# Patient Record
Sex: FEMALE | Race: WHITE | Employment: OTHER | URBAN - METROPOLITAN AREA
[De-identification: names, ages, dates, MRNs, and addresses within clinical notes are randomized per-mention and may not be internally consistent; named-entity substitution may affect disease eponyms.]

---

## 2017-03-17 RX ORDER — HYDROCHLOROTHIAZIDE 12.5 MG/1
CAPSULE ORAL
Qty: 90 CAP | Refills: 0 | Status: SHIPPED | OUTPATIENT
Start: 2017-03-17 | End: 2017-05-15 | Stop reason: SDUPTHER

## 2017-03-20 RX ORDER — ESTRADIOL 0.1 MG/D
PATCH TRANSDERMAL
Qty: 12 PATCH | Refills: 3 | Status: SHIPPED | OUTPATIENT
Start: 2017-03-20 | End: 2017-12-13 | Stop reason: SDUPTHER

## 2017-03-20 RX ORDER — LISINOPRIL 10 MG/1
TABLET ORAL
Qty: 90 TAB | Refills: 3 | Status: SHIPPED | OUTPATIENT
Start: 2017-03-20 | End: 2017-05-15 | Stop reason: SDUPTHER

## 2017-03-27 ENCOUNTER — OFFICE VISIT (OUTPATIENT)
Dept: INTERNAL MEDICINE CLINIC | Age: 77
End: 2017-03-27

## 2017-03-27 VITALS
HEIGHT: 62 IN | RESPIRATION RATE: 15 BRPM | HEART RATE: 54 BPM | TEMPERATURE: 98.2 F | BODY MASS INDEX: 22.23 KG/M2 | OXYGEN SATURATION: 98 % | DIASTOLIC BLOOD PRESSURE: 72 MMHG | SYSTOLIC BLOOD PRESSURE: 148 MMHG | WEIGHT: 120.8 LBS

## 2017-03-27 DIAGNOSIS — I10 ESSENTIAL HYPERTENSION: ICD-10-CM

## 2017-03-27 DIAGNOSIS — Z01.818 PREOP EXAM FOR INTERNAL MEDICINE: Primary | ICD-10-CM

## 2017-03-27 DIAGNOSIS — H25.11 NUCLEAR SCLEROTIC CATARACT OF RIGHT EYE: ICD-10-CM

## 2017-03-27 NOTE — MR AVS SNAPSHOT
Visit Information Date & Time Provider Department Dept. Phone Encounter #  
 3/27/2017  1:00 PM Ariane Robles MD Internist of [facility] [address] [id number] Your Appointments 5/8/2017  9:35 AM  
LAB with [facility] NURSE VISIT Internist of [facility] ([address]) Appt Note: labs 6mos. rm  
 [address], [address] [place] [address] [address]  
  
   
 [address], [facility] 5/15/2017 10:00 AM  
Office Visit with Ariane Robles MD  
Internist of [address] [facility]) Appt Note: ov 6mos. rm  
 [address], [address] [place] [address] [address]  
  
   
 [address], [facility] Upcoming Health Maintenance Date Due DTaP/Tdap/Td series (1 - Tdap) 7/15/1962 MEDICARE YEARLY EXAM 3/24/2017 Pneumococcal 65+ Low/Medium Risk (2 of 2 - PPSV23) 5/23/2017 GLAUCOMA SCREENING Q2Y 7/13/2017 COLONOSCOPY 11/13/2025 Allergies as of 3/27/2017  Review Complete On: 3/27/2017 By: Ariane Robles MD  
 No Known Allergies Current Immunizations  Reviewed on 11/14/2016 Name Date H1N1 FLU VACCINE 12/17/2009 Influenza High Dose Vaccine PF 11/14/2016, 11/11/2015 Influenza Vaccine 10/20/2014  2:55 PM, 10/4/2013 12:06 PM  
 Influenza Vaccine Split 10/23/2012 11:50 AM, 10/5/2011 Influenza Vaccine Whole 10/15/2010 Pneumococcal Vaccine (Unspecified Type) 5/23/2012 Not reviewed this visit You Were Diagnosed With   
  
 Codes Comments Preop exam for internal medicine    -  Primary ICD-10-CM: S05.178 ICD-9-CM: V72.83 Nuclear sclerotic cataract of right eye     ICD-10-CM: H25.11 ICD-9-CM: 366.16 Essential hypertension     ICD-10-CM: I10 
ICD-9-CM: 401.9 Vitals BP Pulse Temp Resp Height(growth percentile) Weight(growth percentile)  148/72 (BP 1 Location: Right arm, BP Patient Position: Sitting) (!) 54 98.2 °F (36.8 °C) (Oral) 15 5' 1.5\" (1.562 m) 120 lb 12.8 oz (54.8 kg) SpO2 BMI OB Status Smoking Status 98% 22.46 kg/m2 Hysterectomy Never Smoker Vitals History BMI and BSA Data Body Mass Index Body Surface Area  
 22.46 kg/m 2 1.54 m 2 Preferred Pharmacy Pharmacy Name Phone 305 [facility], [address] [place] [address] [address] Your Updated Medication List  
  
   
This list is accurate as of: 3/27/17  1:45 PM.  Always use your most recent med list.  
  
  
  
  
 CALCIUM WITH VITAMIN D PO Take  by mouth. DORZOLAMIDE-TIMOLOL OP Apply  to eye.  
  
 estradiol 0.1 mg/24 hr  
Commonly known as:  CLIMARA Apply 1 patch every 7 days  
  
 hydroCHLOROthiazide 12.5 mg capsule Commonly known as:  Linden Tiff Take 1 capsule by mouth  daily  
  
 lisinopril 10 mg tablet Commonly known as:  Zamudio Joshua Take 1 tablet by mouth  daily LUMIGAN 0.03 % ophthalmic drops Generic drug:  bimatoprost  
Administer 1 Drop to both eyes every evening. MULTIVITAMIN PO Take  by mouth. Introducing [facility] & HEALTH SERVICES! Steve Rossi introduces LoraxAg patient portal. Now you can access parts of your medical record, email your doctor's office, and request medication refills online. 1. In your internet browser, go to [email or web address] [email or web address]/[email or web address] Systems 2. Click on the First Time User? Click Here link in the Sign In box. You will see the New Member Sign Up page. 3. Enter your LoraxAg Access Code exactly as it appears below. You will not need to use this code after youve completed the sign-up process. If you do not sign up before the expiration date, you must request a new code. · LoraxAg Access Code: [email or web address] Expires: 6/25/2017  1:07 PM 
 
4. Enter the last four digits of your Social Security Number (xxxx) and Date of Birth (mm/dd/yyyy) as indicated and click Submit.  You will be taken to the next sign-up page. 5. Create a Casero ID. This will be your Casero login ID and cannot be changed, so think of one that is secure and easy to remember. 6. Create a Casero password. You can change your password at any time. 7. Enter your Password Reset Question and Answer. This can be used at a later time if you forget your password. 8. Enter your e-mail address. You will receive e-mail notification when new information is available in [address]. 9. Click Sign Up. You can now view and download portions of your medical record. 10. Click the Download Summary menu link to download a portable copy of your medical information. If you have questions, please visit the Frequently Asked Questions section of the Casero website. Remember, Casero is NOT to be used for urgent needs. For medical emergencies, dial 911. Now available from your iPhone and Android! Please provide this summary of care documentation to your next provider. Your primary care clinician is listed as Rosemary Britton. If you have any questions after today's visit, please call [phone].

## 2017-03-27 NOTE — PROGRESS NOTES
Hilary Ernst 1940, is a 77 y.o. female, who is seen today for preoperative evaluation prior to having cataract surgery. The patient is felt generally well but has been a little congested in her nose with some pressure in the frontal and maxillary areas from time to time and it is worse today than it has been the last few days. She has not tried any medicine. She says this type of thing has gotten worse each spring the last few years. She takes all of her medicine correctly and otherwise feels well. Past Medical History:   Diagnosis Date    Diabetes ([facility] [address]     Diverticulosis of sigmoid colon 11/13/15    mild; Dr. Noah Romero Eczema     Elevated cholesterol     Family history of diabetes mellitus (DM)     Glaucoma     Hyperlipidemia     Hypertension     Indigestion     Interpolated PVCs      Past Surgical History:   Procedure Laterality Date    HX BREAST BIOPSY      HX HYSTERECTOMY  1981    STEPHAN/BSO    HX OOPHORECTOMY      right     Current Outpatient Prescriptions   Medication Sig Dispense Refill    estradiol (CLIMARA) 0.1 mg/24 hr Apply 1 patch every 7 days 12 Patch 3    lisinopril (PRINIVIL, ZESTRIL) 10 mg tablet Take 1 tablet by mouth  daily 90 Tab 3    hydroCHLOROthiazide (MICROZIDE) 12.5 mg capsule Take 1 capsule by mouth  daily 90 Cap 0    bimatoprost (LUMIGAN) 0.03 % ophthalmic drops Administer 1 Drop to both eyes every evening.  DORZOLAMIDE HCL/TIMOLOL MALEAT (DORZOLAMIDE-TIMOLOL OP) Apply  to eye.  MULTIVITAMIN PO Take  by mouth.  CALCIUM CARBONATE/VITAMIN D3 (CALCIUM WITH VITAMIN D PO) Take  by mouth.        No Known Allergies     Social History     Social History    Marital status:      Spouse name: N/A    Number of children: N/A    Years of education: N/A     Social History Main Topics    Smoking status: Never Smoker    Smokeless tobacco: Never Used    Alcohol use No      Comment: with dinner    Drug use: No    Sexual activity: Not Currently Other Topics Concern    None     Social History Narrative     Review of systems: She denies dyspnea on exertion PND orthopnea or edema. She denies any type of exertional discomfort in the chest neck jaw back or arm. Visit Vitals    /72 (BP 1 Location: Right arm, BP Patient Position: Sitting)    Pulse (!) 54    Temp 98.2 °F (36.8 °C) (Oral)    Resp 15    Ht 5' 1.5\" (1.562 m)    Wt 120 lb 12.8 oz (54.8 kg)    SpO2 98%    BMI 22.46 kg/m2     Nasal passages reveal minimal tenacious clear secretions. Pharynx reveals no redness exudate or drainage. Neck reveals no adenopathy or thyromegaly. Carotids are 2+ without bruits. Lungs are clear to percussion. Good breath sounds with no wheezing or crackles. Heart reveals a regular rhythm with normal S1 and S2 with no murmur gallop click or rub. Apical impulse is not palpable. Abdomen is soft and nontender with no hepatosplenomegaly or masses and no bruits. Extremities reveal no clubbing cyanosis or edema. Pulses are 2+ throughout. Assessment: #1. Right cataract, low risk for upcoming surgery. She is medically cleared for cataract surgery and associated anesthetic. #2.  Apparent allergic rhinitis with some nasal discomfort. She will try Sudafed and use Tylenol as needed. #3. Hypertension fairly well controlled. She will continue hydrochlorothiazide 12.5 mg daily and lisinopril 10 mg daily. Follow-up as previously planned about 2 months from now. He Iraheta MD FACP    Please note: This document has been produced using voice recognition software. Unrecognized errors in transcription may be present.

## 2017-05-08 ENCOUNTER — HOSPITAL ENCOUNTER (OUTPATIENT)
Dept: LAB | Age: 77
Discharge: HOME | End: 2017-05-08
Payer: MEDICARE

## 2017-05-08 DIAGNOSIS — R73.01 IMPAIRED FASTING GLUCOSE: ICD-10-CM

## 2017-05-08 DIAGNOSIS — R73.03 PREDIABETES: ICD-10-CM

## 2017-05-08 DIAGNOSIS — E78.5 HYPERLIPIDEMIA LDL GOAL <130: ICD-10-CM

## 2017-05-08 LAB
ALBUMIN SERPL BCP-MCNC: 3.7 G/DL (ref 3.4–5)
ALBUMIN/GLOB SERPL: 1.2 {RATIO} (ref 0.8–1.7)
ALP SERPL-CCNC: 49 U/L (ref 45–117)
ALT SERPL-CCNC: 18 U/L (ref 13–56)
ANION GAP BLD CALC-SCNC: 8 MMOL/L (ref 3–18)
AST SERPL W P-5'-P-CCNC: 18 U/L (ref 15–37)
BILIRUB SERPL-MCNC: 0.4 MG/DL (ref 0.2–1)
BUN SERPL-MCNC: 10 MG/DL (ref 7–18)
BUN/CREAT SERPL: 15 (ref 12–20)
CALCIUM SERPL-MCNC: 8.6 MG/DL (ref 8.5–10.1)
CHLORIDE SERPL-SCNC: 101 MMOL/L (ref 100–108)
CHOLEST SERPL-MCNC: 246 MG/DL
CO2 SERPL-SCNC: 30 MMOL/L (ref 21–32)
CREAT SERPL-MCNC: 0.66 MG/DL (ref 0.6–1.3)
EST. AVERAGE GLUCOSE BLD GHB EST-MCNC: 137 MG/DL
GLOBULIN SER CALC-MCNC: 3 G/DL (ref 2–4)
GLUCOSE SERPL-MCNC: 107 MG/DL (ref 74–99)
HBA1C MFR BLD: 6.4 % (ref 4.2–5.6)
HDLC SERPL-MCNC: 61 MG/DL (ref 40–60)
HDLC SERPL: 4 {RATIO} (ref 0–5)
LDLC SERPL CALC-MCNC: 163.8 MG/DL (ref 0–100)
LIPID PROFILE,FLP: ABNORMAL
POTASSIUM SERPL-SCNC: 4.1 MMOL/L (ref 3.5–5.5)
PROT SERPL-MCNC: 6.7 G/DL (ref 6.4–8.2)
SODIUM SERPL-SCNC: 139 MMOL/L (ref 136–145)
TRIGL SERPL-MCNC: 106 MG/DL (ref ?–150)
VLDLC SERPL CALC-MCNC: 21.2 MG/DL

## 2017-05-08 PROCEDURE — 36415 COLL VENOUS BLD VENIPUNCTURE: CPT | Performed by: INTERNAL MEDICINE

## 2017-05-08 PROCEDURE — 80061 LIPID PANEL: CPT | Performed by: INTERNAL MEDICINE

## 2017-05-08 PROCEDURE — 80053 COMPREHEN METABOLIC PANEL: CPT | Performed by: INTERNAL MEDICINE

## 2017-05-08 PROCEDURE — 83036 HEMOGLOBIN GLYCOSYLATED A1C: CPT | Performed by: INTERNAL MEDICINE

## 2017-05-15 ENCOUNTER — OFFICE VISIT (OUTPATIENT)
Dept: INTERNAL MEDICINE CLINIC | Age: 77
End: 2017-05-15

## 2017-05-15 VITALS
SYSTOLIC BLOOD PRESSURE: 136 MMHG | HEART RATE: 53 BPM | RESPIRATION RATE: 12 BRPM | TEMPERATURE: 98.5 F | WEIGHT: 118 LBS | OXYGEN SATURATION: 98 % | BODY MASS INDEX: 21.71 KG/M2 | HEIGHT: 62 IN | DIASTOLIC BLOOD PRESSURE: 76 MMHG

## 2017-05-15 DIAGNOSIS — I10 ESSENTIAL HYPERTENSION: Primary | ICD-10-CM

## 2017-05-15 DIAGNOSIS — R73.03 PREDIABETES: ICD-10-CM

## 2017-05-15 DIAGNOSIS — R73.01 IMPAIRED FASTING GLUCOSE: ICD-10-CM

## 2017-05-15 DIAGNOSIS — E78.5 HYPERLIPIDEMIA LDL GOAL <130: ICD-10-CM

## 2017-05-15 DIAGNOSIS — I73.00 RAYNAUD'S DISEASE WITHOUT GANGRENE: ICD-10-CM

## 2017-05-15 RX ORDER — LISINOPRIL 10 MG/1
TABLET ORAL
Qty: 90 TAB | Refills: 3 | Status: SHIPPED | OUTPATIENT
Start: 2017-05-15 | End: 2017-11-14 | Stop reason: DRUGHIGH

## 2017-05-15 RX ORDER — HYDROCHLOROTHIAZIDE 12.5 MG/1
CAPSULE ORAL
Qty: 90 CAP | Refills: 3 | Status: SHIPPED | OUTPATIENT
Start: 2017-05-15 | End: 2017-11-14 | Stop reason: DRUGHIGH

## 2017-05-15 NOTE — PROGRESS NOTES
Health Maintenance Due   Topic Date Due    DTaP/Tdap/Td series (1 - Tdap) 07/15/1962    MEDICARE YEARLY EXAM  03/24/2017    GLAUCOMA SCREENING Q2Y  07/13/2017     1. Have you been to the ER, urgent care clinic since your last visit? Hospitalized since your last visit? No    2. Have you seen or consulted any other health care providers outside of the [address] [place] [address] since your last visit? Include any pap smears or colon screening.  No

## 2017-05-15 NOTE — MR AVS SNAPSHOT
Visit Information Date & Time Provider Department Dept. Phone Encounter #  
 5/15/2017 10:00 AM Michael Brennan MD Internist of [facility] [place] [address] [id number] Your Appointments 11/14/2017 10:30 AM  
PHYSICAL with Michael Brennan MD  
Internist of [facility]) Appt Note: rpe  
 [address] [facility], [address] [address] [address]  
  
   
 [address], [address] Upcoming Health Maintenance Date Due DTaP/Tdap/Td series (1 - Tdap) 7/15/1961 MEDICARE YEARLY EXAM 3/24/2017 GLAUCOMA SCREENING Q2Y 7/13/2017 Pneumococcal 65+ Low/Medium Risk (2 of 2 - PPSV23) 5/23/2017 INFLUENZA AGE 9 TO ADULT 8/1/2017 COLONOSCOPY 11/13/2025 Allergies as of 5/15/2017  Review Complete On: 5/15/2017 By: Michael Brennan MD  
 No Known Allergies Current Immunizations  Reviewed on 11/14/2016 Name Date H1N1 FLU VACCINE 12/17/2009 Influenza High Dose Vaccine PF 11/14/2016, 11/11/2015 Influenza Vaccine 10/20/2014  2:55 PM, 10/4/2013 12:06 PM  
 Influenza Vaccine Split 10/23/2012 11:50 AM, 10/5/2011 Influenza Vaccine Whole 10/15/2010 Pneumococcal Vaccine (Unspecified Type) 5/23/2012 Not reviewed this visit You Were Diagnosed With   
  
 Codes Comments Essential hypertension    -  Primary ICD-10-CM: I10 
ICD-9-CM: 401.9 Hyperlipidemia LDL goal <130     ICD-10-CM: E78.5 ICD-9-CM: 272.4 Prediabetes     ICD-10-CM: R73.03 
ICD-9-CM: 790.29 Vitals BP Pulse Temp Resp Height(growth percentile) Weight(growth percentile) 136/76 (BP 1 Location: Left arm, BP Patient Position: Sitting) (!) 53 98.5 °F (36.9 °C) (Oral) 12 5' 1.5\" (1.562 m) 118 lb (53.5 kg) SpO2 BMI OB Status Smoking Status 98% 21.93 kg/m2 Hysterectomy Never Smoker Vitals History BMI and BSA Data  Body Mass Index Body Surface Area  
 21.93 kg/m 2 1.52 m 2  
  
  
 Preferred Pharmacy Pharmacy Name Phone 305 [facility], [address] [facility] [address] Sa[address] M[address] Your Updated Medication List  
  
   
This list is accurate as of: 5/15/17 10:30 AM.  Always use your most recent med list.  
  
  
  
  
 CALCIUM WITH VITAMIN D PO Take  by mouth. DORZOLAMIDE-TIMOLOL OP Apply  to eye.  
  
 estradiol 0.1 mg/24 hr  
Commonly known as:  CLIMARA Apply 1 patch every 7 days  
  
 hydroCHLOROthiazide 12.5 mg capsule Commonly known as:  Ada Nian Take 1 capsule by mouth  daily  
  
 lisinopril 10 mg tablet Commonly known as:  Marialuisa Shawl Take 1 tablet by mouth  daily LUMIGAN 0.03 % ophthalmic drops Generic drug:  bimatoprost  
Administer 1 Drop to both eyes every evening. MULTIVITAMIN PO Take  by mouth. Prescriptions Sent to Pharmacy Refills  
 hydroCHLOROthiazide (MICROZIDE) 12.5 mg capsule 3 Sig: Take 1 capsule by mouth  daily Class: Normal  
 Pharmacy: [facility] [address] Ph #: [phone]  
 lisinopril (PRINIVIL, ZESTRIL) 10 mg tablet 3 Sig: Take 1 tablet by mouth  daily Class: Normal  
 Pharmacy: [facility]  [address] Ph #: [phone] Patient Instructions Health Maintenance Due Topic Date Due  
 DTaP/Tdap/Td series (1 - Tdap) 07/15/1962  MEDICARE YEARLY EXAM  03/24/2017  GLAUCOMA SCREENING Q2Y  07/13/2017 Introducing [facility] & HEALTH SERVICES! Bobby Pickard introduces citiservi patient portal. Now you can access parts of your medical record, email your doctor's office, and request medication refills online. 1. In your internet browser, go to [email or web address] 2. Click on the First Time User? Click Here link in the Sign In box. You will see the New Member Sign Up page. 3. Enter your citiservi Access Code exactly as it appears below.  You will not need to use this code after youve completed the sign-up process. If you do not sign up before the expiration date, you must request a new code. · Datahero Access Code: [email or web address] Expires: 6/25/2017  1:07 PM 
 
4. Enter the last four digits of your Social Security Number (xxxx) and Date of Birth (mm/dd/yyyy) as indicated and click Submit. You will be taken to the next sign-up page. 5. Create a Datahero ID. This will be your Datahero login ID and cannot be changed, so think of one that is secure and easy to remember. 6. Create a Datahero password. You can change your password at any time. 7. Enter your Password Reset Question and Answer. This can be used at a later time if you forget your password. 8. Enter your e-mail address. You will receive e-mail notification when new information is available in [address] 9. Click Sign Up. You can now view and download portions of your medical record. 10. Click the Download Summary menu link to download a portable copy of your medical information. If you have questions, please visit the Frequently Asked Questions section of the Datahero website. Remember, Datahero is NOT to be used for urgent needs. For medical emergencies, dial 911. Now available from your iPhone and Android! Please provide this summary of care documentation to your next provider. Your primary care clinician is listed as Bharat Hwang. If you have any questions after today's visit, please call [phone].

## 2017-05-15 NOTE — PATIENT INSTRUCTIONS
Health Maintenance Due   Topic Date Due    DTaP/Tdap/Td series (1 - Tdap) 07/15/1962    MEDICARE YEARLY EXAM  03/24/2017    GLAUCOMA SCREENING Q2Y  07/13/2017

## 2017-05-15 NOTE — PROGRESS NOTES
Deuce Moreira 1940, is a 77 y.o. female, who is seen today for reevaluation of hypertension hyperlipidemia impaired fasting glucose and periodic sciatica. She still has lumbar pain particularly the right and somewhat on the left but only occasionally radiating into her right leg. She uses Tylenol has not used any hydrocodone for quite a while. She takes her blood pressure medicine regularly and is eating a very healthy diet with no sweets and less starch than she used to eat. She also notes that sometimes in the cold weather this last winter her fingertips would get weight and tingly and hurt but by warming them symptoms would clear. Past Medical History:   Diagnosis Date    [facility]     Diverticulosis of sigmoid colon 11/13/15    mild; Dr. Mckay Human Eczema     Elevated cholesterol     Family history of diabetes mellitus (DM)     Glaucoma     Hyperlipidemia     Hypertension     Indigestion     Interpolated PVCs      Current Outpatient Prescriptions   Medication Sig Dispense Refill    hydroCHLOROthiazide (MICROZIDE) 12.5 mg capsule Take 1 capsule by mouth  daily 90 Cap 3    lisinopril (PRINIVIL, ZESTRIL) 10 mg tablet Take 1 tablet by mouth  daily 90 Tab 3    estradiol (CLIMARA) 0.1 mg/24 hr Apply 1 patch every 7 days 12 Patch 3    bimatoprost (LUMIGAN) 0.03 % ophthalmic drops Administer 1 Drop to both eyes every evening.  DORZOLAMIDE HCL/TIMOLOL MALEAT (DORZOLAMIDE-TIMOLOL OP) Apply  to eye.  MULTIVITAMIN PO Take  by mouth.  CALCIUM CARBONATE/VITAMIN D3 (CALCIUM WITH VITAMIN D PO) Take  by mouth. Visit Vitals    /76 (BP 1 Location: Left arm, BP Patient Position: Sitting)    Pulse (!) 53    Temp 98.5 °F (36.9 °C) (Oral)    Resp 12    Ht 5' 1.5\" (1.562 m)    Wt 118 lb (53.5 kg)    SpO2 98%    BMI 21.93 kg/m2     Carotids are 2+ without bruits. Lungs are clear to percussion. Good breath sounds with no wheezing or crackles.   Heart reveals a regular rhythm with normal S1 and S2 no murmur gallop click or rub. Apical impulse is nonpalpable. Abdomen is soft and nontender with no hepatosplenomegaly or masses and no bruits. Extremities reveal no clubbing cyanosis or edema. Pulses are 2+ throughout. Assessment: #1. Hypertension is controlled. She will continue lisinopril 10 mg daily and hydrochlorothiazide 12.5 mg daily. #2. Hyperlipidemia intolerant to statin. She will continue low-fat diet and avoid weight gain. #3.  Impaired fasting glucose doing well. She will continue current very healthy diet with avoidance of sweets and excess starches. #4.  Periodic sciatica doing quite well. She will call if symptoms worsen dramatically. #5.  Symptoms of Raynaud's. Follow-up in 6 months for complete evaluation    He Thomas MD FACP    Please note: This document has been produced using voice recognition software. Unrecognized errors in transcription may be present.

## 2017-07-20 ENCOUNTER — OFFICE VISIT (OUTPATIENT)
Dept: INTERNAL MEDICINE CLINIC | Age: 77
End: 2017-07-20

## 2017-07-20 VITALS
HEIGHT: 62 IN | DIASTOLIC BLOOD PRESSURE: 80 MMHG | OXYGEN SATURATION: 99 % | HEART RATE: 56 BPM | RESPIRATION RATE: 12 BRPM | TEMPERATURE: 97.9 F | SYSTOLIC BLOOD PRESSURE: 154 MMHG | WEIGHT: 120 LBS | BODY MASS INDEX: 22.08 KG/M2

## 2017-07-20 DIAGNOSIS — R10.9 FLANK PAIN: Primary | ICD-10-CM

## 2017-07-20 DIAGNOSIS — M54.32 SCIATICA OF LEFT SIDE: ICD-10-CM

## 2017-07-20 RX ORDER — DEXAMETHASONE 4 MG/1
TABLET ORAL
Qty: 20 TAB | Refills: 0 | Status: SHIPPED | OUTPATIENT
Start: 2017-07-20 | End: 2017-07-27 | Stop reason: SDUPTHER

## 2017-07-20 RX ORDER — HYDROCODONE BITARTRATE AND ACETAMINOPHEN 7.5; 325 MG/1; MG/1
1 TABLET ORAL
Qty: 60 TAB | Refills: 0 | Status: SHIPPED | OUTPATIENT
Start: 2017-07-20 | End: 2017-11-14 | Stop reason: ALTCHOICE

## 2017-07-20 NOTE — MR AVS SNAPSHOT
Visit Information Date & Time Provider Department Dept. Phone Encounter #  
 7/20/2017 11:00 AM Kina Marcum MD Internist of [facility] [place] [address] [id number] Your Appointments 11/10/2017  8:45 AM  
LAB with [facility] NURSE VISIT Internist of [facility] Appt Note: lab  
 [address], [address] [facility] [address] [place]  
  
   
 [address], [address]  
  
    
 11/14/2017 10:30 AM  
PHYSICAL with Kina Marcum MD  
Internist of [facility]) Appt Note: rpe  
 [address] [facility], [address] [address] [address]  
  
   
 [address], [address] Upcoming Health Maintenance Date Due DTaP/Tdap/Td series (1 - Tdap) 7/15/1961 MEDICARE YEARLY EXAM 3/24/2017 Pneumococcal 65+ Low/Medium Risk (2 of 2 - PPSV23) 5/23/2017 GLAUCOMA SCREENING Q2Y 7/13/2017 INFLUENZA AGE 9 TO ADULT 8/1/2017 COLONOSCOPY 11/13/2025 Allergies as of 7/20/2017  Review Complete On: 7/20/2017 By: Kina Marcum MD  
 No Known Allergies Current Immunizations  Reviewed on 11/14/2016 Name Date H1N1 FLU VACCINE 12/17/2009 Influenza High Dose Vaccine PF 11/14/2016, 11/11/2015 Influenza Vaccine 10/20/2014  2:55 PM, 10/4/2013 12:06 PM  
 Influenza Vaccine Split 10/23/2012 11:50 AM, 10/5/2011 Influenza Vaccine Whole 10/15/2010 Pneumococcal Vaccine (Unspecified Type) 5/23/2012 Not reviewed this visit You Were Diagnosed With   
  
 Codes Comments Flank pain    -  Primary ICD-10-CM: R10.9 ICD-9-CM: 789.09 Sciatica of left side     ICD-10-CM: M54.32 
ICD-9-CM: 724.3 Vitals BP Pulse Temp Resp Height(growth percentile) Weight(growth percentile) 160/70 (!) 56 97.9 °F (36.6 °C) (Oral) 12 5' 1.5\" (1.562 m) 120 lb (54.4 kg) SpO2 BMI OB Status Smoking Status 99% 22.31 kg/m2 Hysterectomy Never Smoker Vitals History BMI and BSA Data Body Mass Index Body Surface Area  
 22.31 kg/m 2 1.54 m 2 Preferred Pharmacy Pharmacy Name Phone 305 [facility], [address] Sa[address] Your Updated Medication List  
  
   
This list is accurate as of: 17 11:19 AM.  Always use your most recent med list.  
  
  
  
  
 CALCIUM WITH VITAMIN D PO Take  by mouth. dexamethasone 4 mg tablet Commonly known as:  DECADRON  
1 tablet by mouth twice a day DORZOLAMIDE-TIMOLOL OP Apply  to eye.  
  
 estradiol 0.1 mg/24 hr  
Commonly known as:  CLIMARA Apply 1 patch every 7 days  
  
 hydroCHLOROthiazide 12.5 mg capsule Commonly known as:  Jennifer Elly Take 1 capsule by mouth  daily HYDROcodone-acetaminophen 7.5-325 mg per tablet Commonly known as:  [place]is Points Take 1 Tab by mouth every six (6) hours as needed for Pain. Max Daily Amount: 4 Tabs. lisinopril 10 mg tablet Commonly known as:  Dorathy Massed Take 1 tablet by mouth  daily LUMIGAN 0.03 % ophthalmic drops Generic drug:  bimatoprost  
Administer 1 Drop to both eyes every evening. MULTIVITAMIN PO Take  by mouth. Prescriptions Printed Refills  
 dexamethasone (DECADRON) 4 mg tablet 0 Si tablet by mouth twice a day Class: Print HYDROcodone-acetaminophen (NORCO) 7.5-325 mg per tablet 0 Sig: Take 1 Tab by mouth every six (6) hours as needed for Pain. Max Daily Amount: 4 Tabs. Class: Print Route: Oral  
  
We Performed the Following AMB POC URINALYSIS DIP STICK AUTO W/O MICRO [83330 CPT(R)] Introducing [facility] & [facility] SERVICES! Ember Tucker introduces Tapstream patient portal. Now you can access parts of your medical record, email your doctor's office, and request medication refills online. 1. In your internet browser, go to [email or web address] 2. Click on the First Time User? Click Here link in the Sign In box. You will see the New Member Sign Up page. 3. Enter your UtiliData Access Code exactly as it appears below. You will not need to use this code after youve completed the sign-up process. If you do not sign up before the expiration date, you must request a new code. · UtiliData Access Code: [email or web address] Expires: 10/18/2017 11:19 AM 
 
4. Enter the last four digits of your Social Security Number (xxxx) and Date of Birth (mm/dd/yyyy) as indicated and click Submit. You will be taken to the next sign-up page. 5. Create a UtiliData ID. This will be your UtiliData login ID and cannot be changed, so think of one that is secure and easy to remember. 6. Create a UtiliData password. You can change your password at any time. 7. Enter your Password Reset Question and Answer. This can be used at a later time if you forget your password. 8. Enter your e-mail address. You will receive e-mail notification when new information is available in [address] 9. Click Sign Up. You can now view and download portions of your medical record. 10. Click the Download Summary menu link to download a portable copy of your medical information. If you have questions, please visit the Frequently Asked Questions section of the UtiliData website. Remember, UtiliData is NOT to be used for urgent needs. For medical emergencies, dial 911. Now available from your iPhone and Android! Please provide this summary of care documentation to your next provider. Your primary care clinician is listed as Juma Rivera. If you have any questions after today's visit, please call [phone].

## 2017-07-20 NOTE — PROGRESS NOTES
Laila Eddy 1940, is a 69 y.o. female, who is seen today for back pain and urinary symptoms. She was out working in her yard last week pulling weeds and so on and did develop some slight discomfort in the lumbar area but then over the next few days it was better and now the last 2 days without any additional significant activity she has developed the back pain again in the mid lower lumbar area but radiating into both thighs down to the knees especially on the left but faintly noticeable on the right. No weakness in the legs. She is walking well but it hurts particularly when she first gets up from sitting or laying down and also rolling over in bed it hurts her in the back. She has had sciatica in the past and has used dexamethasone successfully. She is planning to go to [facility] on August 6 and would really like to be a lot more comfortable by then. She was in [facility] one time in the past when this first developed and she was in rather severe pain and got care of that was probably not optimal, no steroids. Today she also has noticed some very slight dysuria but no definite increase in urinary frequency. No chills sweats fever and no mid back pain. No abdominal pain. No blood in her urine though she had that in the past when she had a UTI. Past Medical History:   Diagnosis Date    Diabetes ([facility] [address]     Diverticulosis of sigmoid colon 11/13/15    mild; Dr. Simons [place] Eczema     Elevated cholesterol     Family history of diabetes mellitus (DM)     Glaucoma     Hyperlipidemia     Hypertension     Indigestion     Interpolated PVCs      Current Outpatient Prescriptions   Medication Sig Dispense Refill    dexamethasone (DECADRON) 4 mg tablet 1 tablet by mouth twice a day 20 Tab 0    HYDROcodone-acetaminophen (NORCO) 7.5-325 mg per tablet Take 1 Tab by mouth every six (6) hours as needed for Pain. Max Daily Amount: 4 Tabs.  60 Tab 0    hydroCHLOROthiazide (MICROZIDE) 12.5 mg capsule Take 1 capsule by mouth  daily 90 Cap 3    lisinopril (PRINIVIL, ZESTRIL) 10 mg tablet Take 1 tablet by mouth  daily 90 Tab 3    estradiol (CLIMARA) 0.1 mg/24 hr Apply 1 patch every 7 days 12 Patch 3    bimatoprost (LUMIGAN) 0.03 % ophthalmic drops Administer 1 Drop to both eyes every evening.  DORZOLAMIDE HCL/TIMOLOL MALEAT (DORZOLAMIDE-TIMOLOL OP) Apply  to eye.  CALCIUM CARBONATE/VITAMIN D3 (CALCIUM WITH VITAMIN D PO) Take  by mouth.  MULTIVITAMIN PO Take  by mouth. Visit Vitals    /80    Pulse (!) 56    Temp 97.9 °F (36.6 °C) (Oral)    Resp 12    Ht 5' 1.5\" (1.562 m)    Wt 120 lb (54.4 kg)    SpO2 99%    BMI 22.31 kg/m2     There is no CVA tenderness. No other back tenderness. Straight leg raising is normal bilaterally. Quite good range of motion of the hips but she does have discomfort in the lumbar area with internal rotation of the hips and very slightly with straight leg raising on the right. Gait is normal.  Muscular strength is 5 out of 5 in both legs, she can get up onto her toes without difficulty. There is no tenderness in the legs no edema and pulses are 2+. There is no suprapubic tenderness. She try to give us a urine specimen but dropped the cup into the toilet stating she is nervous. Assessment: #1.  Back pain and probably mild sciatica. She has a trip to [facility] coming up soon so I recommended she start dexamethasone 4 mg twice daily now and give her the best possible chance of being asymptomatic when she leaves for [facility]. I have also given her prescription for Norco since her previous prescription is outdated according to the computer giving us the last time I prescribed it. Even though is only slightly outdated she prefers to have fresh medication. #2.  Minimal urinary symptoms, she will take home a urine cup and if symptoms worsen she will bring in a specimen and she could be treated as soon as possible that way.   I have asked her to drink plenty of fluids and she may well not develop a true infection. #3.  Blood pressure is higher than usual but she is quite nervous. Blood pressure certainly not dangerously high we will just recheck that when she gets back from [facility]. She will continue lisinopril and hydrochlorothiazide at current doses. Follow-up as previously planned this fall    He Cadet MD FACP    Please note: This document has been produced using voice recognition software. Unrecognized errors in transcription may be present.

## 2017-07-27 RX ORDER — DEXAMETHASONE 4 MG/1
TABLET ORAL
Qty: 20 TAB | Refills: 0 | Status: SHIPPED | OUTPATIENT
Start: 2017-07-27 | End: 2017-11-14 | Stop reason: ALTCHOICE

## 2017-07-27 NOTE — TELEPHONE ENCOUNTER
Pt calling to see if she could get another script of the decadron. Pt states she is feeling better and leaving to go to [place]. pt  have 4 tabs left, but just want it just in case it comes back. Please call and notify patient.

## 2017-11-08 DIAGNOSIS — I10 ESSENTIAL HYPERTENSION: ICD-10-CM

## 2017-11-08 DIAGNOSIS — R73.01 IMPAIRED FASTING GLUCOSE: ICD-10-CM

## 2017-11-08 DIAGNOSIS — E78.5 HYPERLIPIDEMIA LDL GOAL <130: ICD-10-CM

## 2017-11-08 DIAGNOSIS — R73.03 PREDIABETES: ICD-10-CM

## 2017-11-10 ENCOUNTER — HOSPITAL ENCOUNTER (OUTPATIENT)
Dept: LAB | Age: 77
Discharge: HOME | End: 2017-11-10
Payer: MEDICARE

## 2017-11-10 LAB
ALBUMIN SERPL-MCNC: 3.7 G/DL (ref 3.4–5)
ALBUMIN/GLOB SERPL: 1.2 {RATIO} (ref 0.8–1.7)
ALP SERPL-CCNC: 48 U/L (ref 45–117)
ALT SERPL-CCNC: 20 U/L (ref 13–56)
ANION GAP SERPL CALC-SCNC: 9 MMOL/L (ref 3–18)
AST SERPL-CCNC: 15 U/L (ref 15–37)
BASOPHILS # BLD: 0.1 K/UL (ref 0–0.06)
BASOPHILS NFR BLD: 1 % (ref 0–2)
BILIRUB SERPL-MCNC: 0.2 MG/DL (ref 0.2–1)
BUN SERPL-MCNC: 11 MG/DL (ref 7–18)
BUN/CREAT SERPL: 18 (ref 12–20)
CALCIUM SERPL-MCNC: 8.5 MG/DL (ref 8.5–10.1)
CHLORIDE SERPL-SCNC: 100 MMOL/L (ref 100–108)
CHOLEST SERPL-MCNC: 221 MG/DL
CO2 SERPL-SCNC: 28 MMOL/L (ref 21–32)
CREAT SERPL-MCNC: 0.6 MG/DL (ref 0.6–1.3)
DIFFERENTIAL METHOD BLD: ABNORMAL
EOSINOPHIL # BLD: 0.2 K/UL (ref 0–0.4)
EOSINOPHIL NFR BLD: 4 % (ref 0–5)
ERYTHROCYTE [DISTWIDTH] IN BLOOD BY AUTOMATED COUNT: 13 % (ref 11.6–14.5)
EST. AVERAGE GLUCOSE BLD GHB EST-MCNC: 131 MG/DL
GLOBULIN SER CALC-MCNC: 3 G/DL (ref 2–4)
GLUCOSE SERPL-MCNC: 101 MG/DL (ref 74–99)
HBA1C MFR BLD: 6.2 % (ref 4.2–5.6)
HCT VFR BLD AUTO: 37.8 % (ref 35–45)
HDLC SERPL-MCNC: 66 MG/DL (ref 40–60)
HDLC SERPL: 3.3 {RATIO} (ref 0–5)
HGB BLD-MCNC: 12.3 G/DL (ref 12–16)
LDLC SERPL CALC-MCNC: 139.2 MG/DL (ref 0–100)
LIPID PROFILE,FLP: ABNORMAL
LYMPHOCYTES # BLD: 2 K/UL (ref 0.9–3.6)
LYMPHOCYTES NFR BLD: 36 % (ref 21–52)
MCH RBC QN AUTO: 30.8 PG (ref 24–34)
MCHC RBC AUTO-ENTMCNC: 32.5 G/DL (ref 31–37)
MCV RBC AUTO: 94.5 FL (ref 74–97)
MONOCYTES # BLD: 0.5 K/UL (ref 0.05–1.2)
MONOCYTES NFR BLD: 10 % (ref 3–10)
NEUTS SEG # BLD: 2.8 K/UL (ref 1.8–8)
NEUTS SEG NFR BLD: 49 % (ref 40–73)
PLATELET # BLD AUTO: 260 K/UL (ref 135–420)
PMV BLD AUTO: 11.4 FL (ref 9.2–11.8)
POTASSIUM SERPL-SCNC: 4.1 MMOL/L (ref 3.5–5.5)
PROT SERPL-MCNC: 6.7 G/DL (ref 6.4–8.2)
RBC # BLD AUTO: 4 M/UL (ref 4.2–5.3)
SODIUM SERPL-SCNC: 137 MMOL/L (ref 136–145)
T4 FREE SERPL-MCNC: 0.8 NG/DL (ref 0.7–1.5)
TRIGL SERPL-MCNC: 79 MG/DL (ref ?–150)
TSH SERPL DL<=0.05 MIU/L-ACNC: 3.52 UIU/ML (ref 0.36–3.74)
VLDLC SERPL CALC-MCNC: 15.8 MG/DL
WBC # BLD AUTO: 5.5 K/UL (ref 4.6–13.2)

## 2017-11-10 PROCEDURE — 83036 HEMOGLOBIN GLYCOSYLATED A1C: CPT | Performed by: INTERNAL MEDICINE

## 2017-11-10 PROCEDURE — 80053 COMPREHEN METABOLIC PANEL: CPT | Performed by: INTERNAL MEDICINE

## 2017-11-10 PROCEDURE — 36415 COLL VENOUS BLD VENIPUNCTURE: CPT | Performed by: INTERNAL MEDICINE

## 2017-11-10 PROCEDURE — 80061 LIPID PANEL: CPT | Performed by: INTERNAL MEDICINE

## 2017-11-10 PROCEDURE — 84439 ASSAY OF FREE THYROXINE: CPT | Performed by: INTERNAL MEDICINE

## 2017-11-10 PROCEDURE — 85025 COMPLETE CBC W/AUTO DIFF WBC: CPT | Performed by: INTERNAL MEDICINE

## 2017-11-10 PROCEDURE — 84443 ASSAY THYROID STIM HORMONE: CPT | Performed by: INTERNAL MEDICINE

## 2017-11-14 ENCOUNTER — OFFICE VISIT (OUTPATIENT)
Dept: INTERNAL MEDICINE CLINIC | Age: 77
End: 2017-11-14

## 2017-11-14 VITALS
TEMPERATURE: 97.4 F | RESPIRATION RATE: 14 BRPM | HEART RATE: 48 BPM | DIASTOLIC BLOOD PRESSURE: 64 MMHG | OXYGEN SATURATION: 98 % | WEIGHT: 119 LBS | SYSTOLIC BLOOD PRESSURE: 162 MMHG | BODY MASS INDEX: 21.9 KG/M2 | HEIGHT: 62 IN

## 2017-11-14 DIAGNOSIS — R73.01 IMPAIRED FASTING GLUCOSE: ICD-10-CM

## 2017-11-14 DIAGNOSIS — Z13.39 SCREENING FOR ALCOHOLISM: ICD-10-CM

## 2017-11-14 DIAGNOSIS — Z00.00 MEDICARE ANNUAL WELLNESS VISIT, SUBSEQUENT: ICD-10-CM

## 2017-11-14 DIAGNOSIS — Z13.31 SCREENING FOR DEPRESSION: ICD-10-CM

## 2017-11-14 DIAGNOSIS — M15.9 GENERALIZED OSTEOARTHROSIS, INVOLVING MULTIPLE SITES: ICD-10-CM

## 2017-11-14 DIAGNOSIS — E78.5 HYPERLIPIDEMIA LDL GOAL <130: ICD-10-CM

## 2017-11-14 DIAGNOSIS — I10 ESSENTIAL HYPERTENSION: Primary | ICD-10-CM

## 2017-11-14 DIAGNOSIS — Z23 ENCOUNTER FOR IMMUNIZATION: ICD-10-CM

## 2017-11-14 RX ORDER — LISINOPRIL AND HYDROCHLOROTHIAZIDE 20; 25 MG/1; MG/1
1 TABLET ORAL DAILY
Qty: 90 TAB | Refills: 3 | Status: SHIPPED | OUTPATIENT
Start: 2017-11-14 | End: 2018-06-09 | Stop reason: SDUPTHER

## 2017-11-14 NOTE — MR AVS SNAPSHOT
Visit Information Date & Time Provider Department Dept. Phone Encounter #  
 11/14/2017 10:30 AM Frantz Diaz MD [facility] [phone] [id number] Follow-up Instructions Return in about 3 months (around 2/14/2018). Upcoming Health Maintenance Date Due DTaP/Tdap/Td series (1 - Tdap) 7/15/1961 MEDICARE YEARLY EXAM 3/24/2017 Pneumococcal 65+ Low/Medium Risk (2 of 2 - PPSV23) 5/23/2017 GLAUCOMA SCREENING Q2Y 7/13/2017 Influenza Age 5 to Adult 8/1/2017 COLONOSCOPY 11/13/2025 Allergies as of 11/14/2017  Review Complete On: 11/14/2017 By: Frantz Diaz MD  
 No Known Allergies Current Immunizations  Reviewed on 11/14/2017 Name Date H1N1 FLU VACCINE 12/17/2009 Influenza High Dose Vaccine PF  Incomplete, 11/14/2016, 11/11/2015 Influenza Vaccine 10/20/2014  2:55 PM, 10/4/2013 12:06 PM  
 Influenza Vaccine Split 10/23/2012 11:50 AM, 10/5/2011 Influenza Vaccine Whole 10/15/2010 ZZZ-RETIRED (DO NOT USE) Pneumococcal Vaccine (Unspecified Type) 5/23/2012 Reviewed by Frantz Diaz MD on 11/14/2017 at 10:36 AM  
 Reviewed by Frantz Diaz MD on 11/14/2017 at 10:38 AM  
 Reviewed by Frantz Diaz MD on 11/14/2017 at 10:38 AM  
You Were Diagnosed With   
  
 Codes Comments Essential hypertension    -  Primary ICD-10-CM: I10 
ICD-9-CM: 401.9 Encounter for immunization     ICD-10-CM: B02 ICD-9-CM: V03.89 Generalized osteoarthrosis, involving multiple sites     ICD-10-CM: M15.9 ICD-9-CM: 715.09 Hyperlipidemia LDL goal <130     ICD-10-CM: E78.5 ICD-9-CM: 272.4 Impaired fasting glucose     ICD-10-CM: R73.01 
ICD-9-CM: 790.21 Medicare annual wellness visit, subsequent     ICD-10-CM: Z00.00 ICD-9-CM: V70.0 Screening for alcoholism     ICD-10-CM: Z13.89 ICD-9-CM: V79.1 Screening for depression     ICD-10-CM: Z13.89 ICD-9-CM: V79.0 Vitals BP Pulse Temp Resp Height(growth percentile) Weight(growth percentile) 162/64 (!) 48 97.4 °F (36.3 °C) (Oral) 14 5' 1.5\" (1.562 m) 119 lb (54 kg) SpO2 BMI OB Status Smoking Status 98% 22.12 kg/m2 Hysterectomy Never Smoker Vitals History BMI and BSA Data Body Mass Index Body Surface Area  
 22.12 kg/m 2 1.53 m 2 Preferred Pharmacy Pharmacy Name Phone [address] [facility] [address]  [address] Your Updated Medication List  
  
   
This list is accurate as of: 11/14/17 11:19 AM.  Always use your most recent med list.  
  
  
  
  
 CALCIUM WITH VITAMIN D PO Take  by mouth. DORZOLAMIDE-TIMOLOL OP Apply  to eye.  
  
 estradiol 0.1 mg/24 hr  
Commonly known as:  CLIMARA Apply 1 patch every 7 days  
  
 lisinopril-hydroCHLOROthiazide 20-25 mg per tablet Commonly known as:  Elizabeth Boxer Take 1 Tab by mouth daily. LUMIGAN 0.03 % ophthalmic drops Generic drug:  bimatoprost  
Administer 1 Drop to both eyes every evening. MULTIVITAMIN PO Take  by mouth. Prescriptions Sent to Pharmacy Refills  
 lisinopril-hydroCHLOROthiazide (PRINZIDE, ZESTORETIC) 20-25 mg per tablet 3 Sig: Take 1 Tab by mouth daily. Class: Normal  
 Pharmacy: [facility] Ph #: [phone] Route: Oral  
  
We Performed the Following [place] [id number] [[id number] hospitals] INFLUENZA VIRUS VACCINE, HIGH DOSE SEASONAL, PRESERVATIVE FREE [11199 CPT(R)] Follow-up Instructions Return in about 3 months (around 2/14/2018). Patient Instructions Medicare Part B Preventive Services Limitations Recommendation Follow-up Action Needed Bone Mass Measurement 
(age 72 & older, biennial) Requires diagnosis related to osteoporosis or estrogen deficiency.  Biennial benefit unless patient has history of long-term glucocorticoid tx or baseline is needed because initial test was by other method Last: 9/30/2004 A DEXA scan is recommended after you turn 72years of age to determine your risk for osteoporosis Next: Follow up as recommended by primary care provider and/or specialist    
Colorectal Cancer Screening 
-Fecal occult blood test (annual) -Flexible sigmoidoscopy (5y) 
-Screening colonoscopy (10y) -Barium Enema Normally recommended for patients age 48 - 78  Last: 12/15/2015 Every 5-10 years depending on your colonoscopy result starting at age 48 years Next: No longer needed based on age, follow up as recommended by primary care provider and/or specialist  
Glaucoma Screening (no USPSTF recommendation) Diabetes mellitus, family history, , age 48 or over,  American, age 72 or over Last: 11/2017 Every year, or as directed by provider Next: No longer needed based on age, follow up as recommended by primary care provider and/or specialist or at least every 2 years Screening Mammography (biennial age 54-69) Annually (age 36 or over) Last: 11/2016 Next: Follow up as recommended by primary care provider and/or specialist   
Screening Pap Tests and Pelvic Examination (up to age 72 and after 72 if unknown history or abnormal study last 10 years) Every 24 months except high risk Last: N/A Next: Discuss with your doctor if this screening is appropriate for you. Cardiovascular Screening Blood Tests (every 5 years) Total cholesterol, HDL, Triglycerides Order as a panel if possible Last: 5/2017 Every Year Next: Follow up as recommended by primary care provider and/or specialist   
Diabetes Screening Tests (at least every 3 years, Medicare covers annually or at 6-month intervals for prediabetic patients) Fasting blood sugar (FBS) or glucose tolerance test (GTT) Patient must be diagnosed with one of the following: 
-Hypertension, Dyslipidemia, obesity, previous impaired FBS or GTT Or any two of the following: overweight, FH of diabetes, age ? 72, history of gestational diabetes, birth of baby weighing more than 9 pounds Last: 5/2017 Every 3-6 months depending on your result Every 3 years Next: Follow up as recommended by primary care provider and/or specialist   
Diabetes Self-Management Training (DSMT) (no USPSTF recommendation) Requires referral by treating physician for patient with diabetes or renal disease. 10 hours of initial DSMT session of no less than 30 minutes each in a continuous 12-month period. 2 hours of follow-up DSMT in subsequent years. Last: N/A Talk to your doctor if you are interested in a refresher course. Medical Nutrition Therapy (MNT) (for diabetes or renal disease not recommended schedule) Requires referral by treating physician for patient with diabetes or renal disease. Can be provided in same year as diabetes self-management training (DSMT), and CMS recommends medical nutrition therapy take place after DSMT. Up to 3 hours for initial year and 2 hours in subsequent years. Last: N/A Talk to your doctor if you are interested in a refresher course. Seasonal Influenza Vaccination (annually)  Last: Done today Every Fall Please consider getting one every fall Pneumococcal Vaccination (once after 65)  Last: 
Pneumovax: 5/2012 Prevnar-13: Due now Next: 1 dose of the Prevnar-13 is recommended to finish the vaccine series Shingles Vaccination  Last: Done A shingles vaccine is recommended once in a lifetime after age 61 Vaccination series complete Tetanus, Diphtheria and Pertussis (Tdap) Vaccination Booster One Booster as an adult and then tetanus every 10 years or as indicated Last: N/A . N/A Hepatitis B Vaccinations (if medium/high risk) Medium/high risk factors:  End-stage renal disease, Hemophiliacs who received Factor VIII or IX concentrates, Clients of institutions for the mentally retarded, Persons who live in the same house as a HepB virus carrier, Homosexual men, Illicit injectable drug abusers. Last: N/A Next: N/A Counseling to Prevent Tobacco Use (up to 8 sessions per year) - Counseling greater than 3 and up to 10 minutes - Counseling greater than 10 minutes Patients must be asymptomatic of tobacco-related conditions to receive as preventive service  As recommended by your PCP or specialist   
Human Immunodeficiency Virus (HIV) Screening (annually for increased risk patients) HIV-1 and HIV-2 by EIA, ISABELA, rapid antibody test, or oral mucosa transudate Patient must be at increased risk for HIV infection per USPSTF guidelines or pregnant. Tests covered annually for patients at increased risk. Pregnant patients may receive up to 3 test during pregnancy. As recommended by your PCP or Specialist  
Ultrasound Screening for Abdominal Aortic Aneurysm (AAA) once Patient must be referred through IPPE and not have had a screening for abdominal aortic aneurysm before under medicare. Limited to patients who meet onf of the following criteria: 
-Men who are 73-68 years old and have smoked more than 100 cigarettes in their lifetime 
-Anyone with a FH of AAA 
-Anyone recommended for screening by the USPSFTF As recommended by your PCP or Specialist 
  
NA = Not Applicable; NI= Not Indicated Advanced care planning: Please bring in a copy of your advanced directives at your convenience to our office so we may scan a copy for our records. Introducing \[facility] [place] [facility]\"" & HEALTH SERVICES! [facility] introduces Trak patient portal. Now you can access parts of your medical record, email your doctor's office, and request medication refills online. 1. In your internet browser, go to [email or web address] 2. Click on the First Time User? Click Here link in the Sign In box. You will see the New Member Sign Up page. 3. Enter your Trak Access Code exactly as it appears below.  You will not need to use this code after youve completed the sign-up process. If you do not sign up before the expiration date, you must request a new code. · Vadio Access Code: [email or web address] Expires: 2/12/2018 10:10 AM 
 
4. Enter the last four digits of your Social Security Number (xxxx) and Date of Birth (mm/dd/yyyy) as indicated and click Submit. You will be taken to the next sign-up page. 5. Create a Vadio ID. This will be your Vadio login ID and cannot be changed, so think of one that is secure and easy to remember. 6. Create a Vadio password. You can change your password at any time. 7. Enter your Password Reset Question and Answer. This can be used at a later time if you forget your password. 8. Enter your e-mail address. You will receive e-mail notification when new information is available in [address] 9. Click Sign Up. You can now view and download portions of your medical record. 10. Click the Download Summary menu link to download a portable copy of your medical information. If you have questions, please visit the Frequently Asked Questions section of the Vadio website. Remember, Vadio is NOT to be used for urgent needs. For medical emergencies, dial 911. Now available from your iPhone and Android! Please provide this summary of care documentation to your next provider. Your primary care clinician is listed as Mitra Mackey. Jose A Iverson. If you have any questions after today's visit, please call [phone].

## 2017-11-14 NOTE — PROGRESS NOTES
German Branham 1940, is a 69 y.o. female, who is seen today for reevaluation of hypertension number lipidemia impaired fasting glucose DJD. She feels generally well but her arthritis bothers her a little more particularly in a couple of her fingers, the second and third fingers of the right hand. She is requiring no medication for this. She takes all her medicine correctly and follows a no added salt diet and avoid sweets in her diet. Weight is stable. Her blood pressure several weeks ago was a little higher than usual.  She is having no chest pain or dyspnea. Past Medical History:   Diagnosis Date    Diabetes ([facility] [address]     Diverticulosis of sigmoid colon 11/13/15    mild; Dr. Tom Guadarrama Eczema     Elevated cholesterol     Family history of diabetes mellitus (DM)     Glaucoma     Hyperlipidemia     Hypertension     Indigestion     Interpolated PVCs      Past Surgical History:   Procedure Laterality Date    HX BREAST BIOPSY      HX HYSTERECTOMY  1981    STEPHAN/BSO    HX OOPHORECTOMY      right     Current Outpatient Prescriptions   Medication Sig Dispense Refill    hydroCHLOROthiazide (MICROZIDE) 12.5 mg capsule Take 1 capsule by mouth  daily 90 Cap 3    lisinopril (PRINIVIL, ZESTRIL) 10 mg tablet Take 1 tablet by mouth  daily 90 Tab 3    estradiol (CLIMARA) 0.1 mg/24 hr Apply 1 patch every 7 days 12 Patch 3    bimatoprost (LUMIGAN) 0.03 % ophthalmic drops Administer 1 Drop to both eyes every evening.  DORZOLAMIDE HCL/TIMOLOL MALEAT (DORZOLAMIDE-TIMOLOL OP) Apply  to eye.  MULTIVITAMIN PO Take  by mouth.  CALCIUM CARBONATE/VITAMIN D3 (CALCIUM WITH VITAMIN D PO) Take  by mouth.        No Known Allergies  Social History     Social History    Marital status:      Spouse name: N/A    Number of children: N/A    Years of education: N/A     Social History Main Topics    Smoking status: Never Smoker    Smokeless tobacco: Never Used    Alcohol use No      Comment: with dinner    Drug use: No    Sexual activity: Not Currently     Other Topics Concern    None     Social History Narrative     Visit Vitals    /64    Pulse (!) 48    Temp 97.4 °F (36.3 °C) (Oral)    Resp 14    Ht 5' 1.5\" (1.562 m)    Wt 119 lb (54 kg)    SpO2 98%    BMI 22.12 kg/m2     The patient is a well-developed well-nourished female in no apparent distress. HEENT: Pupils are equal and react to light and extraocular movements are full. Ear canals and tympanic membranes appear normal. Oral cavity appears normal with no oral lesions. Neck: Carotids are 2+ without bruits. No adenopathy or thyromegaly. Lungs are clear to percussion. I hear no wheezing, rales or rhonchi. Heart reveals a regular rhythm with no murmur, gallop, click or rub. The apical impulse is in the fifth interspace at the midclavicular line. Abdomen is soft and nontender with no hepatosplenomegaly or masses. Bowel sounds are normoactive and there is no distention or tympany. Extremities reveal no clubbing cyanosis or edema. Pulses are 2+. Skin reveals no suspicious skin growths. Breasts reveal no masses, skin or nipple abnormalities. No axillary adenopathy. Results for orders placed or performed during the hospital encounter of 11/10/17   CBC WITH AUTOMATED DIFF   Result Value Ref Range    WBC 5.5 4.6 - 13.2 K/uL    RBC 4.00 (L) 4.20 - 5.30 M/uL    HGB 12.3 12.0 - 16.0 g/dL    HCT 37.8 35.0 - 45.0 %    MCV 94.5 74.0 - 97.0 FL    MCH 30.8 24.0 - 34.0 PG    MCHC 32.5 31.0 - 37.0 g/dL    RDW 13.0 11.6 - 14.5 %    PLATELET 142 489 - 591 K/uL    MPV 11.4 9.2 - 11.8 FL    NEUTROPHILS 49 40 - 73 %    LYMPHOCYTES 36 21 - 52 %    MONOCYTES 10 3 - 10 %    EOSINOPHILS 4 0 - 5 %    BASOPHILS 1 0 - 2 %    ABS. NEUTROPHILS 2.8 1.8 - 8.0 K/UL    ABS. LYMPHOCYTES 2.0 0.9 - 3.6 K/UL    ABS. MONOCYTES 0.5 0.05 - 1.2 K/UL    ABS. EOSINOPHILS 0.2 0.0 - 0.4 K/UL    ABS.  BASOPHILS 0.1 (H) 0.0 - 0.06 K/UL    DF AUTOMATED     T4, FREE   Result Value Ref Range    T4, Free 0.8 0.7 - 1.5 NG/DL   LIPID PANEL   Result Value Ref Range    LIPID PROFILE          Cholesterol, total 221 (H) <200 MG/DL    Triglyceride 79 <150 MG/DL    HDL Cholesterol 66 (H) 40 - 60 MG/DL    LDL, calculated 139.2 (H) 0 - 100 MG/DL    VLDL, calculated 15.8 MG/DL    CHOL/HDL Ratio 3.3 0 - 5.0     METABOLIC PANEL, COMPREHENSIVE   Result Value Ref Range    Sodium 137 136 - 145 mmol/L    Potassium 4.1 3.5 - 5.5 mmol/L    Chloride 100 100 - 108 mmol/L    CO2 28 21 - 32 mmol/L    Anion gap 9 3.0 - 18 mmol/L    Glucose 101 (H) 74 - 99 mg/dL    BUN 11 7.0 - 18 MG/DL    Creatinine 0.60 0.6 - 1.3 MG/DL    BUN/Creatinine ratio 18 12 - 20      GFR est AA >60 >60 ml/min/1.73m2    GFR est non-AA >60 >60 ml/min/1.73m2    Calcium 8.5 8.5 - 10.1 MG/DL    Bilirubin, total 0.2 0.2 - 1.0 MG/DL    ALT (SGPT) 20 13 - 56 U/L    AST (SGOT) 15 15 - 37 U/L    Alk. phosphatase 48 45 - 117 U/L    Protein, total 6.7 6.4 - 8.2 g/dL    Albumin 3.7 3.4 - 5.0 g/dL    Globulin 3.0 2.0 - 4.0 g/dL    A-G Ratio 1.2 0.8 - 1.7     TSH 3RD GENERATION   Result Value Ref Range    TSH 3.52 0.36 - 3.74 uIU/mL   HEMOGLOBIN A1C WITH EAG   Result Value Ref Range    Hemoglobin A1c 6.2 (H) 4.2 - 5.6 %    Est. average glucose 131 mg/dL     Assessment: #1. Retention now inadequately controlled last 2 visits, the nurse checked it and I checked it twice in the resting state and all of the readings are similar as above. We will increase lisinopril and hydrochlorothiazide to 2025 1 daily. She will continue no added salt diet. #2.  Impaired fasting glucose, she is going to continue to keep her weight down and avoid sweets. 3.  Hyperlipidemia doing well. She will continue current medical regimen. #4.  DJD may be a little worse, she may use Tylenol or Advil as needed. #5.  History of rather severe sciatica, 2 episodes but that has not recurred for over 2 years.   She has medication should that be needed at home, dexamethasone and hydrocodone. Follow-up in 3 months to recheck her blood pressure heart lungs etc. and be sure she is tolerating her medications well. She will get a flu shot now and has a mammogram scheduled soon and did have a Medicare wellness evaluation today and I agree with Hebert's note. He Beurmen MD FACP    Please note: This document has been produced using voice recognition software. Unrecognized errors in transcription may be present.

## 2017-11-14 NOTE — PATIENT INSTRUCTIONS
Medicare Part B Preventive Services Limitations Recommendation Follow-up Action Needed    Bone Mass Measurement  (age 72 & older, biennial) Requires diagnosis related to osteoporosis or estrogen deficiency. Biennial benefit unless patient has history of long-term glucocorticoid tx or baseline is needed because initial test was by other method Last: 9/30/2004    A DEXA scan is recommended after you turn 72years of age to determine your risk for osteoporosis Next: Follow up as recommended by primary care provider and/or specialist     Colorectal Cancer Screening  -Fecal occult blood test (annual)  -Flexible sigmoidoscopy (5y)  -Screening colonoscopy (10y)  -Barium Enema Normally recommended for patients age 48 - 78  Last: 12/15/2015    Every 5-10 years depending on your colonoscopy result starting at age 48 years Next: No longer needed based on age, follow up as recommended by primary care provider and/or specialist   Glaucoma Screening (no USPSTF recommendation) Diabetes mellitus, family history, , age 48 or over,  American, age 72 or over Last: 11/2017    Every year, or as directed by provider Next: No longer needed based on age, follow up as recommended by primary care provider and/or specialist or at least every 2 years    Screening Mammography (biennial age 54-69) Annually (age 36 or over) Last: 11/2016 Next: Follow up as recommended by primary care provider and/or specialist    Screening Pap Tests and Pelvic Examination (up to age 72 and after 72 if unknown history or abnormal study last 10 years) Every 24 months except high risk Last: N/A Next: Discuss with your doctor if this screening is appropriate for you.    Cardiovascular Screening Blood Tests (every 5 years)  Total cholesterol, HDL, Triglycerides Order as a panel if possible Last: 5/2017    Every Year Next: Follow up as recommended by primary care provider and/or specialist    Diabetes Screening Tests (at least every 3 years, Medicare covers annually or at 6-month intervals for prediabetic patients)    Fasting blood sugar (FBS) or glucose tolerance test (GTT) Patient must be diagnosed with one of the following:  -Hypertension, Dyslipidemia, obesity, previous impaired FBS or GTT  Or any two of the following: overweight, FH of diabetes, age ? 72, history of gestational diabetes, birth of baby weighing more than 9 pounds Last: 5/2017    Every 3-6 months depending on your result    Every 3 years Next: Follow up as recommended by primary care provider and/or specialist    Diabetes Self-Management Training (DSMT) (no USPSTF recommendation) Requires referral by treating physician for patient with diabetes or renal disease. 10 hours of initial DSMT session of no less than 30 minutes each in a continuous 12-month period. 2 hours of follow-up DSMT in subsequent years. Last: N/A Talk to your doctor if you are interested in a refresher course. Medical Nutrition Therapy (MNT) (for diabetes or renal disease not recommended schedule) Requires referral by treating physician for patient with diabetes or renal disease. Can be provided in same year as diabetes self-management training (DSMT), and CMS recommends medical nutrition therapy take place after DSMT. Up to 3 hours for initial year and 2 hours in subsequent years. Last: N/A Talk to your doctor if you are interested in a refresher course.    Seasonal Influenza Vaccination (annually)  Last: Done today     Every Fall Please consider getting one every fall    Pneumococcal Vaccination (once after 65)  Last:  Pneumovax: 5/2012    Prevnar-13: Due now   Next: 1 dose of the Prevnar-13 is recommended to finish the vaccine series    Shingles Vaccination  Last: Done     A shingles vaccine is recommended once in a lifetime after age 61 Vaccination series complete      Tetanus, Diphtheria and Pertussis (Tdap) Vaccination Booster One Booster as an adult and then tetanus every 10 years or as indicated Last: N/A .N/A   Hepatitis B Vaccinations (if medium/high risk) Medium/high risk factors:  End-stage renal disease,  Hemophiliacs who received Factor VIII or IX concentrates, Clients of institutions for the mentally retarded, Persons who live in the same house as a HepB virus carrier, Homosexual men, Illicit injectable drug abusers. Last: N/A Next: N/A   Counseling to Prevent Tobacco Use (up to 8 sessions per year)  - Counseling greater than 3 and up to 10 minutes  - Counseling greater than 10 minutes Patients must be asymptomatic of tobacco-related conditions to receive as preventive service  As recommended by your PCP or specialist    Human Immunodeficiency Virus (HIV) Screening (annually for increased risk patients)  HIV-1 and HIV-2 by EIA, ISABELA, rapid antibody test, or oral mucosa transudate Patient must be at increased risk for HIV infection per USPSTF guidelines or pregnant. Tests covered annually for patients at increased risk. Pregnant patients may receive up to 3 test during pregnancy. As recommended by your PCP or Specialist   Ultrasound Screening for Abdominal Aortic Aneurysm (AAA) once Patient must be referred through IPPE and not have had a screening for abdominal aortic aneurysm before under medicare. Limited to patients who meet onf of the following criteria:  -Men who are 73-68 years old and have smoked more than 100 cigarettes in their lifetime  -Anyone with a FH of AAA  -Anyone recommended for screening by the USPSFTF    As recommended by your PCP or Specialist     NA = Not Applicable; NI= Not Indicated     Advanced care planning: Please bring in a copy of your advanced directives at your convenience to our office so we may scan a copy for our records.

## 2017-11-14 NOTE — PROGRESS NOTES
Dr. Suyapa Aguilar referred Shelbie Jaramillo (1940) a 69 y.o. female for a Medicare Annual Wellness Visit ([phone])    This is a Subsequent Medicare Annual Wellness Visit providing Personalized Prevention Plan Services (PPPS) (Performed 12 months after initial AWV and PPPS )    I have reviewed the patient's medical history in detail and updated the computerized patient record. History     Past Medical History:   Diagnosis Date    Diabetes ([facility] [place] [address]     Diverticulosis of sigmoid colon 11/13/15    mild; Dr. Lindsay Knapp Eczema     Elevated cholesterol     Family history of diabetes mellitus (DM)     Glaucoma     Hyperlipidemia     Hypertension     Indigestion     Interpolated PVCs       Past Surgical History:   Procedure Laterality Date    HX BREAST BIOPSY      HX HYSTERECTOMY  1981    STEPHAN/BSO    HX OOPHORECTOMY      right     Current Outpatient Prescriptions   Medication Sig Dispense Refill    lisinopril-hydroCHLOROthiazide (PRINZIDE, ZESTORETIC) 20-25 mg per tablet Take 1 Tab by mouth daily. 90 Tab 3    estradiol (CLIMARA) 0.1 mg/24 hr Apply 1 patch every 7 days 12 Patch 3    bimatoprost (LUMIGAN) 0.03 % ophthalmic drops Administer 1 Drop to both eyes every evening.  DORZOLAMIDE HCL/TIMOLOL MALEAT (DORZOLAMIDE-TIMOLOL OP) Apply  to eye.  MULTIVITAMIN PO Take  by mouth.  CALCIUM CARBONATE/VITAMIN D3 (CALCIUM WITH VITAMIN D PO) Take  by mouth.        No Known Allergies  Family History   Problem Relation Age of Onset   [address] [facility] Glaucoma Mother     Other Father      cerebral aneurysm    Hypertension Sister     Diabetes Sister     Hypertension Sister     Diabetes Paternal Uncle      Social History   Substance Use Topics    Smoking status: Never Smoker    Smokeless tobacco: Never Used    Alcohol use No      Comment: with dinner     Patient Active Problem List   Diagnosis Code    Eczema L30.9    Family history of diabetes mellitus (DM) Z83.3    Glaucoma H40.9    Generalized osteoarthrosis, involving multiple sites M15.9    Prediabetes R73.03    Essential hypertension I10    Hyperlipidemia LDL goal <130 E78.5    Advance directive discussed with patient Z70.80    Raynaud's disease without gangrene I73.00       Depression Risk Factor Screening:     PHQ over the last two weeks 11/14/2017   Little interest or pleasure in doing things Not at all   Feeling down, depressed or hopeless Not at all   Total Score PHQ 2 0     Alcohol Risk Factor Screening: You do not drink alcohol or very rarely. Functional Ability and Level of Safety:     Hearing Loss   Hearing is good. Activities of Daily Living   The home contains: no safety equipment  Patient does total self care    Requires assistance with: no ADLs  ADL Assessment 11/14/2017   Feeding yourself No Help Needed   Getting from bed to chair No Help Needed   Getting dressed No Help Needed   Bathing or showering No Help Needed   Walk across the room (includes cane/walker) No Help Needed   Using the telphone No Help Needed   Taking your medications No Help Needed   Preparing meals No Help Needed   Managing money (expenses/bills) No Help Needed   Moderately strenuous housework (laundry) No Help Needed   Shopping for personal items (toiletries/medicines) No Help Needed   Shopping for groceries No Help Needed   Driving No Help Needed   Climbing a flight of stairs No Help Needed   Getting to places beyond walking distances No Help Needed       Fall Risk     Fall Risk Assessment, last 12 mths 11/14/2017   Able to walk? Yes   Fall in past 12 months? No     Abuse Screen   Patient is not abused    Abuse Screening Questionnaire 11/14/2017   Do you ever feel afraid of your partner? N   Are you in a relationship with someone who physically or mentally threatens you? N   Is it safe for you to go home?  Y       Cognitive Screening     Evaluation of Cognitive Function:  Has your family/caregiver stated any concerns about your memory: no  Normal, Mini Cog test    Mood/affect:  neutral  Appearance: age appropriate, well dressed and within normal Limits  Family member/caregiver input: N/A    No exam performed by pharmacist today, not required for Medicare Annual Wellness Visit. Patient to be seen by Dr. Christiana Zaragoza separately. Patient Care Team:  Christiana Zaragoza MD as PCP - General (Internal Medicine)  Tomi Bowie RN as Ambulatory Care Navigator (Internal Medicine)  Doron Castillo MD (Surgery)  Steph Brown MD (Ophthalmology)    Advice/Referrals/Counseling   Education and counseling provided:  End-of-Life planning (with patient's consent)  Pneumococcal Vaccine  Influenza Vaccine  Screening Mammography  Screening Pap and pelvic (covered once every 2 years)  Colorectal cancer screening tests  Cardiovascular screening blood test  Bone mass measurement (DEXA)  Screening for glaucoma  Diabetes screening test  Tdap / Zoster vaccination recommendations     Assessment/Plan       ICD-10-CM ICD-9-CM    1. Essential hypertension I10 401.9    2. Encounter for immunization Z23 V03.89 INFLUENZA VIRUS VACCINE, HIGH DOSE SEASONAL, PRESERVATIVE FREE   3. Generalized osteoarthrosis, involving multiple sites M15.9 715.09    4. Hyperlipidemia LDL goal <130 E78.5 272.4    5. Impaired fasting glucose R73.01 790.21    6. Medicare annual wellness visit, subsequent [facility].00 [facility]70.0 [facility]   7. Screening for alcoholism Z13.89 V79.1    8. Screening for depression [facility]13.89 [facility].0 [facility]   . 9. Medication Reconciliation: Performed today and patient did not bring her medications to the visit. and the following changes were made.    Discontinued: below   Additions: none    Changes / other discrepancies: none     Medications Discontinued During This Encounter   Medication Reason    dexamethasone (DECADRON) 4 mg tablet Therapy Completed    HYDROcodone-acetaminophen (NORCO) 7.5-325 mg per tablet Therapy Completed    hydroCHLOROthiazide (MICROZIDE) 12.5 mg capsule Dose Adjustment    lisinopril (PRINIVIL, ZESTRIL) 10 mg tablet Dose Adjustment       10. Immunizations: Patient confirmed the following records of vaccinations are correct and current.   -Pneumococcal: Due for Prevnar-13. Will defer today because she is getting the flu vaccine. Will also discuss with son who is a doctor    -Influenza: Done today     -Zoster:  Done a number of years ago at [address]    -Tdap:  N/A    Immunization History   Administered Date(s) Administered    H1N1 Influenza Virus Vaccine 12/17/2009    Influenza High Dose Vaccine PF 11/11/2015, 11/14/2016    Influenza Vaccine 10/04/2013, 10/20/2014    Influenza Vaccine Split 10/05/2011, 10/23/2012    Influenza Vaccine Whole 10/15/2010    ZZZ-RETIRED (DO NOT USE) Pneumococcal Vaccine (Unspecified Type) 05/23/2012       11. Screenings: (see patient instructions for chart/information):   -DEXA scan: Done    -Colonoscopy: Done, aged out of further screening per report   -Glaucoma screening/Eye exam: Saw eye doctor last week    -Mammogram: Has follow up appointment at the end of November     -Lipid panel: Done    -Diabetes: Done      12. Advanced Care Planning: The patient has advanced directives completed at home. Advised patient to bring a copy to the office to be scanned into the chart. Patient verbalized understanding of information presented. Answered all of the patient's questions. AVS information was reviewed with patient and will be printed on checkout.     Dagmar Sorenson, ManoharD, BCPS

## 2017-12-13 RX ORDER — ESTRADIOL 0.1 MG/D
PATCH TRANSDERMAL
Qty: 12 PATCH | Refills: 3 | Status: SHIPPED | OUTPATIENT
Start: 2017-12-13 | End: 2018-02-26 | Stop reason: SDUPTHER

## 2018-02-20 ENCOUNTER — TELEPHONE (OUTPATIENT)
Dept: INTERNAL MEDICINE CLINIC | Age: 78
End: 2018-02-20

## 2018-02-20 NOTE — TELEPHONE ENCOUNTER
PT has ov tomorrow- she is concerned about sick people in the waiting room- I offered to r/s her appt or said she could put on a mask when she gets here but she wants your opinion.

## 2018-02-21 ENCOUNTER — OFFICE VISIT (OUTPATIENT)
Dept: INTERNAL MEDICINE CLINIC | Age: 78
End: 2018-02-21

## 2018-02-21 VITALS
DIASTOLIC BLOOD PRESSURE: 66 MMHG | SYSTOLIC BLOOD PRESSURE: 138 MMHG | OXYGEN SATURATION: 98 % | RESPIRATION RATE: 12 BRPM | WEIGHT: 121 LBS | HEART RATE: 90 BPM | HEIGHT: 62 IN | TEMPERATURE: 98.4 F | BODY MASS INDEX: 22.26 KG/M2

## 2018-02-21 DIAGNOSIS — Z23 ENCOUNTER FOR IMMUNIZATION: ICD-10-CM

## 2018-02-21 DIAGNOSIS — I73.00 RAYNAUD'S DISEASE WITHOUT GANGRENE: ICD-10-CM

## 2018-02-21 DIAGNOSIS — R73.01 IMPAIRED FASTING GLUCOSE: ICD-10-CM

## 2018-02-21 DIAGNOSIS — I10 ESSENTIAL HYPERTENSION: Primary | ICD-10-CM

## 2018-02-21 NOTE — PROGRESS NOTES
Crissy Sanchez 1940, is a 69 y.o. female, who is seen today for reevaluation of hypertension impaired fasting glucose DJD postmenopausal hot flashes and Raynaud's disease. She tells me that throughout the winter occasionally she notices whiteness the tips of some of her fingers particularly on the right hand but there is no pain. It clears upon warming. She has told me about this before but asked like it is the first time she told me today. Sure we have discussed this in the past.  She checks her blood pressure occasionally with her wrist manometer and typically gets readings in the 120s lately. We increased her medicine last visit when blood pressure was up in the 160 range. She has a history of sciatica but no recent recurrence. She has a history of impaired fasting glucose and eats a healthy diet, weight is up just 2 pounds from 3 months ago through the holidays. She has postmenopausal hot flashes and when she went longer than a week without changing her estrogen patch she started feeling hot flashes and a bit fatigued and then noticed she needed to change her patch. Past Medical History:   Diagnosis Date    Diverticulosis of sigmoid colon 11/13/15    mild; Dr. Talib Vasques Eczema     Elevated cholesterol     Family history of diabetes mellitus (DM)     Glaucoma     Hyperlipidemia     Hypertension     Indigestion     Interpolated PVCs      Current Outpatient Prescriptions   Medication Sig Dispense Refill    estradiol (CLIMARA) 0.1 mg/24 hr APPLY 1 PATCH EVERY 7 DAYS 12 Patch 3    lisinopril-hydroCHLOROthiazide (PRINZIDE, ZESTORETIC) 20-25 mg per tablet Take 1 Tab by mouth daily. 90 Tab 3    bimatoprost (LUMIGAN) 0.03 % ophthalmic drops Administer 1 Drop to both eyes every evening.  DORZOLAMIDE HCL/TIMOLOL MALEAT (DORZOLAMIDE-TIMOLOL OP) Apply  to eye.  MULTIVITAMIN PO Take  by mouth.  CALCIUM CARBONATE/VITAMIN D3 (CALCIUM WITH VITAMIN D PO) Take  by mouth. Visit Vitals    /66 (BP 1 Location: Right arm, BP Patient Position: Sitting)    Pulse 90    Temp 98.4 °F (36.9 °C) (Oral)    Resp 12    Ht 5' 1.5\" (1.562 m)    Wt 121 lb (54.9 kg)    SpO2 98%    BMI 22.49 kg/m2     Carotids are 2+ without bruits. Lungs are clear to percussion. Good breath sounds with no wheezing or crackles. Heart reveals a regular rhythm with normal S1 and S2 no murmur gallop click or rub. Apical impulse is not palpable. Abdomen is soft and nontender with no hepatosplenomegaly or masses and no bruits. Extremities reveal no clubbing cyanosis or edema. Pulses are 2+. Assessment: #1. Hypertension doing better, she will continue lisinopril with hydrochlorothiazide, 2025, 1 daily. She will check blood pressure 3 times in a row in the resting state with arm supported and do this approximately once every week or once every 2 weeks and bring in those readings and her blood pressure cuff to her next office visit in 3 months. #2.  Impaired fasting glucose, we will check glucose and hemoglobin A1c in 3 months. She will continue very healthy diet and avoid significant weight gain. #3. Symptoms basically diagnostic of Raynaud's disease, if she becomes more symptomatic to where it actually hurts or happens very frequently I told her we could switch her blood pressure medicine to amlodipine or nifedipine and that would lower blood pressure and probably reliably treat renewed symptoms. #4.  History of sciatica doing well for well over a year. She will call if this recurs. #5.  Postmenopausal symptoms when she is off hormone replacement therapy, since she is a non-smoker and at low risk for vascular events she will continue estradiol 0.1 mg topically weekly, explained to her the risks and benefits of this therapy and she prefers to continue to take the medicine.     Follow-up in 3 months with lab, and she is reminded to bring her blood pressure cuff and blood pressure measurements from home, 3 each time she checks blood pressure. He Steele MD FACP    Please note: This document has been produced using voice recognition software. Unrecognized errors in transcription may be present. She does agree to have PCV 13 administration now, we discussed this at length including possible side effects and anticipated benefits.

## 2018-02-21 NOTE — MR AVS SNAPSHOT
Oleg Keene 
 
 
 [address], [address] [facility] 
[phone] Patient: Wendy Breaux MRN: [id number] Visit Information Date & Time Provider Department Dept. Phone Encounter #  
 2/21/2018 10:00 AM Linda Rothman MD [facility] [address] [place] [address] [id number] Your Appointments 5/14/2018 10:05 AM  
LAB with IOC NURSE VISIT [facility] [address] [place] [address] [facility] Appt Note: fasting labs [address], Suite [facility] [address] [place]  
  
   
 [address], [address]  
  
    
 5/24/2018 10:00 AM  
Office Visit with Linda Rothman MD  
[facility] [address] [place] [address]) Appt Note: 3 month ov with labs [address], [address] [place]  
  
   
 [address], [address] Upcoming Health Maintenance Date Due Pneumococcal 65+ Low/Medium Risk (2 of 2 - PPSV23) 5/23/2017 MEDICARE YEARLY EXAM 11/15/2018 GLAUCOMA SCREENING Q2Y 11/9/2019 COLONOSCOPY 11/13/2025 DTaP/Tdap/Td series (2 - Td) 11/14/2027 Allergies as of 2/21/2018  Review Complete On: 2/21/2018 By: Linda Rothman MD  
 No Known Allergies Current Immunizations  Reviewed on 11/14/2017 Name Date H1N1 FLU VACCINE 12/17/2009 Influenza High Dose Vaccine PF 11/14/2017 11:46 AM, 11/14/2016, 11/11/2015 Influenza Vaccine 10/20/2014  2:55 PM, 10/4/2013 12:06 PM  
 Influenza Vaccine Split 10/23/2012 11:50 AM, 10/5/2011 Influenza Vaccine Whole 10/15/2010 ZZZ-RETIRED (DO NOT USE) Pneumococcal Vaccine (Unspecified Type) 5/23/2012 Not reviewed this visit You Were Diagnosed With   
  
 Codes Comments Essential hypertension    -  Primary ICD-10-CM: I10 
ICD-9-CM: 401.9  Impaired fasting glucose     ICD-10-CM: R73.01 
ICD-9-CM: 790.21   
 Raynaud's disease without gangrene     ICD-10-CM: I73.00 ICD-9-CM: 443.0 Vitals BP Pulse Temp Resp Height(growth percentile) Weight(growth percentile)  
 138/66 (BP 1 Location: Right arm, BP Patient Position: Sitting) 90 98.4 °F (36.9 °C) (Oral) 12 5' 1.5\" (1.562 m) 121 lb (54.9 kg) SpO2 BMI OB Status Smoking Status 98% 22.49 kg/m2 Hysterectomy Never Smoker Vitals History BMI and BSA Data Body Mass Index Body Surface Area  
 22.49 kg/m 2 1.54 m 2 Preferred Pharmacy Pharmacy Name Phone [address] [facility], [address] [place] [address] [address] Sa[address] Mendoza [address] Your Updated Medication List  
  
   
This list is accurate as of 2/21/18 11:00 AM.  Always use your most recent med list.  
  
  
  
  
 CALCIUM WITH VITAMIN D PO Take  by mouth. DORZOLAMIDE-TIMOLOL OP Apply  to eye.  
  
 estradiol 0.1 mg/24 hr  
Commonly known as:  CLIMARA APPLY 1 PATCH EVERY 7 DAYS  
  
 lisinopril-hydroCHLOROthiazide 20-25 mg per tablet Commonly known as:  Frost Spearing Take 1 Tab by mouth daily. LUMIGAN 0.03 % ophthalmic drops Generic drug:  bimatoprost  
Administer 1 Drop to both eyes every evening. MULTIVITAMIN PO Take  by mouth. To-Do List   
 Around 05/14/2018 Lab:  GLUCOSE, RANDOM Around 05/14/2018 Lab:  HEMOGLOBIN A1C WITH EAG Patient Instructions At your next visit please bring in blood pressure readings and your wrist blood pressure cuff. Introducing [facility]! Deion Jernigan introduces [employer] patient portal. Now you can access parts of your medical record, email your doctor's office, and request medication refills online. 1. In your internet browser, go to [email or web address] 2. Click on the First Time User? Click Here link in the Sign In box. You will see the New Member Sign Up page. 3. Enter your [employer] Access Code exactly as it appears below.  You will not need to use this code after youve completed the sign-up process. If you do not sign up before the expiration date, you must request a new code. · [employer] Access Code: [email or web address] Expires: 5/22/2018 10:00 AM 
 
4. Enter the last four digits of your Social Security Number (xxxx) and Date of Birth (mm/dd/yyyy) as indicated and click Submit. You will be taken to the next sign-up page. 5. Create a Kickstarter ID. This will be your Kickstarter login ID and cannot be changed, so think of one that is secure and easy to remember. 6. Create a [employer] password. You can change your password at any time. 7. Enter your Password Reset Question and Answer. This can be used at a later time if you forget your password. 8. Enter your e-mail address. You will receive e-mail notification when new information is available in [address]. 9. Click Sign Up. You can now view and download portions of your medical record. 10. Click the Download Summary menu link to download a portable copy of your medical information. If you have questions, please visit the Frequently Asked Questions section of the [employer] website. Remember, [employer] is NOT to be used for urgent needs. For medical emergencies, dial 911. Now available from your iPhone and Android! Please provide this summary of care documentation to your next provider. Your primary care clinician is listed as Rand Ackerman. Jaimee Forde. If you have any questions after today's visit, please call [phone].

## 2018-02-27 ENCOUNTER — DOCUMENTATION ONLY (OUTPATIENT)
Dept: INTERNAL MEDICINE CLINIC | Age: 78
End: 2018-02-27

## 2018-02-27 RX ORDER — ESTRADIOL 0.1 MG/D
PATCH TRANSDERMAL
Qty: 12 PATCH | Refills: 3 | Status: SHIPPED | OUTPATIENT
Start: 2018-02-27 | End: 2019-03-01 | Stop reason: SDUPTHER

## 2018-02-27 NOTE — PROGRESS NOTES
Patient requested Prior auth for Estradiol. PA returned stating a PA not required at this time due to the medication is on the patients plan list of covered drug.

## 2018-03-28 ENCOUNTER — TELEPHONE (OUTPATIENT)
Dept: INTERNAL MEDICINE CLINIC | Age: 78
End: 2018-03-28

## 2018-03-28 ENCOUNTER — OFFICE VISIT (OUTPATIENT)
Dept: INTERNAL MEDICINE CLINIC | Age: 78
End: 2018-03-28

## 2018-03-28 VITALS
WEIGHT: 121.8 LBS | HEIGHT: 62 IN | SYSTOLIC BLOOD PRESSURE: 116 MMHG | RESPIRATION RATE: 14 BRPM | DIASTOLIC BLOOD PRESSURE: 70 MMHG | OXYGEN SATURATION: 100 % | HEART RATE: 60 BPM | BODY MASS INDEX: 22.41 KG/M2 | TEMPERATURE: 98.5 F

## 2018-03-28 DIAGNOSIS — J30.9 ALLERGIC SINUSITIS: Primary | ICD-10-CM

## 2018-03-28 RX ORDER — LORATADINE 10 MG/1
10 TABLET ORAL DAILY
Qty: 30 TAB | Refills: 0 | Status: SHIPPED | OUTPATIENT
Start: 2018-03-28 | End: 2018-05-24 | Stop reason: ALTCHOICE

## 2018-03-28 NOTE — PROGRESS NOTES
Wendy Breaux is a 69 y.o.  female and presents with    Chief Complaint   Patient presents with    Sinus Infection     Patient here for nasal drainage/dryness, redness, headache, teeth pain and pressure behind the eyes. Patient stated she doesnt know if it is allergies or sinus. x 1 week        Subjective:  HPI   Mrs. Rodney Mendez presents today with continued complaints of nasal dryness, itchy ears, sinus pressure over the last week, and teeth pain for last 2-3 days. She reports was last seen by PCP 2/21 with mild complaints of similar complaints. Denies fever, n/v/d, sob, wheezing. History of gluacoma left eye and bilateral cataracts. She is under the care of an eye specialist, last visit reported to patient worsening Glaucoma, no change in eye drops at that time, scheduled follow up in May. Denies change in vision from baseline, n/v, eye pain/redness, fevers. Last dental examination was 2 months ago with no reported issues. The head ache is to the left side behind the eye to forehead, sometimes wakes with headache, but it is mild. Has tried Tylenol. Flonase only used 2-3 days. Did not take any other OTC medications. Additional Concerns: none     ROS   Review of Systems   Constitutional: Negative for chills, fever and malaise/fatigue. HENT: Positive for sinus pain. Negative for congestion, ear discharge, ear pain, hearing loss, nosebleeds, sore throat and tinnitus. Eyes: Positive for blurred vision. Negative for double vision, photophobia, pain, discharge and redness. Respiratory: Negative. Cardiovascular: Negative. Gastrointestinal: Negative. Skin: Negative. Neurological: Positive for headaches. Negative for dizziness. No Known Allergies    Current Outpatient Prescriptions   Medication Sig Dispense Refill    loratadine (CLARITIN) 10 mg tablet Take 1 Tab by mouth daily.  30 Tab 0    estradiol (CLIMARA) 0.1 mg/24 hr APPLY 1 PATCH EVERY 7 DAYS 12 Patch 3    lisinopril-hydroCHLOROthiazide (PRINZIDE, ZESTORETIC) 20-25 mg per tablet Take 1 Tab by mouth daily. 90 Tab 3    bimatoprost (LUMIGAN) 0.03 % ophthalmic drops Administer 1 Drop to both eyes every evening.  DORZOLAMIDE HCL/TIMOLOL MALEAT (DORZOLAMIDE-TIMOLOL OP) Apply  to eye.  CALCIUM CARBONATE/VITAMIN D3 (CALCIUM WITH VITAMIN D PO) Take  by mouth.  MULTIVITAMIN PO Take  by mouth. Social History     Social History    Marital status:      Spouse name: N/A    Number of children: N/A    Years of education: N/A     Occupational History    Not on file.      Social History Main Topics    Smoking status: Never Smoker    Smokeless tobacco: Never Used    Alcohol use No      Comment: with dinner    Drug use: No    Sexual activity: Not Currently     Other Topics Concern    Not on file     Social History Narrative       Past Medical History:   Diagnosis Date    Diverticulosis of sigmoid colon 11/13/15    mild; Dr. Katie Webb Eczema     Elevated cholesterol     Family history of diabetes mellitus (DM)     Glaucoma     Hyperlipidemia     Hypertension     Indigestion     Interpolated PVCs        Past Surgical History:   Procedure Laterality Date    HX BREAST BIOPSY      HX HYSTERECTOMY  1981    STEPHAN/BSO    HX OOPHORECTOMY      right       Family History   Problem Relation Age of Onset   [employer] Glaucoma Mother     Other Father      cerebral aneurysm    Hypertension Sister     Diabetes Sister     Hypertension Sister     Diabetes Paternal Uncle        Objective:  Vitals:    03/28/18 1102   BP: 116/70   Pulse: 60   Resp: 14   Temp: 98.5 °F (36.9 °C)   TempSrc: Oral   SpO2: 100%   Weight: 121 lb 12.8 oz (55.2 kg)   Height: 5' 1.5\" (1.562 m)   PainSc:   2   PainLoc: Face       LABS   Results for orders placed or performed during the hospital encounter of 11/10/17   CBC WITH AUTOMATED DIFF   Result Value Ref Range    WBC 5.5 4.6 - 13.2 K/uL    RBC 4.00 (L) 4.20 - 5.30 M/uL    HGB 12.3 12.0 - 16.0 g/dL    HCT 37.8 35.0 - 45.0 %    MCV 94.5 74.0 - 97.0 FL    MCH 30.8 24.0 - 34.0 PG    MCHC 32.5 31.0 - 37.0 g/dL    RDW 13.0 11.6 - 14.5 %    PLATELET 549 579 - 116 K/uL    MPV 11.4 9.2 - 11.8 FL    NEUTROPHILS 49 40 - 73 %    LYMPHOCYTES 36 21 - 52 %    MONOCYTES 10 3 - 10 %    EOSINOPHILS 4 0 - 5 %    BASOPHILS 1 0 - 2 %    ABS. NEUTROPHILS 2.8 1.8 - 8.0 K/UL    ABS. LYMPHOCYTES 2.0 0.9 - 3.6 K/UL    ABS. MONOCYTES 0.5 0.05 - 1.2 K/UL    ABS. EOSINOPHILS 0.2 0.0 - 0.4 K/UL    ABS. BASOPHILS 0.1 (H) 0.0 - 0.06 K/UL    DF AUTOMATED     T4, FREE   Result Value Ref Range    T4, Free 0.8 0.7 - 1.5 NG/DL   LIPID PANEL   Result Value Ref Range    LIPID PROFILE          Cholesterol, total 221 (H) <200 MG/DL    Triglyceride 79 <150 MG/DL    HDL Cholesterol 66 (H) 40 - 60 MG/DL    LDL, calculated 139.2 (H) 0 - 100 MG/DL    VLDL, calculated 15.8 MG/DL    CHOL/HDL Ratio 3.3 0 - 5.0     METABOLIC PANEL, COMPREHENSIVE   Result Value Ref Range    Sodium 137 136 - 145 mmol/L    Potassium 4.1 3.5 - 5.5 mmol/L    Chloride 100 100 - 108 mmol/L    CO2 28 21 - 32 mmol/L    Anion gap 9 3.0 - 18 mmol/L    Glucose 101 (H) 74 - 99 mg/dL    BUN 11 7.0 - 18 MG/DL    Creatinine 0.60 0.6 - 1.3 MG/DL    BUN/Creatinine ratio 18 12 - 20      GFR est AA >60 >60 ml/min/1.73m2    GFR est non-AA >60 >60 ml/min/1.73m2    Calcium 8.5 8.5 - 10.1 MG/DL    Bilirubin, total 0.2 0.2 - 1.0 MG/DL    ALT (SGPT) 20 13 - 56 U/L    AST (SGOT) 15 15 - 37 U/L    Alk. phosphatase 48 45 - 117 U/L    Protein, total 6.7 6.4 - 8.2 g/dL    Albumin 3.7 3.4 - 5.0 g/dL    Globulin 3.0 2.0 - 4.0 g/dL    A-G Ratio 1.2 0.8 - 1.7     TSH 3RD GENERATION   Result Value Ref Range    TSH 3.52 0.36 - 3.74 uIU/mL   HEMOGLOBIN A1C WITH EAG   Result Value Ref Range    Hemoglobin A1c 6.2 (H) 4.2 - 5.6 %    Est. average glucose 131 mg/dL       TESTS  none    PE  Physical Exam   Constitutional: She is oriented to person, place, and time.  She appears well-developed and well-nourished. No distress. HENT:   Head: Normocephalic and atraumatic. Eyes: Conjunctivae and EOM are normal. Pupils are equal, round, and reactive to light. Right eye exhibits no discharge and no exudate. Left eye exhibits no discharge and no exudate. Right conjunctiva is not injected. Left conjunctiva is not injected. Right eye exhibits normal extraocular motion. Left eye exhibits normal extraocular motion. Right pupil is round and reactive. Left pupil is round and reactive. Neck: Normal range of motion. Neck supple. Cardiovascular: Normal rate, regular rhythm, normal heart sounds and intact distal pulses. Pulmonary/Chest: Effort normal and breath sounds normal. No respiratory distress. She has no wheezes. She has no rales. She exhibits no tenderness. Abdominal: Soft. Bowel sounds are normal.   Musculoskeletal: Normal range of motion. Lymphadenopathy:     She has no cervical adenopathy. Neurological: She is alert and oriented to person, place, and time. Skin: Skin is warm and dry. She is not diaphoretic. Psychiatric: She has a normal mood and affect. Her behavior is normal. Judgment and thought content normal.   Vitals reviewed. Assessment/Plan:    1. Allergic sinusitis- Nasal saline rinses followed by Flonase prior to bedtime. Claritin daily. Monitor vision and for eye pain/redness, headaches, GI symptoms- if present go to the ED, history of open angle glaucoma. Follow up if worsening symptoms. Lab review: no lab studies available for review at time of visit    Today's Visit:   Diagnoses and all orders for this visit:    1. Allergic sinusitis    Other orders  -     loratadine (CLARITIN) 10 mg tablet; Take 1 Tab by mouth daily. Health Maintenance: Up to date. I have discussed the diagnosis with the patient and the intended plan as seen in the above orders. The patient has received an after-visit summary and questions were answered concerning future plans.   I have discussed medication side effects and warnings with the patient as well. I have reviewed the plan of care with the patient, accepted their input and they are in agreement with the treatment goals. Follow-up Disposition: Not on File   More than 1/2 of this 15 minute visit was spent in counseling and coordination of care, as described above.     RITA Lagos  Internist of [address], [address] [place] [address] Str.  Phone: [phone]  Fax: [phone]

## 2018-03-28 NOTE — PROGRESS NOTES
1. Have you been to the ER, urgent care clinic or hospitalized since your last visit? NO.     2. Have you seen or consulted any other health care providers outside of the [address] [place] [address] since your last visit (Include any pap smears or colon screening)? NO      Do you have an Advanced Directive? NO    Would you like information on Advanced Directives?  NO

## 2018-03-28 NOTE — MR AVS SNAPSHOT
[facility] 
 
 
 [address], [address] [facility] 
[phone] Patient: Kendra Mccall MRN: [id number] Visit Information Date & Time Provider Department Dept. Phone Encounter #  
 3/28/2018 11:00 AM Raquel Van NP [facility] [address] [place] [address] [phone] [id number] Your Appointments 5/11/2018  9:25 AM  
LAB with IOC NURSE VISIT [facility] [address] [place] [address] ([facility]) Appt Note: fasting labs; RESCHEDULED FROM 05/14/2018  
 [address] [place]  
  
   
 [address], [address]  
  
    
 5/24/2018 10:00 AM  
Office Visit with Nargis Terry MD  
[facility] [address] [place] [address]) Appt Note: 3 month ov with labs [address], Suite [facility] [address] [place]  
  
   
 [address], [address] Upcoming Health Maintenance Date Due  
 MEDICARE YEARLY EXAM 11/15/2018 GLAUCOMA SCREENING Q2Y 11/9/2019 COLONOSCOPY 11/13/2025 DTaP/Tdap/Td series (2 - Td) 11/14/2027 Allergies as of 3/28/2018  Review Complete On: 3/28/2018 By: Tere García No Known Allergies Current Immunizations  Reviewed on 11/14/2017 Name Date H1N1 FLU VACCINE 12/17/2009 Influenza High Dose Vaccine PF 11/14/2017 11:46 AM, 11/14/2016, 11/11/2015 Influenza Vaccine 10/20/2014  2:55 PM, 10/4/2013 12:06 PM  
 Influenza Vaccine Split 10/23/2012 11:50 AM, 10/5/2011 Influenza Vaccine Whole 10/15/2010 Pneumococcal Conjugate (PCV-13) 2/21/2018 11:06 AM  
 ZZZ-RETIRED (DO NOT USE) Pneumococcal Vaccine (Unspecified Type) 5/23/2012 Not reviewed this visit Vitals BP Pulse Temp Resp Height(growth percentile) Weight(growth percentile)  116/70 (BP 1 Location: Left arm, BP Patient Position: Sitting) 60 98.5 °F (36.9 °C) (Oral) 14 5' 1.5\" (1.562 m) 121 lb 12.8 oz (55.2 kg) SpO2 BMI OB Status Smoking Status 100% 22.64 kg/m2 Hysterectomy Never Smoker Vitals History BMI and BSA Data Body Mass Index Body Surface Area  
 22.64 kg/m 2 1.55 m 2 Preferred Pharmacy Pharmacy Name Phone [facility] [address] [facility] [phone] Your Updated Medication List  
  
   
This list is accurate as of 3/28/18 11:43 AM.  Always use your most recent med list.  
  
  
  
  
 CALCIUM WITH VITAMIN D PO Take  by mouth. DORZOLAMIDE-TIMOLOL OP Apply  to eye.  
  
 estradiol 0.1 mg/24 hr  
Commonly known as:  CLIMARA APPLY 1 PATCH EVERY 7 DAYS  
  
 lisinopril-hydroCHLOROthiazide 20-25 mg per tablet Commonly known as:  Ruthe Beam Take 1 Tab by mouth daily. loratadine 10 mg tablet Commonly known as:  Mata Lieu Take 1 Tab by mouth daily. LUMIGAN 0.03 % ophthalmic drops Generic drug:  bimatoprost  
Administer 1 Drop to both eyes every evening. MULTIVITAMIN PO Take  by mouth. Prescriptions Sent to Pharmacy Refills  
 loratadine (CLARITIN) 10 mg tablet 0 Sig: Take 1 Tab by mouth daily. Class: Normal  
 Pharmacy: [facility] [address] [place] [address] #: [phone] Route: Oral  
  
Patient Instructions Saline Nasal Washes: Care Instructions Your Care Instructions Saline nasal washes help keep the nasal passages open by washing out thick or dried mucus. This simple remedy can help relieve symptoms of allergies, sinusitis, and colds. It also can make the nose feel more comfortable by keeping the mucous membranes moist. You may notice a little burning sensation in your nose the first few times you use the solution, but this usually gets better in a few days. Follow-up care is a key part of your treatment and safety.  Be sure to make and go to all appointments, and call your doctor if you are having problems. It's also a good idea to know your test results and keep a list of the medicines you take. How can you care for yourself at home? · You can buy premixed saline solution in a squeeze bottle or other sinus rinse products at a drugstore. Read and follow the instructions on the label. · You also can make your own saline solution by adding 1 teaspoon of salt and 1 teaspoon of baking soda to 2 cups of distilled water. · If you use a homemade solution, pour a small amount into a clean bowl. Using a rubber bulb syringe, squeeze the syringe and place the tip in the salt water. Pull a small amount of the salt water into the syringe by relaxing your hand. · Sit down with your head tilted slightly back. Do not lie down. Put the tip of the bulb syringe or the squeeze bottle a little way into one of your nostrils. Gently drip or squirt a few drops into the nostril. Repeat with the other nostril. Some sneezing and gagging are normal at first. 
· Gently blow your nose. · Wipe the syringe or bottle tip clean after each use. · Repeat this 2 or 3 times a day. · Use nasal washes gently if you have nosebleeds often. When should you call for help? Watch closely for changes in your health, and be sure to contact your doctor if: 
? · You often get nosebleeds. ? · You have problems doing the nasal washes. Where can you learn more? Go to http://yue-noemy.info/. Enter [phone] [id number] [phone] in the search box to learn more about \"Saline Nasal Washes: Care Instructions. \" Current as of: May 12, 2017 Content Version: 11.4 © 9839-8601 [email or web address]. Care instructions adapted under license by [employer] (which disclaims liability or warranty for this information).  If you have questions about a medical condition or this instruction, always ask your healthcare professional. [employer] disclaims any warranty or liability for your use of this information. Fluticasone (Into the nose) Fluticasone (fqwl-OWM-n-sone) Treats allergy symptoms, such as runny or stuffy nose. This medicine is a corticosteroid. Brand Name(s): Children's Flonase, ClariSpray, DermacinRx [place], DermacinRx [place], [facility], Flonase Sensimist, Fluticasone Propionate Novaplus, Ticaspray, Veramyst  
There may be other brand names for this medicine. When This Medicine Should Not Be Used: This medicine is not right for everyone. Do not use if you had an allergic reaction to fluticasone. How to Use This Medicine:  
Spray · Your doctor will tell you how much medicine to use. Do not use more than directed. · This medicine is for use only in the nose. Do not get any of it in your eyes or on your skin. If it does get on these areas, rinse it off right away. · Prime the spray: Release 6 test sprays into the air away from the face, or pump the bottle until some of the medicine sprays out. Now it is ready to use. Prime the spray if it has not been used for more than 7 days (or 30 days for Veramyst®) or if the cap has been left off the bottle for 5 days or longer. · Shake the medicine well just before each use. · Before using the medicine, gently blow your nose to clear the nostrils. · After using the nasal spray, wipe the tip of the bottle with a clean tissue and put the cap back on. · You may need to use this medicine for a few days before you start to feel better. · Read and follow the patient instructions that come with this medicine. Talk to your doctor or pharmacist if you have any questions. · Follow the instructions on the medicine label if you are using this medicine without a prescription. · Missed dose: Take a dose as soon as you remember. If it is almost time for your next dose, wait until then and take a regular dose. Do not take extra medicine to make up for a missed dose. · Keep the bottle tightly closed when not using it. Store at room temperature, away from heat and direct light. Do not freeze or refrigerate. Throw this medicine away after you use 120 sprays. Drugs and Foods to Avoid: Ask your doctor or pharmacist before using any other medicine, including over-the-counter medicines, vitamins, and herbal products. · Do not use this medicine together with ritonavir. · Some foods and medicines can affect how fluticasone works. Tell your doctor if you are using ketoconazole. Warnings While Using This Medicine: · Tell your doctor if you are pregnant or breastfeeding, or if you have liver disease, asthma, an infection, or a history of cataracts or glaucoma. Make sure your doctor knows if you have had nose surgery, a nose injury, or a recent infection in your nose. · This medicine may cause the following problems: 
¨ Holes or ulcers inside the nose ¨ Slow wound healing ¨ Cataracts or glaucoma ¨ Problems with the adrenal glands ¨ Slow growth in children · Avoid people who are sick or have infections. Tell your doctor right away if you think you have been exposed to measles or chickenpox. · Your doctor will check your progress and the effects of this medicine at regular visits. Keep all appointments. · Call your doctor if your symptoms do not improve or if they get worse. · Keep all medicine out of the reach of children. Never share your medicine with anyone. Possible Side Effects While Using This Medicine:  
Call your doctor right away if you notice any of these side effects: · Allergic reaction: Itching or hives, swelling in your face or hands, swelling or tingling in your mouth or throat, chest tightness, trouble breathing · Burning, redness, swelling, or irritation around or inside your nose · Eye pain or vision changes · Fever, chills, cough, sore throat, and body aches · Heavy nosebleeds · Sores or white patches inside the nose or mouth · Tiredness, weakness, dizziness If you notice other side effects that you think are caused by this medicine, tell your doctor. Call your doctor for medical advice about side effects. You may report side effects to FDA at 7-389-FDA-0783 © 2017 [address] Information is for End User's use only and may not be sold, redistributed or otherwise used for commercial purposes. The above information is an  only. It is not intended as medical advice for individual conditions or treatments. Talk to your doctor, nurse or pharmacist before following any medical regimen to see if it is safe and effective for you. Loratadine (By mouth) Loratadine (mfx-C-ra-landry) Treats allergy symptoms and hives. Brand Name(s): Alavert, Allergy, Allergy Relief, Brite-Life Allergy Relief, Children's Claritin, Children's Claritin Allergy, Children's Claritin Chewables, Children's Loratadine, Children's Loratadine Allergy, Claritin, Claritin 24HR, Claritin Liqui-Gels, Claritin RediTabs, [facility] Loratadine, [facility] Children's Loratadine There may be other brand names for this medicine. When This Medicine Should Not Be Used: You should not use this medicine if you have had an allergic reaction to loratadine. Do not give any over-the-counter (OTC) cough and cold medicine to a baby or child under 3years old. Using these medicines in very young children might cause serious or possibly life-threatening side effects. How to Use This Medicine:  
Tablet, Dissolving Tablet, Liquid, Liquid Filled Capsule, Chewable Tablet · Your doctor will tell you how much of this medicine to use and how often. Do not use more medicine or use it more often than your doctor tells you to. · Follow the instructions on the medicine label if you are using this medicine without a prescription.  
· After you remove a rapidly disintegrating tablet (Reditab®) from the package, use it right away by putting the tablet on your tongue. The tablet will break up and dissolve quickly. You may take the tablet with or without water. · Measure the oral liquid medicine with a marked measuring spoon, oral syringe, or medicine cup. If a dose is missed: · If you miss a dose or forget to take your medicine, take it as soon as you can. If it is almost time for your next dose, wait until then to take the medicine and skip the missed dose. · Do not use extra medicine to make up for a missed dose. How to Store and Dispose of This Medicine: · Store the medicine at room temperature, away from heat and direct light. Do not freeze. · Ask your pharmacist, doctor, or health caregiver about the best way to dispose of any outdated medicine or medicine no longer needed. · Keep all medicine out of the reach of children and never share your medicine with anyone. Drugs and Foods to Avoid: Ask your doctor or pharmacist before using any other medicine, including over-the-counter medicines, vitamins, and herbal products. Warnings While Using This Medicine: · Make sure your doctor knows if you are pregnant or breastfeeding, or if you have liver disease or kidney disease. Possible Side Effects While Using This Medicine:  
Call your doctor right away if you notice any of these side effects: · Allergic reaction: Itching or hives, swelling in face or hands, swelling or tingling in the mouth or throat, tightness in chest, trouble breathing · Extreme weakness · Fast or irregular heartbeat · Uncontrolled movements of the head, neck, eyes or tongue If you notice these less serious side effects, talk with your doctor: · Cough, sore throat, hoarseness · Drowsiness · Dry mouth 
· Headache · Nervousness · Red, irritated eyes If you notice other side effects that you think are caused by this medicine, tell your doctor. Call your doctor for medical advice about side effects.  You may report side effects to FDA at 1-038-FDA-5403 © 2017 [facility] Information is for End User's use only and may not be sold, redistributed or otherwise used for commercial purposes. The above information is an  only. It is not intended as medical advice for individual conditions or treatments. Talk to your doctor, nurse or pharmacist before following any medical regimen to see if it is safe and effective for you. Introducing [facility]! [facility] introduces "[employer]" patient portal. Now you can access parts of your medical record, email your doctor's office, and request medication refills online. 1. In your internet browser, go to [email or web address] 2. Click on the First Time User? Click Here link in the Sign In box. You will see the New Member Sign Up page. 3. Enter your "[employer]" Access Code exactly as it appears below. You will not need to use this code after youve completed the sign-up process. If you do not sign up before the expiration date, you must request a new code. · "[employer] Access Code: [email or web address] Expires: 5/22/2018 11:00 AM 
 
4. Enter the last four digits of your Social Security Number (xxxx) and Date of Birth (mm/dd/yyyy) as indicated and click Submit. You will be taken to the next sign-up page. 5. Create a "[employer]" ID. This will be your "[employer]" login ID and cannot be changed, so think of one that is secure and easy to remember. 6. Create a "[employer]" password. You can change your password at any time. 7. Enter your Password Reset Question and Answer. This can be used at a later time if you forget your password. 8. Enter your e-mail address. You will receive e-mail notification when new information is available in [address] 9. Click Sign Up. You can now view and download portions of your medical record. 10. Click the Download Summary menu link to download a portable copy of your medical information. If you have questions, please visit the Frequently Asked Questions section of the [employer] website. Remember, [employer] is NOT to be used for urgent needs. For medical emergencies, dial 911. Now available from your iPhone and Android! Please provide this summary of care documentation to your next provider. Your primary care clinician is listed as Gwenetta Severs. Asaf Cid. If you have any questions after today's visit, please call [phone].

## 2018-03-28 NOTE — PATIENT INSTRUCTIONS
Saline Nasal Washes: Care Instructions  Your Care Instructions  Saline nasal washes help keep the nasal passages open by washing out thick or dried mucus. This simple remedy can help relieve symptoms of allergies, sinusitis, and colds. It also can make the nose feel more comfortable by keeping the mucous membranes moist. You may notice a little burning sensation in your nose the first few times you use the solution, but this usually gets better in a few days. Follow-up care is a key part of your treatment and safety. Be sure to make and go to all appointments, and call your doctor if you are having problems. It's also a good idea to know your test results and keep a list of the medicines you take. How can you care for yourself at home? · You can buy premixed saline solution in a squeeze bottle or other sinus rinse products at a drugstore. Read and follow the instructions on the label. · You also can make your own saline solution by adding 1 teaspoon of salt and 1 teaspoon of baking soda to 2 cups of distilled water. · If you use a homemade solution, pour a small amount into a clean bowl. Using a rubber bulb syringe, squeeze the syringe and place the tip in the salt water. Pull a small amount of the salt water into the syringe by relaxing your hand. · Sit down with your head tilted slightly back. Do not lie down. Put the tip of the bulb syringe or the squeeze bottle a little way into one of your nostrils. Gently drip or squirt a few drops into the nostril. Repeat with the other nostril. Some sneezing and gagging are normal at first.  · Gently blow your nose. · Wipe the syringe or bottle tip clean after each use. · Repeat this 2 or 3 times a day. · Use nasal washes gently if you have nosebleeds often. When should you call for help? Watch closely for changes in your health, and be sure to contact your doctor if:  ? · You often get nosebleeds. ? · You have problems doing the nasal washes.    Where can you learn more? Go to http://yue-noemy.info/. Enter [id number] [id number] in the search box to learn more about \"Saline Nasal Washes: Care Instructions. \"  Current as of: May 12, 2017  Content Version: 11.4  © 8328-7418 [address]. Care instructions adapted under license by [employer] (which disclaims liability or warranty for this information). If you have questions about a medical condition or this instruction, always ask your healthcare professional. [address] any warranty or liability for your use of this information. Fluticasone (Into the nose)   Fluticasone (abha-GVT-v-sone)  Treats allergy symptoms, such as runny or stuffy nose. This medicine is a corticosteroid. Brand Name(s): Children's Flonase, ClariSpray, DermacinRx [employer], DermacinRx [place], [address] [place] [address], Flonase Sensimist, Fluticasone Propionate Novaplus, Ticaspray, Veramyst   There may be other brand names for this medicine. When This Medicine Should Not Be Used: This medicine is not right for everyone. Do not use if you had an allergic reaction to fluticasone. How to Use This Medicine:   Spray  · Your doctor will tell you how much medicine to use. Do not use more than directed. · This medicine is for use only in the nose. Do not get any of it in your eyes or on your skin. If it does get on these areas, rinse it off right away. · Prime the spray: Release 6 test sprays into the air away from the face, or pump the bottle until some of the medicine sprays out. Now it is ready to use. Prime the spray if it has not been used for more than 7 days (or 30 days for Veramyst®) or if the cap has been left off the bottle for 5 days or longer. · Shake the medicine well just before each use. · Before using the medicine, gently blow your nose to clear the nostrils. · After using the nasal spray, wipe the tip of the bottle with a clean tissue and put the cap back on.   · You may need to use this medicine for a few days before you start to feel better. · Read and follow the patient instructions that come with this medicine. Talk to your doctor or pharmacist if you have any questions. · Follow the instructions on the medicine label if you are using this medicine without a prescription. · Missed dose: Take a dose as soon as you remember. If it is almost time for your next dose, wait until then and take a regular dose. Do not take extra medicine to make up for a missed dose. · Keep the bottle tightly closed when not using it. Store at room temperature, away from heat and direct light. Do not freeze or refrigerate. Throw this medicine away after you use 120 sprays. Drugs and Foods to Avoid:   Ask your doctor or pharmacist before using any other medicine, including over-the-counter medicines, vitamins, and herbal products. · Do not use this medicine together with ritonavir. · Some foods and medicines can affect how fluticasone works. Tell your doctor if you are using ketoconazole. Warnings While Using This Medicine:   · Tell your doctor if you are pregnant or breastfeeding, or if you have liver disease, asthma, an infection, or a history of cataracts or glaucoma. Make sure your doctor knows if you have had nose surgery, a nose injury, or a recent infection in your nose. · This medicine may cause the following problems:  ¨ Holes or ulcers inside the nose  ¨ Slow wound healing  ¨ Cataracts or glaucoma  ¨ Problems with the adrenal glands  ¨ Slow growth in children  · Avoid people who are sick or have infections. Tell your doctor right away if you think you have been exposed to measles or chickenpox. · Your doctor will check your progress and the effects of this medicine at regular visits. Keep all appointments. · Call your doctor if your symptoms do not improve or if they get worse. · Keep all medicine out of the reach of children. Never share your medicine with anyone.   Possible Side Effects While Using This Medicine:   Call your doctor right away if you notice any of these side effects:  · Allergic reaction: Itching or hives, swelling in your face or hands, swelling or tingling in your mouth or throat, chest tightness, trouble breathing  · Burning, redness, swelling, or irritation around or inside your nose  · Eye pain or vision changes  · Fever, chills, cough, sore throat, and body aches  · Heavy nosebleeds  · Sores or white patches inside the nose or mouth  · Tiredness, weakness, dizziness  If you notice other side effects that you think are caused by this medicine, tell your doctor. Call your doctor for medical advice about side effects. You may report side effects to FDA at 9-495-FDA-5989  © 2017 [address]  Information is for End User's use only and may not be sold, redistributed or otherwise used for commercial purposes. The above information is an  only. It is not intended as medical advice for individual conditions or treatments. Talk to your doctor, nurse or pharmacist before following any medical regimen to see if it is safe and effective for you. Loratadine (By mouth)   Loratadine (fzt-T-ze-landry)  Treats allergy symptoms and hives. Brand Name(s): Alavert, Allergy, Allergy Relief, Brite-Life Allergy Relief, Children's Claritin, Children's Claritin Allergy, Children's Claritin Chewables, Children's Loratadine, Children's Loratadine Allergy, Claritin, Claritin 24HR, Claritin Liqui-Gels, Claritin RediTabs, [facility] Loratadine, [facility] Children's Loratadine   There may be other brand names for this medicine. When This Medicine Should Not Be Used: You should not use this medicine if you have had an allergic reaction to loratadine. Do not give any over-the-counter (OTC) cough and cold medicine to a baby or child under 3years old. Using these medicines in very young children might cause serious or possibly life-threatening side effects.   How to Use This Medicine:   Tablet, Dissolving Tablet, Liquid, Liquid Filled Capsule, Chewable Tablet  · Your doctor will tell you how much of this medicine to use and how often. Do not use more medicine or use it more often than your doctor tells you to. · Follow the instructions on the medicine label if you are using this medicine without a prescription. · After you remove a rapidly disintegrating tablet (Reditab®) from the package, use it right away by putting the tablet on your tongue. The tablet will break up and dissolve quickly. You may take the tablet with or without water. · Measure the oral liquid medicine with a marked measuring spoon, oral syringe, or medicine cup. If a dose is missed:   · If you miss a dose or forget to take your medicine, take it as soon as you can. If it is almost time for your next dose, wait until then to take the medicine and skip the missed dose. · Do not use extra medicine to make up for a missed dose. How to Store and Dispose of This Medicine:   · Store the medicine at room temperature, away from heat and direct light. Do not freeze. · Ask your pharmacist, doctor, or health caregiver about the best way to dispose of any outdated medicine or medicine no longer needed. · Keep all medicine out of the reach of children and never share your medicine with anyone. Drugs and Foods to Avoid:      Ask your doctor or pharmacist before using any other medicine, including over-the-counter medicines, vitamins, and herbal products. Warnings While Using This Medicine:   · Make sure your doctor knows if you are pregnant or breastfeeding, or if you have liver disease or kidney disease.   Possible Side Effects While Using This Medicine:   Call your doctor right away if you notice any of these side effects:  · Allergic reaction: Itching or hives, swelling in face or hands, swelling or tingling in the mouth or throat, tightness in chest, trouble breathing  · Extreme weakness  · Fast or irregular heartbeat  · Uncontrolled movements of the head, neck, eyes or tongue  If you notice these less serious side effects, talk with your doctor:   · Cough, sore throat, hoarseness  · Drowsiness  · Dry mouth  · Headache  · Nervousness  · Red, irritated eyes  If you notice other side effects that you think are caused by this medicine, tell your doctor. Call your doctor for medical advice about side effects. You may report side effects to FDA at 7-195-FDA-3453  © 2017 [address]  Information is for End User's use only and may not be sold, redistributed or otherwise used for commercial purposes. The above information is an  only. It is not intended as medical advice for individual conditions or treatments. Talk to your doctor, nurse or pharmacist before following any medical regimen to see if it is safe and effective for you.

## 2018-05-09 ENCOUNTER — APPOINTMENT (OUTPATIENT)
Dept: INTERNAL MEDICINE CLINIC | Age: 78
End: 2018-05-09

## 2018-05-09 ENCOUNTER — HOSPITAL ENCOUNTER (OUTPATIENT)
Dept: LAB | Age: 78
Discharge: HOME | End: 2018-05-09
Payer: MEDICARE

## 2018-05-09 LAB
EST. AVERAGE GLUCOSE BLD GHB EST-MCNC: 131 MG/DL
GLUCOSE SERPL-MCNC: 94 MG/DL (ref 74–99)
HBA1C MFR BLD: 6.2 % (ref 4.2–5.6)

## 2018-05-09 PROCEDURE — 36415 COLL VENOUS BLD VENIPUNCTURE: CPT | Performed by: INTERNAL MEDICINE

## 2018-05-09 PROCEDURE — 83036 HEMOGLOBIN GLYCOSYLATED A1C: CPT | Performed by: INTERNAL MEDICINE

## 2018-05-09 PROCEDURE — 82947 ASSAY GLUCOSE BLOOD QUANT: CPT | Performed by: INTERNAL MEDICINE

## 2018-05-14 DIAGNOSIS — R73.01 IMPAIRED FASTING GLUCOSE: ICD-10-CM

## 2018-05-24 ENCOUNTER — TELEPHONE (OUTPATIENT)
Dept: INTERNAL MEDICINE CLINIC | Age: 78
End: 2018-05-24

## 2018-05-24 ENCOUNTER — OFFICE VISIT (OUTPATIENT)
Dept: INTERNAL MEDICINE CLINIC | Age: 78
End: 2018-05-24

## 2018-05-24 VITALS
WEIGHT: 123.2 LBS | OXYGEN SATURATION: 98 % | BODY MASS INDEX: 22.67 KG/M2 | TEMPERATURE: 98.2 F | HEIGHT: 62 IN | SYSTOLIC BLOOD PRESSURE: 138 MMHG | RESPIRATION RATE: 14 BRPM | DIASTOLIC BLOOD PRESSURE: 76 MMHG | HEART RATE: 56 BPM

## 2018-05-24 DIAGNOSIS — I10 ESSENTIAL HYPERTENSION: ICD-10-CM

## 2018-05-24 DIAGNOSIS — E78.5 HYPERLIPIDEMIA LDL GOAL <130: ICD-10-CM

## 2018-05-24 DIAGNOSIS — R73.01 IMPAIRED FASTING GLUCOSE: ICD-10-CM

## 2018-05-24 DIAGNOSIS — M54.32 SCIATICA OF LEFT SIDE: Primary | ICD-10-CM

## 2018-05-24 RX ORDER — DEXAMETHASONE 4 MG/1
TABLET ORAL
Qty: 20 TAB | Refills: 0 | Status: SHIPPED | OUTPATIENT
Start: 2018-05-24 | End: 2019-06-06 | Stop reason: SDUPTHER

## 2018-05-24 RX ORDER — HYDROCODONE BITARTRATE AND ACETAMINOPHEN 7.5; 325 MG/1; MG/1
TABLET ORAL
Qty: 60 TAB | Refills: 0 | Status: SHIPPED | OUTPATIENT
Start: 2018-05-24 | End: 2019-06-06 | Stop reason: SDUPTHER

## 2018-05-24 NOTE — PROGRESS NOTES
1. Have you been to the ER, urgent care clinic or hospitalized since your last visit? NO.     2. Have you seen or consulted any other health care providers outside of the [address] [place] [address] since your last visit (Include any pap smears or colon screening)? NO      Do you have an Advanced Directive? NO    Would you like information on Advanced Directives?  NO

## 2018-05-24 NOTE — MR AVS SNAPSHOT
[address] [facility] 
 
 
 [address], [address] [facility] 
[phone] Patient: Jane Ramirez MRN: [id number] KNE:[id number] Visit Information Date & Time Provider Department Dept. Phone Encounter #  
 5/24/2018 10:00 AM Franck Herron MD [facility] [phone] Your Appointments 11/12/2018  9:05 AM  
LAB with [facility] NURSE VISIT [facility] ([facility]) Appt Note: lab  
 [address], [address] [place] [address] [address] [place]  
  
   
 [address], [address]  
  
    
 11/19/2018 10:30 AM  
PHYSICAL with Franck Herron MD  
[facility]) Appt Note: rpe  
 [address] [facility], [address] [place]  
  
   
 [address], [address] Upcoming Health Maintenance Date Due Influenza Age 5 to Adult 8/1/2018 MEDICARE YEARLY EXAM 11/15/2018 GLAUCOMA SCREENING Q2Y 11/9/2019 COLONOSCOPY 11/13/2025 DTaP/Tdap/Td series (2 - Td) 11/14/2027 Allergies as of 5/24/2018  Review Complete On: 5/24/2018 By: Franck Herron MD  
 No Known Allergies Current Immunizations  Reviewed on 11/14/2017 Name Date H1N1 FLU VACCINE 12/17/2009 Influenza High Dose Vaccine PF 11/14/2017 11:46 AM, 11/14/2016, 11/11/2015 Influenza Vaccine 10/20/2014  2:55 PM, 10/4/2013 12:06 PM  
 Influenza Vaccine Split 10/23/2012 11:50 AM, 10/5/2011 Influenza Vaccine Whole 10/15/2010 Pneumococcal Conjugate (PCV-13) 2/21/2018 11:06 AM  
 ZZZ-RETIRED (DO NOT USE) Pneumococcal Vaccine (Unspecified Type) 5/23/2012 Not reviewed this visit You Were Diagnosed With   
  
 Codes Comments Sciatica of left side    -  Primary ICD-10-CM: M54.32 
ICD-9-CM: 724.3 Vitals BP Pulse Temp Resp Height(growth percentile) Weight(growth percentile) 138/76 (BP 1 Location: Right arm, BP Patient Position: Sitting) (!) 56 98.2 °F (36.8 °C) (Oral) 14 5' 1.5\" (1.562 m) 123 lb 3.2 oz (55.9 kg) SpO2 BMI OB Status Smoking Status 98% 22.9 kg/m2 Hysterectomy Never Smoker Vitals History BMI and BSA Data Body Mass Index Body Surface Area  
 22.9 kg/m 2 1.56 m 2 Preferred Pharmacy Pharmacy Name Phone [facility] [address],[facility], [address] [address] [phone] Your Updated Medication List  
  
   
This list is accurate as of 18 10:35 AM.  Always use your most recent med list.  
  
  
  
  
 CALCIUM WITH VITAMIN D PO Take  by mouth. dexamethasone 4 mg tablet Commonly known as:  DECADRON  
1 tablet by mouth twice a day DORZOLAMIDE-TIMOLOL OP Apply  to eye.  
  
 estradiol 0.1 mg/24 hr  
Commonly known as:  CLIMARA APPLY 1 PATCH EVERY 7 DAYS HYDROcodone-acetaminophen 7.5-325 mg per tablet Commonly known as:  NORCO  
1 tablet every 6 hours as needed for sciatica pain  
  
 lisinopril-hydroCHLOROthiazide 20-25 mg per tablet Commonly known as:  Nikkie Rattler Take 1 Tab by mouth daily. LUMIGAN 0.03 % ophthalmic drops Generic drug:  bimatoprost  
Administer 1 Drop to both eyes every evening. Prescriptions Printed Refills  
 dexamethasone (DECADRON) 4 mg tablet 0 Si tablet by mouth twice a day Class: Print HYDROcodone-acetaminophen (NORCO) 7.5-325 mg per tablet 0 Si tablet every 6 hours as needed for sciatica pain  
 Class: Print Introducing [facility]! Vaishali Paz introduces SABIA patient portal. Now you can access parts of your medical record, email your doctor's office, and request medication refills online. 1. In your internet browser, go to [email or web address] 2. Click on the First Time User? Click Here link in the Sign In box. You will see the New Member Sign Up page. 3. Enter your [employer] Access Code exactly as it appears below. You will not need to use this code after youve completed the sign-up process. If you do not sign up before the expiration date, you must request a new code. · [employer] Access Code: [email or web address] Expires: 8/22/2018  9:45 AM 
 
4. Enter the last four digits of your Social Security Number (xxxx) and Date of Birth (mm/dd/yyyy) as indicated and click Submit. You will be taken to the next sign-up page. 5. Create a [employer] ID. This will be your [employer] login ID and cannot be changed, so think of one that is secure and easy to remember. 6. Create a [employer] password. You can change your password at any time. 7. Enter your Password Reset Question and Answer. This can be used at a later time if you forget your password. 8. Enter your e-mail address. You will receive e-mail notification when new information is available in [address] 9. Click Sign Up. You can now view and download portions of your medical record. 10. Click the Download Summary menu link to download a portable copy of your medical information. If you have questions, please visit the Frequently Asked Questions section of the [employer] website. Remember, [employer] is NOT to be used for urgent needs. For medical emergencies, dial 911. Now available from your iPhone and Android! Please provide this summary of care documentation to your next provider. Your primary care clinician is listed as Jeffrey Lopez. Debby Su. If you have any questions after today's visit, please call [phone].

## 2018-05-24 NOTE — PROGRESS NOTES
Jemal Mac 1940, is a 69 y.o. female, who is seen today for reevaluation of hypertension impaired fasting glucose hyperlipidemia history of sciatica. She had previously had sciatica, the very first time it occurred it was quite severe when she was in [place] and she was treated without steroids and suffered quite some period of time. That sciatica was on the right but over the last 6 weeks she is developed pain all across her low back and radiating into the left leg mainly in the thigh but sometimes into the calf. She has been using Tylenol at bedtime. The pain is worse when she first gets up and walks around to get somewhat better as she continues to walk. She has not used any nonsteroidal drugs. She has not used hydrocodone. There is no weakness in the leg. She takes her blood pressure medicine regularly and eats a healthy diet. Her weight is stable. She eats a diet free of sweets but does enjoy pasta. She will be leaving for [place] in July for her annual summer trip there to visit family. Past Medical History:   Diagnosis Date    Diverticulosis of sigmoid colon 11/13/15    mild; Dr. Alyssa Kaye Eczema     Elevated cholesterol     Family history of diabetes mellitus (DM)     Glaucoma     Hyperlipidemia     Hypertension     Indigestion     Interpolated PVCs      Current Outpatient Prescriptions   Medication Sig Dispense Refill    dexamethasone (DECADRON) 4 mg tablet 1 tablet by mouth twice a day 20 Tab 0    HYDROcodone-acetaminophen (NORCO) 7.5-325 mg per tablet 1 tablet every 6 hours as needed for sciatica pain 60 Tab 0    estradiol (CLIMARA) 0.1 mg/24 hr APPLY 1 PATCH EVERY 7 DAYS 12 Patch 3    lisinopril-hydroCHLOROthiazide (PRINZIDE, ZESTORETIC) 20-25 mg per tablet Take 1 Tab by mouth daily. 90 Tab 3    bimatoprost (LUMIGAN) 0.03 % ophthalmic drops Administer 1 Drop to both eyes every evening.  DORZOLAMIDE HCL/TIMOLOL MALEAT (DORZOLAMIDE-TIMOLOL OP) Apply  to eye.  CALCIUM CARBONATE/VITAMIN D3 (CALCIUM WITH VITAMIN D PO) Take  by mouth. Visit Vitals    /76 (BP 1 Location: Right arm, BP Patient Position: Sitting)    Pulse (!) 56    Temp 98.2 °F (36.8 °C) (Oral)    Resp 14    Ht 5' 1.5\" (1.562 m)    Wt 123 lb 3.2 oz (55.9 kg)    SpO2 98%    BMI 22.9 kg/m2     Carotids are 2+ without bruits. Lungs are clear to percussion. Good breath sounds with no wheezing or crackles. Heart reveals a regular rhythm with normal S1-S2 no murmur gallop click or rub. Abdomen is soft and nontender with no hepatosplenomegaly or masses and no bruits. Extremities reveal no clubbing cyanosis or edema. Pulses are 2+. Normal straight leg raising. Good range of motion of the left hip. No pain. There is no tenderness in the back. No rash anywhere. She does have a couple of seborrheic keratosis on her left breast and multiple other similar keratoses elsewhere on her trunk. Results for orders placed or performed during the hospital encounter of 05/09/18   GLUCOSE, RANDOM   Result Value Ref Range    Glucose 94 74 - 99 mg/dL   HEMOGLOBIN A1C WITH EAG   Result Value Ref Range    Hemoglobin A1c 6.2 (H) 4.2 - 5.6 %    Est. average glucose 131 mg/dL       Assessment: #1. Hypertension is well controlled. She will continue Prinzide 2025, 1 daily. #2. Hyperlipidemia doing fairly well as of 6 months ago. She will continue low-fat diet and avoid significant weight gain. #3.  Impaired fasting glucose with A1c of 6.2 and normal fasting glucose currently. She will continue diabetic diet. #4.  Sciatic symptoms this time on the left were previously she has had fairly major pain related to sciatica on the right. I recommended she use dexamethasone 4 mg twice a day for just 7 days and see if that will improve symptoms at least for a while. She will let me know if she is not doing well prior to her trip to [facility].   I did also give her a fresh supply of dexamethasone to be used up to 10 days and hydrocodone should this be necessary while in [facility] since she has few options for treating her sciatica there other than the emergency room or hospital.    Follow-up 6 months for complete evaluation    He Shields MD FACP    Please note: This document has been produced using voice recognition software. Unrecognized errors in transcription may be present.

## 2018-06-10 RX ORDER — LISINOPRIL AND HYDROCHLOROTHIAZIDE 20; 25 MG/1; MG/1
TABLET ORAL
Qty: 90 TAB | Refills: 1 | Status: SHIPPED | OUTPATIENT
Start: 2018-06-10 | End: 2018-12-31 | Stop reason: SDUPTHER

## 2018-11-27 ENCOUNTER — APPOINTMENT (OUTPATIENT)
Dept: INTERNAL MEDICINE CLINIC | Age: 78
End: 2018-11-27

## 2018-11-27 ENCOUNTER — HOSPITAL ENCOUNTER (OUTPATIENT)
Dept: LAB | Age: 78
Discharge: HOME | End: 2018-11-27
Payer: MEDICARE

## 2018-11-27 DIAGNOSIS — E78.5 HYPERLIPIDEMIA LDL GOAL <130: ICD-10-CM

## 2018-11-27 DIAGNOSIS — I10 ESSENTIAL HYPERTENSION: ICD-10-CM

## 2018-11-27 DIAGNOSIS — R73.01 IMPAIRED FASTING GLUCOSE: ICD-10-CM

## 2018-11-27 LAB
ALBUMIN SERPL-MCNC: 3.8 G/DL (ref 3.4–5)
ALBUMIN/GLOB SERPL: 1.3 {RATIO} (ref 0.8–1.7)
ALP SERPL-CCNC: 45 U/L (ref 45–117)
ALT SERPL-CCNC: 19 U/L (ref 13–56)
ANION GAP SERPL CALC-SCNC: 7 MMOL/L (ref 3–18)
AST SERPL-CCNC: 14 U/L (ref 15–37)
BASOPHILS # BLD: 0.1 K/UL (ref 0–0.1)
BASOPHILS NFR BLD: 1 % (ref 0–2)
BILIRUB SERPL-MCNC: 0.5 MG/DL (ref 0.2–1)
BUN SERPL-MCNC: 11 MG/DL (ref 7–18)
BUN/CREAT SERPL: 18 (ref 12–20)
CALCIUM SERPL-MCNC: 8.9 MG/DL (ref 8.5–10.1)
CHLORIDE SERPL-SCNC: 102 MMOL/L (ref 100–108)
CHOLEST SERPL-MCNC: 258 MG/DL
CO2 SERPL-SCNC: 28 MMOL/L (ref 21–32)
CREAT SERPL-MCNC: 0.6 MG/DL (ref 0.6–1.3)
DIFFERENTIAL METHOD BLD: ABNORMAL
EOSINOPHIL # BLD: 0.2 K/UL (ref 0–0.4)
EOSINOPHIL NFR BLD: 4 % (ref 0–5)
ERYTHROCYTE [DISTWIDTH] IN BLOOD BY AUTOMATED COUNT: 13.6 % (ref 11.6–14.5)
EST. AVERAGE GLUCOSE BLD GHB EST-MCNC: 140 MG/DL
GLOBULIN SER CALC-MCNC: 2.9 G/DL (ref 2–4)
GLUCOSE SERPL-MCNC: 111 MG/DL (ref 74–99)
HBA1C MFR BLD: 6.5 % (ref 4.2–5.6)
HCT VFR BLD AUTO: 38.7 % (ref 35–45)
HDLC SERPL-MCNC: 60 MG/DL (ref 40–60)
HDLC SERPL: 4.3 {RATIO} (ref 0–5)
HGB BLD-MCNC: 12.6 G/DL (ref 12–16)
LDLC SERPL CALC-MCNC: 173.4 MG/DL (ref 0–100)
LIPID PROFILE,FLP: ABNORMAL
LYMPHOCYTES # BLD: 2.2 K/UL (ref 0.9–3.6)
LYMPHOCYTES NFR BLD: 33 % (ref 21–52)
MCH RBC QN AUTO: 30.1 PG (ref 24–34)
MCHC RBC AUTO-ENTMCNC: 32.6 G/DL (ref 31–37)
MCV RBC AUTO: 92.6 FL (ref 74–97)
MONOCYTES # BLD: 0.7 K/UL (ref 0.05–1.2)
MONOCYTES NFR BLD: 11 % (ref 3–10)
NEUTS SEG # BLD: 3.5 K/UL (ref 1.8–8)
NEUTS SEG NFR BLD: 51 % (ref 40–73)
PLATELET # BLD AUTO: 282 K/UL (ref 135–420)
PMV BLD AUTO: 10.8 FL (ref 9.2–11.8)
POTASSIUM SERPL-SCNC: 3.8 MMOL/L (ref 3.5–5.5)
PROT SERPL-MCNC: 6.7 G/DL (ref 6.4–8.2)
RBC # BLD AUTO: 4.18 M/UL (ref 4.2–5.3)
SODIUM SERPL-SCNC: 137 MMOL/L (ref 136–145)
TRIGL SERPL-MCNC: 123 MG/DL (ref ?–150)
VLDLC SERPL CALC-MCNC: 24.6 MG/DL
WBC # BLD AUTO: 6.7 K/UL (ref 4.6–13.2)

## 2018-11-27 PROCEDURE — 85025 COMPLETE CBC W/AUTO DIFF WBC: CPT

## 2018-11-27 PROCEDURE — 36415 COLL VENOUS BLD VENIPUNCTURE: CPT

## 2018-11-27 PROCEDURE — 83036 HEMOGLOBIN GLYCOSYLATED A1C: CPT

## 2018-11-27 PROCEDURE — 80053 COMPREHEN METABOLIC PANEL: CPT

## 2018-11-27 PROCEDURE — 80061 LIPID PANEL: CPT

## 2018-12-04 ENCOUNTER — OFFICE VISIT (OUTPATIENT)
Dept: INTERNAL MEDICINE CLINIC | Age: 78
End: 2018-12-04

## 2018-12-04 VITALS
BODY MASS INDEX: 22.84 KG/M2 | TEMPERATURE: 97.9 F | OXYGEN SATURATION: 98 % | SYSTOLIC BLOOD PRESSURE: 140 MMHG | WEIGHT: 121 LBS | RESPIRATION RATE: 16 BRPM | DIASTOLIC BLOOD PRESSURE: 82 MMHG | HEIGHT: 61 IN | HEART RATE: 58 BPM

## 2018-12-04 DIAGNOSIS — E78.5 HYPERLIPIDEMIA LDL GOAL <130: ICD-10-CM

## 2018-12-04 DIAGNOSIS — Z23 ENCOUNTER FOR IMMUNIZATION: ICD-10-CM

## 2018-12-04 DIAGNOSIS — M15.9 GENERALIZED OSTEOARTHROSIS, INVOLVING MULTIPLE SITES: ICD-10-CM

## 2018-12-04 DIAGNOSIS — Z00.00 MEDICARE ANNUAL WELLNESS VISIT, SUBSEQUENT: ICD-10-CM

## 2018-12-04 DIAGNOSIS — R73.01 IMPAIRED FASTING GLUCOSE: ICD-10-CM

## 2018-12-04 DIAGNOSIS — I10 ESSENTIAL HYPERTENSION: Primary | ICD-10-CM

## 2018-12-04 DIAGNOSIS — Z13.31 SCREENING FOR DEPRESSION: ICD-10-CM

## 2018-12-04 NOTE — PROGRESS NOTES
1. Have you been to the ER, urgent care clinic or hospitalized since your last visit? NO 
      
 
2. Have you seen or consulted any other health care providers outside of the [address] [place] [address] since your last visit (Include any pap smears or colon screening)? YES Do you have an Advanced Directive? YES Would you like information on Advanced Directives?  NO

## 2018-12-04 NOTE — PROGRESS NOTES
Johana Walsh 1940, is a 69 y.o. female, who is seen today for reevaluation of hypertension, hyperlipidemia, impaired fasting glucose, history of sciatica. She notes that in recent weeks she has had some discomfort in the right low back sometimes radiating into the lateral hip and thigh area but not below the knee for quite some time. It gets better after she walks around a little bit. Sometimes in the evening if it hurts she uses 1 Tylenol and rarely 2 Tylenols. She also notes that over the last couple of weeks she has had some intermittent slight discomfort in the upper right arm, not in the shoulder or elbow. Tylenol relieves that as well. She takes her medication regularly. She eats a healthy diet except she ate more and drink more wine when she was in [facility], having returned in September after spending 3 months there with family. She also notes that she has had irritated eyes lately and was diagnosed yesterday with dry eye by her ophthalmologist and she was given refresh eyedrops and a coupon for more of this over-the-counter medicine. Past Medical History:  
Diagnosis Date  Diverticulosis of sigmoid colon 11/13/15  
 mild; Dr. Jaquan Hameed  Eczema  Elevated cholesterol  Family history of diabetes mellitus (DM)  Glaucoma  Hyperlipidemia  Hypertension  Indigestion  Interpolated PVCs Past Surgical History:  
Procedure Laterality Date  HX BREAST BIOPSY [address] STEPHAN/BSO  HX OOPHORECTOMY    
 right Current Outpatient Medications Medication Sig Dispense Refill  lisinopril-hydroCHLOROthiazide (PRINZIDE, ZESTORETIC) 20-25 mg per tablet TAKE 1 TABLET BY MOUTH  DAILY 90 Tab 1  
 estradiol (CLIMARA) 0.1 mg/24 hr APPLY 1 PATCH EVERY 7 DAYS 12 Patch 3  
 bimatoprost (LUMIGAN) 0.03 % ophthalmic drops Administer 1 Drop to both eyes every evening.  DORZOLAMIDE HCL/TIMOLOL MALEAT (DORZOLAMIDE-TIMOLOL OP) Apply  to eye.  CALCIUM CARBONATE/VITAMIN D3 (CALCIUM WITH VITAMIN D PO) Take  by mouth.  dexamethasone (DECADRON) 4 mg tablet 1 tablet by mouth twice a day 20 Tab 0  
 HYDROcodone-acetaminophen (NORCO) 7.5-325 mg per tablet 1 tablet every 6 hours as needed for sciatica pain 60 Tab 0 No Known Allergies Social History Socioeconomic History  Marital status:  Spouse name: Not on file  Number of children: Not on file  Years of education: Not on file  Highest education level: Not on file Tobacco Use  Smoking status: Never Smoker  Smokeless tobacco: Never Used Substance and Sexual Activity  Alcohol use: No  
  Comment: with dinner  Drug use: No  
 Sexual activity: Not Currently Visit Vitals /82 (BP 1 Location: Right arm, BP Patient Position: Sitting) Pulse (!) 58 Temp 97.9 °F (36.6 °C) (Oral) Resp 16 Ht 5' 1\" (1.549 m) Wt 121 lb (54.9 kg) SpO2 98% BMI 22.86 kg/m² The patient is a well-developed well-nourished female in no apparent distress. HEENT: Pupils are equal and react to light and extraocular movements are full. Ear canals and tympanic membranes appear normal. Oral cavity appears normal with no oral lesions. Neck: Carotids are 2+ without bruits. No adenopathy or thyromegaly. Lungs are clear to percussion. I hear no wheezing, rales or rhonchi. Heart reveals a regular rhythm with no murmur, gallop, click or rub. The apical impulse is in the fifth interspace at the midclavicular line. Abdomen is soft and nontender with no hepatosplenomegaly or masses. Bowel sounds are normoactive and there is no distention or tympany. Extremities reveal no clubbing cyanosis or edema. Pulses are 2+. There is no tenderness of the back, no tenderness over the right hip bursa or the rest of the thigh and no tenderness in the right arm. Good range of motion of the shoulder and elbow without pain. Skin reveals no suspicious skin growths. Breasts reveal no masses, skin or nipple abnormalities. No axillary adenopathy. Results for orders placed or performed during the hospital encounter of 11/27/18 CBC WITH AUTOMATED DIFF Result Value Ref Range WBC 6.7 4.6 - 13.2 K/uL  
 RBC 4.18 (L) 4.20 - 5.30 M/uL  
 HGB 12.6 12.0 - 16.0 g/dL HCT 38.7 35.0 - 45.0 % MCV 92.6 74.0 - 97.0 FL  
 MCH 30.1 24.0 - 34.0 PG  
 MCHC 32.6 31.0 - 37.0 g/dL  
 RDW 13.6 11.6 - 14.5 % PLATELET 258 160 - 965 K/uL MPV 10.8 9.2 - 11.8 FL  
 NEUTROPHILS 51 40 - 73 % LYMPHOCYTES 33 21 - 52 % MONOCYTES 11 (H) 3 - 10 % EOSINOPHILS 4 0 - 5 % BASOPHILS 1 0 - 2 %  
 ABS. NEUTROPHILS 3.5 1.8 - 8.0 K/UL  
 ABS. LYMPHOCYTES 2.2 0.9 - 3.6 K/UL  
 ABS. MONOCYTES 0.7 0.05 - 1.2 K/UL  
 ABS. EOSINOPHILS 0.2 0.0 - 0.4 K/UL  
 ABS. BASOPHILS 0.1 0.0 - 0.1 K/UL  
 DF AUTOMATED    
LIPID PANEL Result Value Ref Range LIPID PROFILE Cholesterol, total 258 (H) <200 MG/DL Triglyceride 123 <150 MG/DL  
 HDL Cholesterol 60 40 - 60 MG/DL  
 LDL, calculated 173.4 (H) 0 - 100 MG/DL VLDL, calculated 24.6 MG/DL  
 CHOL/HDL Ratio 4.3 0 - 5.0 METABOLIC PANEL, COMPREHENSIVE Result Value Ref Range Sodium 137 136 - 145 mmol/L Potassium 3.8 3.5 - 5.5 mmol/L Chloride 102 100 - 108 mmol/L  
 CO2 28 21 - 32 mmol/L Anion gap 7 3.0 - 18 mmol/L Glucose 111 (H) 74 - 99 mg/dL BUN 11 7.0 - 18 MG/DL Creatinine 0.60 0.6 - 1.3 MG/DL  
 BUN/Creatinine ratio 18 12 - 20 GFR est AA >60 >60 ml/min/1.73m2 GFR est non-AA >60 >60 ml/min/1.73m2 Calcium 8.9 8.5 - 10.1 MG/DL Bilirubin, total 0.5 0.2 - 1.0 MG/DL  
 ALT (SGPT) 19 13 - 56 U/L  
 AST (SGOT) 14 (L) 15 - 37 U/L Alk. phosphatase 45 45 - 117 U/L Protein, total 6.7 6.4 - 8.2 g/dL Albumin 3.8 3.4 - 5.0 g/dL Globulin 2.9 2.0 - 4.0 g/dL A-G Ratio 1.3 0.8 - 1.7 HEMOGLOBIN A1C WITH EAG Result Value Ref Range Hemoglobin A1c 6.5 (H) 4.2 - 5.6 % Est. average glucose 140 mg/dL Assessment: #1. Hypertension is well controlled. She will continue lisinopril with hydrochlorothiazide, 2025, 1 daily. #2.  Impaired fasting glucose with glucose and A1c higher than it has been over the last year. She will continue very healthy diet, continue to avoid sweets and she will continue to avoid excessive amounts of wine. #3. Hyperlipidemia with cholesterol higher than previously, she is going to continue very healthy diet and maintain a normal weight. #4.  History of rather severe sciatica but that has not recurred for about 3 years, she has dexamethasone and Norco at home in case it does but otherwise will depend on Tylenol and if Tylenol is not working very well for her back pain I recommended she use Aleve up to 2 tablets twice a day. #5.  Upper arm pain on the right intermittently lately. She will use Tylenol or use Aleve if needed. If this continues to get worse she will let me know. #6.  Dry eye lately, she will use refresh as directed by Dr. Dayo Oconnell yesterday. She was given a sample and a coupon for this over-the-counter medication. She will get a flu shot now. She had a Medicare wellness evaluation this morning and I agree with Hebert's note. Follow-up 6 months He Villarreal, [address] Please note: This document has been produced using voice recognition software. Unrecognized errors in transcription may be present.

## 2018-12-04 NOTE — PATIENT INSTRUCTIONS
Medicare Part B Preventive Services Limitations Recommendation Follow-up Action Needed Bone Mass Measurement 
(age 72 & older, biennial) Requires diagnosis related to osteoporosis or estrogen deficiency. Biennial benefit unless patient has history of long-term glucocorticoid tx or baseline is needed because initial test was by other method Last: 9/30/04 A DEXA scan is recommended after you turn 72years of age to determine your risk for osteoporosis Next: Follow up as recommended by primary care provider and/or specialist    
Colorectal Cancer Screening 
-Fecal occult blood test (annual) -Flexible sigmoidoscopy (5y) 
-Screening colonoscopy (10y) -Barium Enema Normally recommended for patients age 48 - 78  Last: 12/15/15 Every 5-10 years depending on your colonoscopy result starting at age 48 years Next: No longer needed based on age, follow up as recommended by primary care provider and/or specialist  
Glaucoma Screening (no USPSTF recommendation) Diabetes mellitus, family history, , age 48 or over,  American, age 72 or over Last: 11/9/17 Every year, or as directed by provider Next: Follow up as recommended by primary care provider and/or specialist   
Screening Mammography (biennial age 54-69) Annually (age 36 or over) Last: 11/30/17 Next: Follow up as recommended by primary care provider and/or specialist   
Screening Pap Tests and Pelvic Examination (up to age 72 and after 72 if unknown history or abnormal study last 10 years) Every 24 months except high risk Last: N/A Next: Discuss with your doctor if this screening is appropriate for you. Cardiovascular Screening Blood Tests (every 5 years) Total cholesterol, HDL, Triglycerides Order as a panel if possible Last: 11/27/18 Every Year Next: Follow up as recommended by primary care provider and/or specialist   
Diabetes Screening Tests (at least every 3 years, Medicare covers annually or at 6-month intervals for prediabetic patients) Fasting blood sugar (FBS) or glucose tolerance test (GTT) Patient must be diagnosed with one of the following: 
-Hypertension, Dyslipidemia, obesity, previous impaired FBS or GTT 
Or any two of the following: overweight, FH of diabetes, age ? 72, history of gestational diabetes, birth of baby weighing more than 9 pounds Last: 11/27/18 Every 3-6 months depending on your result Every 3 years Next: Follow up as recommended by primary care provider and/or specialist  
Diabetes Self-Management Training (DSMT) (no USPSTF recommendation) Requires referral by treating physician for patient with diabetes or renal disease. 10 hours of initial DSMT session of no less than 30 minutes each in a continuous 12-month period. 2 hours of follow-up DSMT in subsequent years. Last: N/A Talk to your doctor if you are interested in a refresher course. Medical Nutrition Therapy (MNT) (for diabetes or renal disease not recommended schedule) Requires referral by treating physician for patient with diabetes or renal disease. Can be provided in same year as diabetes self-management training (DSMT), and CMS recommends medical nutrition therapy take place after DSMT. Up to 3 hours for initial year and 2 hours in subsequent years. Last: N/A Talk to your doctor if you are interested in a refresher course. Seasonal Influenza Vaccination (annually)  Last: 12/4/18 Every Fall Please consider getting one every fall Pneumococcal Vaccination (once after 65)  Last: 
Pneumovax: 5/23/12 Prevnar-13: 2/21/18 Next: Vaccination series complete Shingles Vaccination  Last: N/A A shingles vaccine is recommended once in a lifetime after age 61 Shingrix available from your local pharmacy, CDC recommends getting this even if you have gotten the previous shingles vaccine Tetanus, Diphtheria and Pertussis (Tdap) Vaccination Booster One Booster as an adult and then tetanus every 10 years or as indicated Last: N/A  Please consider, especially if you will have frequent contact with young children who have not completed their vaccine series. Can get at your pharmacy. Hepatitis B Vaccinations (if medium/high risk) Medium/high risk factors:  End-stage renal disease, Hemophiliacs who received Factor VIII or IX concentrates, Clients of institutions for the mentally retarded, Persons who live in the same house as a HepB virus carrier, Homosexual men, Illicit injectable drug abusers. Last: N/A Next: N/A Counseling to Prevent Tobacco Use (up to 8 sessions per year) - Counseling greater than 3 and up to 10 minutes - Counseling greater than 10 minutes Patients must be asymptomatic of tobacco-related conditions to receive as preventive service  As recommended by your PCP or specialist   
Human Immunodeficiency Virus (HIV) Screening (annually for increased risk patients) HIV-1 and HIV-2 by EIA, ISABELA, rapid antibody test, or oral mucosa transudate Patient must be at increased risk for HIV infection per USPSTF guidelines or pregnant. Tests covered annually for patients at increased risk. Pregnant patients may receive up to 3 test during pregnancy. As recommended by your PCP or Specialist  
Ultrasound Screening for Abdominal Aortic Aneurysm (AAA) once Patient must be referred through IPPE and not have had a screening for abdominal aortic aneurysm before under medicare. Limited to patients who meet onf of the following criteria: 
-Men who are 73-68 years old and have smoked more than 100 cigarettes in their lifetime 
-Anyone with a FH of AAA 
-Anyone recommended for screening by the USPSFTF As recommended by your PCP or Specialist 
  
NA = Not Applicable; NI= Not Indicated Advance Care Planning: Care Instructions Your Care Instructions It can be hard to live with an illness that cannot be cured.  But if your health is getting worse, you may want to make decisions about end-of-life care. Planning for the end of your life does not mean that you are giving up. It is a way to make sure that your wishes are met. Clearly stating your wishes can make it easier for your loved ones. Making plans while you are still able may also ease your mind and make your final days less stressful and more meaningful. Follow-up care is a key part of your treatment and safety. Be sure to make and go to all appointments, and call your doctor if you are having problems. It's also a good idea to know your test results and keep a list of the medicines you take. What can you do to plan for the end of life? · You can bring these issues up with your doctor. You do not need to wait until your doctor starts the conversation. You might start with \"I would not be willing to live with . Elías [place] M[place]in[place] Yanique Jomarriya Chanel \" When you complete this sentence it helps your doctor understand your wishes. · Talk openly and honestly with your doctor. This is the best way to understand the decisions you will need to make as your health changes. Know that you can always change your mind. · Ask your doctor about commonly used life-support measures. These include tube feedings, breathing machines, and fluids given through a vein (IV). Understanding these treatments will help you decide whether you want them. · You may choose to have these life-supporting treatments for a limited time. This allows a trial period to see whether they will help you. You may also decide that you want your doctor to take only certain measures to keep you alive. It is important to spell out these conditions so that your doctor and family understand them. · Talk to your doctor about how long you are likely to live. He or she may be able to give you an idea of what usually happens with your specific illness. · Think about preparing papers that state your wishes.  This way there will not be any confusion about what you want. You can change your instructions at any time. Which papers should you prepare? Advance directives are legal papers that tell doctors how you want to be cared for at the end of your life. You do not need a  to write these papers. Ask your doctor or your [facility] department for information on how to write your advance directives. They may have the forms for each of these types of papers. Make sure your doctor has a copy of these on file, and give a copy to a family member or close friend. · Consider a do-not-resuscitate order (DNR). This order asks that no extra treatments be done if your heart stops or you stop breathing. Extra treatments may include cardiopulmonary resuscitation (CPR), electrical shock to restart your heart, or a machine to breathe for you. If you decide to have a DNR order, ask your doctor to explain and write it. Place the order in your home where everyone can easily see it. · Consider a living will. A living will explains your wishes about life support and other treatments at the end of your life if you become unable to speak for yourself. Living cardenas tell doctors to use or not use treatments that would keep you alive. You must have one or two witnesses or a notary present when you sign this form. · Consider a durable power of  for health care. This allows you to name a person to make decisions about your care if you are not able to. Most people ask a close friend or family member. Talk to this person about the kinds of treatments you want and those that you do not want. Make sure this person understands your wishes. These legal papers are simple to change. Tell your doctor what you want to change, and ask him or her to make a note in your medical file. Give your family updated copies of the papers. Where can you learn more? Go to http://yue-noemy.info/. Enter P184 in the search box to learn more about \"Advance Care Planning: Care Instructions. \" Current as of: April 19, 2018 Content Version: 11.8 © 1881-0503 [employer]. Care instructions adapted under license by [employer] (which disclaims liability or warranty for this information). If you have questions about a medical condition or this instruction, always ask your healthcare professional. [address] any warranty or liability for your use of this information. Vaccine Information Statement Influenza (Flu) Vaccine (Inactivated or Recombinant): What you need to know Many Vaccine Information Statements are available in English and other languages. See www.immunize.org/vis Hojas de Información Sobre Vacunas están disponibles en Español y en muchos otros idiomas. Visite www.immunize.org/vis 1. Why get vaccinated? Influenza (flu) is a contagious disease that spreads around the [facility] every year, usually between October and May. Flu is caused by influenza viruses, and is spread mainly by coughing, sneezing, and close contact. Anyone can get flu. Flu strikes suddenly and can last several days. Symptoms vary by age, but can include: 
 fever/chills  sore throat  muscle aches  fatigue  cough  headache  runny or stuffy nose Flu can also lead to pneumonia and blood infections, and cause diarrhea and seizures in children. If you have a medical condition, such as heart or lung disease, flu can make it worse. Flu is more dangerous for some people. Infants and young children, people 72years of age and older, pregnant women, and people with certain health conditions or a weakened immune system are at greatest risk. Each year thousands of people in the [facility] die from flu, and many more are hospitalized.   
 
Flu vaccine can: 
 keep you from getting flu, 
 make flu less severe if you do get it, and 
  keep you from spreading flu to your family and other people. 2. Inactivated and recombinant flu vaccines A dose of flu vaccine is recommended every flu season. Children 6 months through 6years of age may need two doses during the same flu season. Everyone else needs only one dose each flu season. Some inactivated flu vaccines contain a very small amount of a mercury-based preservative called thimerosal. Studies have not shown thimerosal in vaccines to be harmful, but flu vaccines that do not contain thimerosal are available. There is no live flu virus in flu shots. They cannot cause the flu. There are many flu viruses, and they are always changing. Each year a new flu vaccine is made to protect against three or four viruses that are likely to cause disease in the upcoming flu season. But even when the vaccine doesnt exactly match these viruses, it may still provide some protection Flu vaccine cannot prevent: 
 flu that is caused by a virus not covered by the vaccine, or 
 illnesses that look like flu but are not. It takes about 2 weeks for protection to develop after vaccination, and protection lasts through the flu season. 3. Some people should not get this vaccine Tell the person who is giving you the vaccine:  If you have any severe, life-threatening allergies. If you ever had a life-threatening allergic reaction after a dose of flu vaccine, or have a severe allergy to any part of this vaccine, you may be advised not to get vaccinated. Most, but not all, types of flu vaccine contain a small amount of egg protein.  If you ever had Guillain-Barré Syndrome (also called GBS). Some people with a history of GBS should not get this vaccine. This should be discussed with your doctor.  If you are not feeling well. It is usually okay to get flu vaccine when you have a mild illness, but you might be asked to come back when you feel better. 4. Risks of a vaccine reaction With any medicine, including vaccines, there is a chance of reactions. These are usually mild and go away on their own, but serious reactions are also possible. Most people who get a flu shot do not have any problems with it. Minor problems following a flu shot include:  
 soreness, redness, or swelling where the shot was given  hoarseness  sore, red or itchy eyes  cough  fever  aches  headache  itching  fatigue If these problems occur, they usually begin soon after the shot and last 1 or 2 days. More serious problems following a flu shot can include the following:  There may be a small increased risk of Guillain-Barré Syndrome (GBS) after inactivated flu vaccine. This risk has been estimated at 1 or 2 additional cases per million people vaccinated. This is much lower than the risk of severe complications from flu, which can be prevented by flu vaccine.  Young children who get the flu shot along with pneumococcal vaccine (PCV13) and/or DTaP vaccine at the same time might be slightly more likely to have a seizure caused by fever. Ask your doctor for more information. Tell your doctor if a child who is getting flu vaccine has ever had a seizure. Problems that could happen after any injected vaccine:  People sometimes faint after a medical procedure, including vaccination. Sitting or lying down for about 15 minutes can help prevent fainting, and injuries caused by a fall. Tell your doctor if you feel dizzy, or have vision changes or ringing in the ears.  Some people get severe pain in the shoulder and have difficulty moving the arm where a shot was given. This happens very rarely.  Any medication can cause a severe allergic reaction. Such reactions from a vaccine are very rare, estimated at about 1 in a million doses, and would happen within a few minutes to a few hours after the vaccination. As with any medicine, there is a very remote chance of a vaccine causing a serious injury or death. The safety of vaccines is always being monitored. For more information, visit: www.cdc.gov/vaccinesafety/ 
 
5. What if there is a serious reaction? What should I look for?  Look for anything that concerns you, such as signs of a severe allergic reaction, very high fever, or unusual behavior. Signs of a severe allergic reaction can include hives, swelling of the face and throat, difficulty breathing, a fast heartbeat, dizziness, and weakness  usually within a few minutes to a few hours after the vaccination. What should I do?  If you think it is a severe allergic reaction or other emergency that cant wait, call 9-1-1 and get the person to the nearest hospital. Otherwise, call your doctor.  Reactions should be reported to the Vaccine Adverse Event Reporting System (VAERS). Your doctor should file this report, or you can do it yourself through  the VAERS web site at www.vaers. [facility].gov, or by calling 4-683.416.8179. VAERS does not give medical advice. 6. The National Vaccine Injury Compensation Program 
 
The [facility] Vaccine Injury Compensation Program (VICP) is a federal program that was created to compensate people who may have been injured by certain vaccines. Persons who believe they may have been injured by a vaccine can learn about the program and about filing a claim by calling 1-686.212.6032 or visiting the [employer] website at www.[facility].gov/vaccinecompensation. There is a time limit to file a claim for compensation. 7. How can I learn more?  Ask your healthcare provider. He or she can give you the vaccine package insert or suggest other sources of information.  Call your local or state health department.  Contact the Centers for Disease Control and Prevention (CDC): 
- Call 4-633.889.4625 (1-800-CDC-INFO) or 
- Visit CDCs website at www.cdc.gov/flu Vaccine Information Statement Inactivated Influenza Vaccine 8/7/2015 
42 PAOLA Bailey [id number] Department of [facility] and [employer] Centers for Disease Control and Prevention Office Use Only

## 2018-12-04 NOTE — PROGRESS NOTES
Dr. Meme King referred Narcisa Tay (1940) a 69 y.o. female for a [facility] Visit ([address]) This is a Subsequent Medicare Annual Wellness Visit providing Personalized Prevention Plan Services (PPPS) (Performed 12 months after initial AWV and PPPS ) I have reviewed the patient's medical history in detail and updated the computerized patient record. History Past Medical History:  
Diagnosis Date  Diverticulosis of sigmoid colon 11/13/15  
 mild; Dr. Zakia Tripp  Eczema  Elevated cholesterol  Family history of diabetes mellitus (DM)  Glaucoma  Hyperlipidemia  Hypertension  Indigestion  Interpolated PVCs Past Surgical History:  
Procedure Laterality Date  HX BREAST BIOPSY [address] STEPHAN/BSO  HX OOPHORECTOMY    
 right Current Outpatient Medications Medication Sig Dispense Refill  lisinopril-hydroCHLOROthiazide (PRINZIDE, ZESTORETIC) 20-25 mg per tablet TAKE 1 TABLET BY MOUTH  DAILY 90 Tab 1  
 estradiol (CLIMARA) 0.1 mg/24 hr APPLY 1 PATCH EVERY 7 DAYS 12 Patch 3  
 bimatoprost (LUMIGAN) 0.03 % ophthalmic drops Administer 1 Drop to both eyes every evening.  DORZOLAMIDE HCL/TIMOLOL MALEAT (DORZOLAMIDE-TIMOLOL OP) Apply  to eye.  CALCIUM CARBONATE/VITAMIN D3 (CALCIUM WITH VITAMIN D PO) Take  by mouth.  dexamethasone (DECADRON) 4 mg tablet 1 tablet by mouth twice a day 20 Tab 0  
 HYDROcodone-acetaminophen (NORCO) 7.5-325 mg per tablet 1 tablet every 6 hours as needed for sciatica pain 60 Tab 0 No Known Allergies Family History Problem Relation Age of Onset  Glaucoma Mother  Other Father   
     cerebral aneurysm  Hypertension Sister  Diabetes Sister  Hypertension Sister  Diabetes Paternal Uncle Social History Tobacco Use  Smoking status: Never Smoker  Smokeless tobacco: Never Used Substance Use Topics  Alcohol use:  No  
 Comment: with dinner Patient Active Problem List  
Diagnosis Code  Eczema L30.9  Family history of diabetes mellitus (DM) Z83.3  Glaucoma H40.9  Generalized osteoarthrosis, involving multiple sites M15.9  
 Essential hypertension I10  
 Hyperlipidemia LDL goal <130 E78.5  Advance directive discussed with patient Z70.80  Raynaud's disease without gangrene I73.00  
 Impaired fasting glucose R73.01 Depression Risk Factor Screening: PHQ over the last two weeks 12/4/2018 Little interest or pleasure in doing things Not at all Feeling down, depressed, irritable, or hopeless Not at all Total Score PHQ 2 0 Alcohol Risk Factor Screening: You do not drink alcohol or very rarely. Functional Ability and Level of Safety:  
 
Hearing Loss Hearing is good. Activities of Daily Living The home contains: no safety equipment Patient does total self care Requires assistance with: no ADLs ADL Assessment 12/4/2018 Feeding yourself No Help Needed Getting from bed to chair No Help Needed Getting dressed No Help Needed Bathing or showering No Help Needed Walk across the room (includes cane/walker) No Help Needed Using the telphone No Help Needed Taking your medications No Help Needed Preparing meals No Help Needed Managing money (expenses/bills) No Help Needed Moderately strenuous housework (laundry) No Help Needed Shopping for personal items (toiletries/medicines) No Help Needed Shopping for groceries No Help Needed Driving No Help Needed Climbing a flight of stairs No Help Needed Getting to places beyond walking distances No Help Needed Fall Risk Fall Risk Assessment, last 12 mths 12/4/2018 Able to walk? Yes Fall in past 12 months? No  
 
Abuse Screen Patient is not abused Abuse Screening Questionnaire 12/4/2018 Do you ever feel afraid of your partner? Monae Kim Are you in a relationship with someone who physically or mentally threatens you? Reema Soniakristan Is it safe for you to go home? Rubyjulionayelitracey Lynnette Cognitive Screening Evaluation of Cognitive Function: 
Has your family/caregiver stated any concerns about your memory: no 
Normal 
 
Mood/affect:  neutral 
Appearance: age appropriate and within normal Limits Family member/caregiver input: N/A No exam performed by pharmacist today, not required for [facility] Annual Wellness Visit. Patient to be seen by Dr. Veena Moreno separately. Patient Care Team: 
Veena Moreno MD as PCP - General (Internal Medicine) Arturo Lund RN as Ambulatory Care Navigator (Internal Medicine) Reba Jean-Baptiste MD (Surgery) Ayde Campoverde MD (Ophthalmology) Advice/Referrals/Counseling Education and counseling provided: 
End-of-Life planning (with patient's consent) Pneumococcal Vaccine Influenza Vaccine Screening Mammography Screening Pap and pelvic (covered once every 2 years) Colorectal cancer screening tests Cardiovascular screening blood test 
Bone mass measurement (DEXA) Screening for glaucoma Diabetes screening test 
Tdap / Zoster vaccination recommendations Assessment/Plan ICD-10-CM ICD-9-CM 1. Essential hypertension I10 401.9 2. Encounter for immunization Z23 V03.89 INFLUENZA VACCINE INACTIVATED (IIV), SUBUNIT, ADJUVANTED, IM  
   ADMIN INFLUENZA VIRUS VAC 3. Impaired fasting glucose R73.01 790.21   
4. Hyperlipidemia LDL goal <130 E78.5 272.4 5. Generalized osteoarthrosis, involving multiple sites M15.9 715.09 Karin Quiet 6. Medication Reconciliation: Performed today and patient did not bring her medications to the visit and the following changes were made. Discontinued: none Additions: none Changes / other discrepancies: none There are no discontinued medications. 7. Immunizations: Patient confirmed the following records of vaccinations are correct and current. 
 -Pneumococcal: Vaccination series complete -Influenza: Done today -Zoster:  Received Zostavax previously, educated about availability of Shingrix   
 -Tdap:  Not needed at this time Immunization History Administered Date(s) Administered  H1N1 Influenza Virus Vaccine 12/17/2009  Influenza High Dose Vaccine PF 11/11/2015, 11/14/2016, 11/14/2017  Influenza Vaccine 10/04/2013, 10/20/2014  Influenza Vaccine (Tri) Adjuvanted 12/04/2018  Influenza Vaccine Split 10/05/2011, 10/23/2012  Influenza Vaccine Whole 10/15/2010  Pneumococcal Conjugate (PCV-13) 02/21/2018  ZZZ-RETIRED (DO NOT USE) Pneumococcal Vaccine (Unspecified Type) 05/23/2012 8. Screenings: (see patient instructions for chart/information): 
 -Glaucoma screening/Eye exam: Went yesterday  
 -Mammogram: One scheduled for this Thursday 9. Advanced Care Planning: Patient educated on the importance of an advanced care plan and paperwork was given to the patient today. Asked patient to read over the information and bring it back to her next appointment with her physician. Patient verbalized understanding of information presented. Answered all of the patient's questions. AVS information was reviewed with patient and will be printed on checkout. Aura Rai, PharmD, BCPS, BCACP, BC-ADM

## 2018-12-31 RX ORDER — LISINOPRIL AND HYDROCHLOROTHIAZIDE 20; 25 MG/1; MG/1
TABLET ORAL
Qty: 90 TAB | Refills: 1 | Status: SHIPPED | OUTPATIENT
Start: 2018-12-31 | End: 2019-04-15 | Stop reason: SDUPTHER

## 2019-03-01 RX ORDER — ESTRADIOL 0.1 MG/D
PATCH TRANSDERMAL
Qty: 12 PATCH | Refills: 3 | Status: SHIPPED | OUTPATIENT
Start: 2019-03-01 | End: 2020-03-15

## 2019-04-15 RX ORDER — LISINOPRIL AND HYDROCHLOROTHIAZIDE 20; 25 MG/1; MG/1
TABLET ORAL
Qty: 90 TAB | Refills: 1 | Status: SHIPPED | OUTPATIENT
Start: 2019-04-15 | End: 2020-01-07

## 2019-06-06 ENCOUNTER — OFFICE VISIT (OUTPATIENT)
Dept: INTERNAL MEDICINE CLINIC | Age: 79
End: 2019-06-06

## 2019-06-06 VITALS
TEMPERATURE: 98.6 F | RESPIRATION RATE: 12 BRPM | SYSTOLIC BLOOD PRESSURE: 118 MMHG | BODY MASS INDEX: 22.92 KG/M2 | WEIGHT: 121.4 LBS | HEART RATE: 63 BPM | OXYGEN SATURATION: 96 % | DIASTOLIC BLOOD PRESSURE: 58 MMHG | HEIGHT: 61 IN

## 2019-06-06 DIAGNOSIS — R73.01 IMPAIRED FASTING GLUCOSE: ICD-10-CM

## 2019-06-06 DIAGNOSIS — M54.32 SCIATICA OF LEFT SIDE: ICD-10-CM

## 2019-06-06 DIAGNOSIS — E78.5 HYPERLIPIDEMIA LDL GOAL <130: ICD-10-CM

## 2019-06-06 DIAGNOSIS — I10 ESSENTIAL HYPERTENSION: Primary | ICD-10-CM

## 2019-06-06 RX ORDER — HYDROCODONE BITARTRATE AND ACETAMINOPHEN 7.5; 325 MG/1; MG/1
1 TABLET ORAL
Qty: 20 TAB | Refills: 0 | Status: SHIPPED | OUTPATIENT
Start: 2019-06-06 | End: 2019-07-06

## 2019-06-06 RX ORDER — DEXAMETHASONE 4 MG/1
TABLET ORAL
Qty: 20 TAB | Refills: 0 | Status: SHIPPED | OUTPATIENT
Start: 2019-06-06 | End: 2019-12-13 | Stop reason: ALTCHOICE

## 2019-06-06 NOTE — PROGRESS NOTES
Torsten River 1940, is a 69 y.o. female, who is seen today for reevaluation of hypertension hyperlipidemia impaired fasting glucose history of sciatica also complaining of lumbar and neck stiffness lately. Walking is not a problem but first thing in the morning her neck is a little stiff posteriorly and when she gets down on the ground in the yard her lumbar area is a little stiff as well. She is required no medicine. She had rather severe sciatica on the right a few years ago when she was in [facility] but no severe pain since then. Moderate pain a couple of times quickly cleared with dexamethasone. She would like to get fresh dexamethasone and include him to take to [facility] when she leaves in early July, she will be gone for the 20th months. Past Medical History:   Diagnosis Date    Diverticulosis of sigmoid colon 11/13/15    mild; Dr. Nicole Police Eczema     Elevated cholesterol     Family history of diabetes mellitus (DM)     Glaucoma     Hyperlipidemia     Hypertension     Indigestion     Interpolated PVCs      Current Outpatient Medications   Medication Sig Dispense Refill    HYDROcodone-acetaminophen (NORCO) 7.5-325 mg per tablet Take 1 Tab by mouth every six (6) hours as needed for Pain for up to 30 days. Max Daily Amount: 4 Tabs. 1 tablet every 6 hours as needed for sciatica pain 20 Tab 0    dexamethasone (DECADRON) 4 mg tablet 1 tablet by mouth twice a day 20 Tab 0    lisinopril-hydroCHLOROthiazide (PRINZIDE, ZESTORETIC) 20-25 mg per tablet TAKE 1 TABLET BY MOUTH  DAILY 90 Tab 1    estradiol (CLIMARA) 0.1 mg/24 hr APPLY 1 PATCH EVERY 7 DAYS 12 Patch 3    bimatoprost (LUMIGAN) 0.03 % ophthalmic drops Administer 1 Drop to both eyes every evening.  DORZOLAMIDE HCL/TIMOLOL MALEAT (DORZOLAMIDE-TIMOLOL OP) Apply  to eye.  CALCIUM CARBONATE/VITAMIN D3 (CALCIUM WITH VITAMIN D PO) Take  by mouth.        Visit Vitals  /58 (BP 1 Location: Left arm, BP Patient Position: Sitting)   Pulse 63   Temp 98.6 °F (37 °C) (Oral)   Resp 12   Ht 5' 1\" (1.549 m)   Wt 121 lb 6.4 oz (55.1 kg)   SpO2 96%   BMI 22.94 kg/m²     Carotids are 2+ without bruits. Her neck is very supple. No tenderness. Lungs are clear to percussion. Good breath sounds with no wheezing or crackles. Heart reveals a regular rhythm with normal S1 and S2 no murmur gallop click or rub. Apical impulse is not palpable. Abdomen is soft and nontender with no hepatosplenomegaly or masses and no bruits. Extremities reveal no clubbing cyanosis or edema. Pulses are 2+. Very good range of motion of the lumbar spine, forward flexion to 90 degrees without discomfort. Tenderness. Side-to-side movement is good as well. Assessment: #1. Hypertension with blood pressure better than usual, she will continue lisinopril with hydrochlorthiazide 1 daily. #2.  Impaired fasting glucose, she will continue healthy diet and avoid significant weight gain. #3. Hyperlipidemia, she is going to continue a very healthy diet. #4.  Lumbar neck stiffness with no evidence of significant arthropathy, she will use Tylenol if needed. #5.  History of sciatica on the right, did give her a prescription for Norco 20 tablets and dexamethasone should these be needed, particularly when she is [facility] where she did not get the best care when she had severe sciatica a few years ago. Follow-up in 6 months for evaluation or sooner if needed    He Macario MD FACP    Please note: This document has been produced using voice recognition software. Unrecognized errors in transcription may be present.

## 2019-12-06 ENCOUNTER — HOSPITAL ENCOUNTER (OUTPATIENT)
Dept: LAB | Age: 79
Discharge: HOME | End: 2019-12-06
Payer: MEDICARE

## 2019-12-06 ENCOUNTER — APPOINTMENT (OUTPATIENT)
Dept: INTERNAL MEDICINE CLINIC | Age: 79
End: 2019-12-06

## 2019-12-06 DIAGNOSIS — I10 ESSENTIAL HYPERTENSION: ICD-10-CM

## 2019-12-06 DIAGNOSIS — R73.01 IMPAIRED FASTING GLUCOSE: ICD-10-CM

## 2019-12-06 DIAGNOSIS — E78.5 HYPERLIPIDEMIA LDL GOAL <130: ICD-10-CM

## 2019-12-06 LAB
ALBUMIN SERPL-MCNC: 3.8 G/DL (ref 3.4–5)
ALBUMIN/GLOB SERPL: 1.2 {RATIO} (ref 0.8–1.7)
ALP SERPL-CCNC: 49 U/L (ref 45–117)
ALT SERPL-CCNC: 19 U/L (ref 13–56)
ANION GAP SERPL CALC-SCNC: 4 MMOL/L (ref 3–18)
AST SERPL-CCNC: 16 U/L (ref 10–38)
BASOPHILS # BLD: 0 K/UL (ref 0–0.1)
BASOPHILS NFR BLD: 1 % (ref 0–2)
BILIRUB SERPL-MCNC: 0.4 MG/DL (ref 0.2–1)
BUN SERPL-MCNC: 12 MG/DL (ref 7–18)
BUN/CREAT SERPL: 18 (ref 12–20)
CALCIUM SERPL-MCNC: 9.2 MG/DL (ref 8.5–10.1)
CHLORIDE SERPL-SCNC: 99 MMOL/L (ref 100–111)
CO2 SERPL-SCNC: 29 MMOL/L (ref 21–32)
CREAT SERPL-MCNC: 0.65 MG/DL (ref 0.6–1.3)
DIFFERENTIAL METHOD BLD: ABNORMAL
EOSINOPHIL # BLD: 0.1 K/UL (ref 0–0.4)
EOSINOPHIL NFR BLD: 3 % (ref 0–5)
ERYTHROCYTE [DISTWIDTH] IN BLOOD BY AUTOMATED COUNT: 13.4 % (ref 11.6–14.5)
EST. AVERAGE GLUCOSE BLD GHB EST-MCNC: 128 MG/DL
GLOBULIN SER CALC-MCNC: 3.1 G/DL (ref 2–4)
GLUCOSE SERPL-MCNC: 114 MG/DL (ref 74–99)
HBA1C MFR BLD: 6.1 % (ref 4.2–5.6)
HCT VFR BLD AUTO: 38 % (ref 35–45)
HGB BLD-MCNC: 12.4 G/DL (ref 12–16)
LYMPHOCYTES # BLD: 1.1 K/UL (ref 0.9–3.6)
LYMPHOCYTES NFR BLD: 25 % (ref 21–52)
MCH RBC QN AUTO: 30.8 PG (ref 24–34)
MCHC RBC AUTO-ENTMCNC: 32.6 G/DL (ref 31–37)
MCV RBC AUTO: 94.5 FL (ref 74–97)
MONOCYTES # BLD: 0.5 K/UL (ref 0.05–1.2)
MONOCYTES NFR BLD: 12 % (ref 3–10)
NEUTS SEG # BLD: 2.6 K/UL (ref 1.8–8)
NEUTS SEG NFR BLD: 59 % (ref 40–73)
PLATELET # BLD AUTO: 266 K/UL (ref 135–420)
PMV BLD AUTO: 10.9 FL (ref 9.2–11.8)
POTASSIUM SERPL-SCNC: 3.8 MMOL/L (ref 3.5–5.5)
PROT SERPL-MCNC: 6.9 G/DL (ref 6.4–8.2)
RBC # BLD AUTO: 4.02 M/UL (ref 4.2–5.3)
SODIUM SERPL-SCNC: 132 MMOL/L (ref 136–145)
WBC # BLD AUTO: 4.4 K/UL (ref 4.6–13.2)

## 2019-12-06 PROCEDURE — 80061 LIPID PANEL: CPT

## 2019-12-06 PROCEDURE — 85025 COMPLETE CBC W/AUTO DIFF WBC: CPT

## 2019-12-06 PROCEDURE — 83036 HEMOGLOBIN GLYCOSYLATED A1C: CPT

## 2019-12-06 PROCEDURE — 36415 COLL VENOUS BLD VENIPUNCTURE: CPT

## 2019-12-06 PROCEDURE — 80053 COMPREHEN METABOLIC PANEL: CPT

## 2019-12-07 LAB
CHOLEST SERPL-MCNC: 250 MG/DL
HDLC SERPL-MCNC: 61 MG/DL (ref 40–60)
HDLC SERPL: 4.1 {RATIO} (ref 0–5)
LDLC SERPL CALC-MCNC: 168.2 MG/DL (ref 0–100)
LIPID PROFILE,FLP: ABNORMAL
TRIGL SERPL-MCNC: 104 MG/DL (ref ?–150)
VLDLC SERPL CALC-MCNC: 20.8 MG/DL

## 2019-12-13 ENCOUNTER — OFFICE VISIT (OUTPATIENT)
Dept: INTERNAL MEDICINE CLINIC | Age: 79
End: 2019-12-13

## 2019-12-13 VITALS
OXYGEN SATURATION: 99 % | HEART RATE: 62 BPM | WEIGHT: 120 LBS | RESPIRATION RATE: 12 BRPM | HEIGHT: 61 IN | BODY MASS INDEX: 22.66 KG/M2 | TEMPERATURE: 98.3 F | SYSTOLIC BLOOD PRESSURE: 130 MMHG | DIASTOLIC BLOOD PRESSURE: 76 MMHG

## 2019-12-13 DIAGNOSIS — E78.5 HYPERLIPIDEMIA LDL GOAL <130: ICD-10-CM

## 2019-12-13 DIAGNOSIS — R73.01 IMPAIRED FASTING GLUCOSE: ICD-10-CM

## 2019-12-13 DIAGNOSIS — I10 PRIMARY HYPERTENSION: Primary | ICD-10-CM

## 2019-12-13 DIAGNOSIS — Z23 ENCOUNTER FOR IMMUNIZATION: ICD-10-CM

## 2019-12-13 RX ORDER — SCOLOPAMINE TRANSDERMAL SYSTEM 1 MG/1
1 PATCH, EXTENDED RELEASE TRANSDERMAL
Qty: 4 PATCH | Refills: 0 | Status: SHIPPED | OUTPATIENT
Start: 2019-12-13 | End: 2020-07-24 | Stop reason: ALTCHOICE

## 2019-12-13 RX ORDER — FLUTICASONE PROPIONATE 50 MCG
SPRAY, SUSPENSION (ML) NASAL
Qty: 1 BOTTLE | Refills: 5 | Status: SHIPPED | OUTPATIENT
Start: 2019-12-13 | End: 2021-05-27

## 2019-12-13 NOTE — PROGRESS NOTES
Elaine Mathews 1940, is a 67 y.o. female, who is seen today for reevaluation of hypertension impaired fasting glucose hyperlipidemia history of sciatica. She notes that her sciatica has bothered her a little bit lately, better walking than sitting but not enough to take any medicine. She is waking up with frontal headaches for quite some time she is not sure if it is due to glaucoma or something else but after getting up and around it gets better. Some nasal congestion at times. She takes her medicine regularly. She follows a very healthy diet. She is planning on going to cruise right after the first of the year with daughter and granddaughter. Past Medical History:   Diagnosis Date    Diverticulosis of sigmoid colon 11/13/15    mild; Dr. Justyn Araujo Eczema     Elevated cholesterol     Family history of diabetes mellitus (DM)     Glaucoma     Hyperlipidemia     Hypertension     Indigestion     Interpolated PVCs      Past Surgical History:   Procedure Laterality Date    HX BREAST BIOPSY      HX HYSTERECTOMY  1981    STEPHAN/BSO    HX OOPHORECTOMY      right     Current Outpatient Medications   Medication Sig Dispense Refill    lisinopril-hydroCHLOROthiazide (PRINZIDE, ZESTORETIC) 20-25 mg per tablet TAKE 1 TABLET BY MOUTH  DAILY 90 Tab 1    estradiol (CLIMARA) 0.1 mg/24 hr APPLY 1 PATCH EVERY 7 DAYS 12 Patch 3    bimatoprost (LUMIGAN) 0.03 % ophthalmic drops Administer 1 Drop to both eyes every evening.  DORZOLAMIDE HCL/TIMOLOL MALEAT (DORZOLAMIDE-TIMOLOL OP) Apply  to eye.  CALCIUM CARBONATE/VITAMIN D3 (CALCIUM WITH VITAMIN D PO) Take  by mouth.        No Known Allergies  Social History     Socioeconomic History    Marital status:      Spouse name: Not on file    Number of children: Not on file    Years of education: Not on file    Highest education level: Not on file   Tobacco Use    Smoking status: Never Smoker    Smokeless tobacco: Never Used   Substance and Sexual Activity    Alcohol use: No     Comment: with dinner    Drug use: No    Sexual activity: Not Currently     Visit Vitals  /76 (BP 1 Location: Left arm, BP Patient Position: Sitting)   Pulse 62   Temp 98.3 °F (36.8 °C) (Oral)   Resp 12   Ht 5' 1\" (1.549 m)   Wt 120 lb (54.4 kg)   SpO2 99%   BMI 22.67 kg/m²     Nasal passages reveal minimal clear tenacious secretions. Pharynx reveals no redness exudate or drainage. Sinuses are nontender. Neck reveals no adenopathy or thyromegaly. Carotids are 2+ without bruits. Lungs are clear to percussion. Good breath sounds with no wheezing or crackles. Heart reveals a regular rhythm with normal S1 and S2 no murmur gallop click or rub. Apical impulse is not palpable. Abdomen is soft and nontender with no hepatosplenomegaly or masses and no bruits. Extremities reveal no clubbing cyanosis or edema. Pulses are 2+. Breasts reveal no masses no skin or nipple abnormalities and no axillary adenopathy. Assessment: #1. Hypertension is controlled, she will continue lisinopril with hydrochlorothiazide 1 tablet daily. #2.  Impaired fasting glucose doing well, she will continue very healthy diet and avoid significant weight gain. #3. Hyperlipidemia doing slightly better, she will continue very healthy diet. #4.  Sciatica doing a lot better than it had been a few years ago, does not require medicine right now. #5.  Frontal headaches every morning may be related to allergies, I will try her on Flonase 2 sprays in each nostril daily for at least a month. #6.  Upcoming cruise, she would like something for seasickness, she has not been on a cruise for many years but did get a little seasick that time. I will try her on Transderm-Scop. Follow-up in 6 months and we will give her a flu shot now      [address]. Deisy Parra MD FACP    Please note: This document has been produced using voice recognition software.  Unrecognized errors in transcription may be present.

## 2019-12-13 NOTE — PATIENT INSTRUCTIONS
Vaccine Information Statement Influenza (Flu) Vaccine (Inactivated or Recombinant): What You Need to Know Many Vaccine Information Statements are available in Croatian and other languages. See www.immunize.org/vis Hojas de información sobre vacunas están disponibles en español y en muchos otros idiomas. Visite www.immunize.org/vis 1. Why get vaccinated? Influenza vaccine can prevent influenza (flu). Flu is a contagious disease that spreads around the [facility] every year, usually between October and May. Anyone can get the flu, but it is more dangerous for some people. Infants and young children, people 72years of age and older, pregnant women, and people with certain health conditions or a weakened immune system are at greatest risk of flu complications. Pneumonia, bronchitis, sinus infections and ear infections are examples of flu-related complications. If you have a medical condition, such as heart disease, cancer or diabetes, flu can make it worse. Flu can cause fever and chills, sore throat, muscle aches, fatigue, cough, headache, and runny or stuffy nose. Some people may have vomiting and diarrhea, though this is more common in children than adults. Each year thousands of people in the [facility] die from flu, and many more are hospitalized. Flu vaccine prevents millions of illnesses and flu-related visits to the doctor each year. 2. Influenza vaccines CDC recommends everyone 10months of age and older get vaccinated every flu season. Children 6 months through 6years of age may need 2 doses during a single flu season. Everyone else needs only 1 dose each flu season. It takes about 2 weeks for protection to develop after vaccination. There are many flu viruses, and they are always changing. Each year a new flu vaccine is made to protect against three or four viruses that are likely to cause disease in the upcoming flu season.  Even when the vaccine doesnt exactly match these viruses, it may still provide some protection. Influenza vaccine does not cause flu. Influenza vaccine may be given at the same time as other vaccines. 3. Talk with your health care provider Tell your vaccine provider if the person getting the vaccine: 
 Has had an allergic reaction after a previous dose of influenza vaccine, or has any severe, life-threatening allergies.  Has ever had Guillain-Barré Syndrome (also called GBS). In some cases, your health care provider may decide to postpone influenza vaccination to a future visit. People with minor illnesses, such as a cold, may be vaccinated. People who are moderately or severely ill should usually wait until they recover before getting influenza vaccine. Your health care provider can give you more information. 4. Risks of a reaction  Soreness, redness, and swelling where shot is given, fever, muscle aches, and headache can happen after influenza vaccine.  There may be a very small increased risk of Guillain-Barré Syndrome (GBS) after inactivated influenza vaccine (the flu shot). [facility] children who get the flu shot along with pneumococcal vaccine (PCV13), and/or DTaP vaccine at the same time might be slightly more likely to have a seizure caused by fever. Tell your health care provider if a child who is getting flu vaccine has ever had a seizure. People sometimes faint after medical procedures, including vaccination. Tell your provider if you feel dizzy or have vision changes or ringing in the ears. As with any medicine, there is a very remote chance of a vaccine causing a severe allergic reaction, other serious injury, or death. 5. What if there is a serious problem? An allergic reaction could occur after the vaccinated person leaves the clinic.  If you see signs of a severe allergic reaction (hives, swelling of the face and throat, difficulty breathing, a fast heartbeat, dizziness, or weakness), call 9-1-1 and get the person to the nearest hospital. 
 
For other signs that concern you, call your health care provider. Adverse reactions should be reported to the Vaccine Adverse Event Reporting System (VAERS). Your health care provider will usually file this report, or you can do it yourself. Visit the VAERS website at www.vaers. hhs.gov or call 1-150.170.9486. VAERS is only for reporting reactions, and VAERS staff do not give medical advice. 6. The National Vaccine Injury Compensation Program 
 
The [facility] Vaccine Injury Compensation Program (VICP) is a federal program that was created to compensate people who may have been injured by certain vaccines. Visit the VICP website at www.hrsa.gov/vaccinecompensation or call 0-770.289.8124 to learn about the program and about filing a claim. There is a time limit to file a claim for compensation. 7. How can I learn more?  Ask your health care provider.  Call your local or state health department.  Contact the Centers for Disease Control and Prevention (CDC): 
- Call 3-104.206.2325 (1-800-CDC-INFO) or 
- Visit CDCs influenza website at www.cdc.gov/flu Vaccine Information Statement (Interim) Inactivated Influenza Vaccine 8/15/2019 
42 PAOLA Tovar [id number]-61 Department of Health and [employer] Centers for Disease Control and Prevention Office Use Only

## 2019-12-17 ENCOUNTER — DOCUMENTATION ONLY (OUTPATIENT)
Dept: INTERNAL MEDICINE CLINIC | Age: 79
End: 2019-12-17

## 2019-12-17 NOTE — PROGRESS NOTES
Received fax from [address] patient needed a PA on scopolamine patch. PA was [facility] to [facility]. Received notification from [facility] that the patch does not require a PA.

## 2020-01-07 RX ORDER — LISINOPRIL AND HYDROCHLOROTHIAZIDE 20; 25 MG/1; MG/1
TABLET ORAL
Qty: 90 TAB | Refills: 1 | Status: SHIPPED | OUTPATIENT
Start: 2020-01-07 | End: 2020-05-31

## 2020-01-20 ENCOUNTER — OFFICE VISIT (OUTPATIENT)
Dept: INTERNAL MEDICINE CLINIC | Age: 80
End: 2020-01-20

## 2020-01-20 VITALS
RESPIRATION RATE: 18 BRPM | TEMPERATURE: 97.7 F | SYSTOLIC BLOOD PRESSURE: 146 MMHG | WEIGHT: 123 LBS | DIASTOLIC BLOOD PRESSURE: 58 MMHG | BODY MASS INDEX: 23.22 KG/M2 | HEART RATE: 60 BPM | HEIGHT: 61 IN

## 2020-01-20 DIAGNOSIS — J06.9 UPPER RESPIRATORY TRACT INFECTION, UNSPECIFIED TYPE: Primary | ICD-10-CM

## 2020-01-20 RX ORDER — AZITHROMYCIN 250 MG/1
TABLET, FILM COATED ORAL
Qty: 6 TAB | Refills: 0 | Status: SHIPPED | OUTPATIENT
Start: 2020-01-20 | End: 2020-01-25

## 2020-01-20 NOTE — PROGRESS NOTES
Angela Garza 1940, is a 67 y.o. female, who is seen today for 7-day history of worsening respiratory symptoms. Initially she had a slightly scratchy throat but no bad throat pain and nasal congestion and drainage but since then she has developed laryngitis and now coughing more more. She feels like she has to cough up something but nothing is coming up. She has had no chills and no unusual exposures. No myalgia no headache. Some pressure in the left ear but none on the right, and that is quite mild. She has a slightly loose cough here in the office. Past Medical History:   Diagnosis Date    Diverticulosis of sigmoid colon 11/13/15    mild; Dr. Alex Grady Eczema     Elevated cholesterol     Family history of diabetes mellitus (DM)     Glaucoma     Hyperlipidemia     Hypertension     Indigestion     Interpolated PVCs      Current Outpatient Medications   Medication Sig Dispense Refill    lisinopril-hydroCHLOROthiazide (PRINZIDE, ZESTORETIC) 20-25 mg per tablet TAKE 1 TABLET BY MOUTH  DAILY 90 Tab 1    fluticasone propionate (FLONASE) 50 mcg/actuation nasal spray 2 sprays in each nostril daily 1 Bottle 5    scopolamine (TRANSDERM-SCOP) 1 mg over 3 days pt3d 1 Patch by TransDERmal route every seventy-two (72) hours. 4 Patch 0    estradiol (CLIMARA) 0.1 mg/24 hr APPLY 1 PATCH EVERY 7 DAYS 12 Patch 3    bimatoprost (LUMIGAN) 0.03 % ophthalmic drops Administer 1 Drop to both eyes every evening.  DORZOLAMIDE HCL/TIMOLOL MALEAT (DORZOLAMIDE-TIMOLOL OP) Apply  to eye.  CALCIUM CARBONATE/VITAMIN D3 (CALCIUM WITH VITAMIN D PO) Take  by mouth. Visit Vitals  /58   Pulse 60   Temp 97.7 °F (36.5 °C) (Oral)   Resp 18   Ht 5' 1\" (1.549 m)   Wt 123 lb (55.8 kg)   BMI 23.24 kg/m²     She has a slightly loose cough here in the office today. The left tympanic membrane appears normal with good light reflex. The right was not seen because of wax.   Oral cavity reveals no redness exudate or drainage. Neck reveals no adenopathy or tenderness. Sinuses are nontender. Lungs are clear to percussion. Good breath sounds with no wheezing or crackles. With forced expiration she does not wheeze or cough. Assessment: Worsening upper respiratory infection, explained to her that she has no evidence of pneumonia or influenza or any unusual infection. We will treat with a azithromycin. I explained how this works and that it will continue to work 5 days beyond the day she finishes the medicine and she is to call me if still not improved over the next 8 to 10 days. She will call me sooner if she worsens. This visit lasted 25 minutes, greater than 50% of the time spent counseling as above.

## 2020-01-20 NOTE — PROGRESS NOTES
Debbie Corcoran presents today for   Chief Complaint   Patient presents with    Cold Symptoms     x 1 week. Patient denies any fever or chills. EXAM room 9    Hoarse              Depression Screening:  3 most recent PHQ Screens 1/20/2020   Little interest or pleasure in doing things Not at all   Feeling down, depressed, irritable, or hopeless Not at all   Total Score PHQ 2 0       Learning Assessment:  Learning Assessment 4/20/2015   PRIMARY LEARNER Patient   HIGHEST LEVEL OF EDUCATION - PRIMARY LEARNER  GRADUATED HIGH SCHOOL OR GED   BARRIERS PRIMARY LEARNER NONE   PRIMARY LANGUAGE OTHER (COMMENT)   LEARNER PREFERENCE PRIMARY READING     VIDEOS   ANSWERED BY Patient    RELATIONSHIP SELF       Abuse Screening:  Abuse Screening Questionnaire 1/20/2020   Do you ever feel afraid of your partner? N   Are you in a relationship with someone who physically or mentally threatens you? N   Is it safe for you to go home? Y       Fall Risk  Fall Risk Assessment, last 12 mths 1/20/2020   Able to walk? Yes   Fall in past 12 months? No           Coordination of Care:  1. Have you been to the ER, urgent care clinic since your last visit? Hospitalized since your last visit? no    2. Have you seen or consulted any other health care providers outside of the [address] [place] [address] since your last visit? Include any pap smears or colon screening. no      Advance Directive:  1. Do you have an advance directive in place? Patient Reply:no    2. If not, would you like material regarding how to put one in place?  Patient Reply: no

## 2020-01-27 ENCOUNTER — TELEPHONE (OUTPATIENT)
Dept: INTERNAL MEDICINE CLINIC | Age: 80
End: 2020-01-27

## 2020-01-27 RX ORDER — BENZONATATE 200 MG/1
200 CAPSULE ORAL
Qty: 20 CAP | Refills: 0 | Status: SHIPPED | OUTPATIENT
Start: 2020-01-27 | End: 2020-02-03

## 2020-01-27 NOTE — TELEPHONE ENCOUNTER
Amie Negro MD  [facility] 46 minutes ago (8:19 AM)      It is not unusual for the cough to last a while after treatment of an upper respiratory infection, I sent a prescription for cough suppressant to her pharmacy.

## 2020-02-18 ENCOUNTER — OFFICE VISIT (OUTPATIENT)
Dept: INTERNAL MEDICINE CLINIC | Age: 80
End: 2020-02-18

## 2020-02-18 VITALS
DIASTOLIC BLOOD PRESSURE: 70 MMHG | HEIGHT: 61 IN | RESPIRATION RATE: 16 BRPM | BODY MASS INDEX: 22.84 KG/M2 | WEIGHT: 121 LBS | OXYGEN SATURATION: 96 % | SYSTOLIC BLOOD PRESSURE: 128 MMHG | TEMPERATURE: 98.4 F | HEART RATE: 58 BPM

## 2020-02-18 DIAGNOSIS — J20.9 BRONCHITIS WITH BRONCHOSPASM: Primary | ICD-10-CM

## 2020-02-18 RX ORDER — PREDNISONE 20 MG/1
TABLET ORAL
Qty: 30 TAB | Refills: 0 | Status: SHIPPED | OUTPATIENT
Start: 2020-02-18 | End: 2020-07-24 | Stop reason: ALTCHOICE

## 2020-02-18 NOTE — PROGRESS NOTES
Geovani Weinstein 1940, is a 67 y.o. female, who is seen today for continued cough. I had seen her over a month ago and treated with azithromycin, she got somewhat better but the cough did not clear now it is worse than ever. It keeps her from getting to sleep at night and when she sleeps on 2 pillows. It feels loose and occasionally she brings up a little clear mucus but oftentimes is a nonproductive cough. She can hear some wheezing and she has mild dyspnea on exertion. She has had no chills or fever. Minimal nasal congestion. Past Medical History:   Diagnosis Date    Diverticulosis of sigmoid colon 11/13/15    mild; Dr. Anuja Manrique Eczema     Elevated cholesterol     Family history of diabetes mellitus (DM)     Glaucoma     Hyperlipidemia     Hypertension     Indigestion     Interpolated PVCs      Current Outpatient Medications   Medication Sig Dispense Refill    lisinopril-hydroCHLOROthiazide (PRINZIDE, ZESTORETIC) 20-25 mg per tablet TAKE 1 TABLET BY MOUTH  DAILY 90 Tab 1    fluticasone propionate (FLONASE) 50 mcg/actuation nasal spray 2 sprays in each nostril daily 1 Bottle 5    scopolamine (TRANSDERM-SCOP) 1 mg over 3 days pt3d 1 Patch by TransDERmal route every seventy-two (72) hours. 4 Patch 0    estradiol (CLIMARA) 0.1 mg/24 hr APPLY 1 PATCH EVERY 7 DAYS 12 Patch 3    bimatoprost (LUMIGAN) 0.03 % ophthalmic drops Administer 1 Drop to both eyes every evening.  DORZOLAMIDE HCL/TIMOLOL MALEAT (DORZOLAMIDE-TIMOLOL OP) Apply  to eye.  CALCIUM CARBONATE/VITAMIN D3 (CALCIUM WITH VITAMIN D PO) Take  by mouth. Visit Vitals  /70 (BP 1 Location: Left arm, BP Patient Position: Sitting)   Pulse (!) 58   Temp 98.4 °F (36.9 °C) (Oral)   Resp 16   Ht 5' 1\" (1.549 m)   Wt 121 lb (54.9 kg)   SpO2 96%   BMI 22.86 kg/m²     Nasal passages are clear. Pharynx reveals no redness exudate or drainage. Neck reveals no adenopathy or tenderness. Lungs are clear to percussion. Good breath sounds with no crackles but with forced expiration she has diffuse expiratory wheezing. Assessment: Bronchitis with bronchospasm, she had no evidence of bronchospasm the first time I saw her several weeks ago, at this point she will be treated with prednisone in tapering fashion. Explained how that is she is stent what to expect as far as clearing of the cough but also possible side effects, she will call me if she has significant side effects. He Gonzalez MD FACP    Please note: This document has been produced using voice recognition software. Unrecognized errors in transcription may be present. This visit lasted 25 minutes, greater than 50% of the time spent counseling.

## 2020-02-18 NOTE — PROGRESS NOTES
Miracle Son presents today for   Chief Complaint   Patient presents with    Cold Symptoms     reports on-going productive cough               Depression Screening:  3 most recent PHQ Screens 1/20/2020   Little interest or pleasure in doing things Not at all   Feeling down, depressed, irritable, or hopeless Not at all   Total Score PHQ 2 0       Learning Assessment:  Learning Assessment 4/20/2015   PRIMARY LEARNER Patient   HIGHEST LEVEL OF EDUCATION - PRIMARY LEARNER  GRADUATED HIGH SCHOOL OR GED   BARRIERS PRIMARY LEARNER NONE   PRIMARY LANGUAGE OTHER (COMMENT)   LEARNER PREFERENCE PRIMARY READING     VIDEOS   ANSWERED BY Patient    RELATIONSHIP SELF       Abuse Screening:  Abuse Screening Questionnaire 1/20/2020   Do you ever feel afraid of your partner? N   Are you in a relationship with someone who physically or mentally threatens you? N   Is it safe for you to go home? Y       Fall Risk  Fall Risk Assessment, last 12 mths 1/20/2020   Able to walk? Yes   Fall in past 12 months? No           Coordination of Care:  1. Have you been to the ER, urgent care clinic since your last visit? Hospitalized since your last visit? no    2. Have you seen or consulted any other health care providers outside of the [address] [place] [address] since your last visit? Include any pap smears or colon screening.  no

## 2020-05-31 RX ORDER — LISINOPRIL AND HYDROCHLOROTHIAZIDE 20; 25 MG/1; MG/1
TABLET ORAL
Qty: 90 TAB | Refills: 1 | Status: SHIPPED | OUTPATIENT
Start: 2020-05-31 | End: 2020-07-24 | Stop reason: SDUPTHER

## 2020-07-24 ENCOUNTER — OFFICE VISIT (OUTPATIENT)
Dept: INTERNAL MEDICINE CLINIC | Age: 80
End: 2020-07-24

## 2020-07-24 VITALS
BODY MASS INDEX: 22.09 KG/M2 | RESPIRATION RATE: 14 BRPM | HEIGHT: 61 IN | TEMPERATURE: 98.1 F | WEIGHT: 117 LBS | HEART RATE: 72 BPM | DIASTOLIC BLOOD PRESSURE: 78 MMHG | SYSTOLIC BLOOD PRESSURE: 138 MMHG

## 2020-07-24 DIAGNOSIS — E78.5 HYPERLIPIDEMIA LDL GOAL <130: ICD-10-CM

## 2020-07-24 DIAGNOSIS — M54.31 SCIATICA OF RIGHT SIDE: ICD-10-CM

## 2020-07-24 DIAGNOSIS — R73.01 IMPAIRED FASTING GLUCOSE: ICD-10-CM

## 2020-07-24 DIAGNOSIS — M54.32 SCIATICA OF LEFT SIDE: ICD-10-CM

## 2020-07-24 DIAGNOSIS — I10 PRIMARY HYPERTENSION: Primary | ICD-10-CM

## 2020-07-24 RX ORDER — LISINOPRIL AND HYDROCHLOROTHIAZIDE 20; 25 MG/1; MG/1
TABLET ORAL
Qty: 90 TAB | Refills: 3 | Status: SHIPPED | OUTPATIENT
Start: 2020-07-24 | End: 2020-11-03 | Stop reason: SDUPTHER

## 2020-07-24 RX ORDER — DEXAMETHASONE 4 MG/1
TABLET ORAL
Qty: 20 TAB | Refills: 0 | Status: SHIPPED | OUTPATIENT
Start: 2020-07-24 | End: 2021-02-19 | Stop reason: ALTCHOICE

## 2020-07-24 RX ORDER — HYDROCODONE BITARTRATE AND ACETAMINOPHEN 7.5; 325 MG/1; MG/1
1 TABLET ORAL
Qty: 20 TAB | Refills: 0 | Status: SHIPPED | OUTPATIENT
Start: 2020-07-24 | End: 2020-07-31

## 2020-07-24 NOTE — PROGRESS NOTES
Camille Osman 1940, is a 79 y.o. female, who is seen today for reevaluation of hypertension, hyperlipidemia, sciatica, impaired fasting glucose. She had a severe case of sciatica a few years ago when she was in [facility] and was not adequately treated but when she got back I treated her with dexamethasone and she responded very nicely. She likes to keep fresh dexamethasone and a small quantity of Norco available in case this would ever happen again particularly on trips though she will not be able to go to [facility] this August for the first time in many years. She still has back pain particularly when sitting and less so with walking that goes down the back of her right leg a lot of times, usually just below the knee. She uses Tylenol as needed but has not required any Norco and has not used any dexamethasone in quite a while. Because her medicines are expiring she would like to have fresh medication. Past Medical History:   Diagnosis Date    Diverticulosis of sigmoid colon 11/13/15    mild; Dr. Jacquelyn Ramon Eczema     Elevated cholesterol     Family history of diabetes mellitus (DM)     Glaucoma     Hyperlipidemia     Hypertension     Indigestion     Interpolated PVCs      Current Outpatient Medications   Medication Sig Dispense Refill    lisinopril-hydroCHLOROthiazide (PRINZIDE, ZESTORETIC) 20-25 mg per tablet TAKE 1 TABLET BY MOUTH  DAILY 90 Tab 1    estradioL (CLIMARA) 0.1 mg/24 hr APPLY 1 PATCH EVERY 7 DAYS 12 Patch 3    fluticasone propionate (FLONASE) 50 mcg/actuation nasal spray 2 sprays in each nostril daily 1 Bottle 5    bimatoprost (LUMIGAN) 0.03 % ophthalmic drops Administer 1 Drop to both eyes every evening.  DORZOLAMIDE HCL/TIMOLOL MALEAT (DORZOLAMIDE-TIMOLOL OP) Apply  to eye.  CALCIUM CARBONATE/VITAMIN D3 (CALCIUM WITH VITAMIN D PO) Take  by mouth.        Visit Vitals  /78   Pulse 72   Temp 98.1 °F (36.7 °C) (Temporal)   Resp 14   Ht 5' 1\" (1.549 m)   Wt 117 lb (53.1 kg)   BMI 22.11 kg/m²     Carotids are 2+ without bruits. Lungs are clear to percussion. Good breath sounds with no wheezing or crackles. Heart reveals a regular rhythm with normal S1 and S2 no murmur gallop click or rub. Apical impulse is not palpable. Abdomen is soft and nontender with no hepatosplenomegaly or masses and no bruits. Extremities reveal no clubbing cyanosis or edema. Pulses are 2+. Straight leg raising is mildly abnormal on the right side only. She feels pain in the lumbar area and into the buttocks but not below that level. Assessment: #1. Hypertension is adequately controlled, she will continue lisinopril with hydrochlorothiazide, 20-25, 1 daily. #2.  Menopausal symptoms doing well, she prefers to stay on estradiol despite small risk of increasing risk of breast cancer. #3.  Impaired fasting glucose, she would like to see Dr. Elizabeth Clay so we will plan on having her seen in the spring with lab which I will order now. #4. Hyperlipidemia, she prefers to stick with diet alone. #5.  Right-sided sciatica but not nearly as bad as it has been a couple of times in the past once in [facility] and once in the [place] where she has had to use dexamethasone and Norco.  She will continue using Tylenol for her much milder pain present currently and if she gets a lot worse she may use dexamethasone and Norco which I will refill for her. Follow-up in the spring with lab with Dr. Ellen Kawasaki. Perry Malhotra MD FACP    Please note: This document has been produced using voice recognition software. Unrecognized errors in transcription may be present.

## 2020-10-26 ENCOUNTER — OFFICE VISIT (OUTPATIENT)
Dept: INTERNAL MEDICINE CLINIC | Age: 80
End: 2020-10-26
Payer: MEDICARE

## 2020-10-26 DIAGNOSIS — Z23 NEEDS FLU SHOT: Primary | ICD-10-CM

## 2020-10-26 PROCEDURE — 90694 VACC AIIV4 NO PRSRV 0.5ML IM: CPT | Performed by: INTERNAL MEDICINE

## 2020-10-26 NOTE — PROGRESS NOTES
Saida Gray 1940 female who presents for routine immunizations. Patient denies any symptoms , reactions or allergies that would exclude them from being immunized today. Risks and adverse reactions were discussed and the VIS was given to them. All questions were addressed. Order placed for HD Influenza,  per Verbal Order from [place]- provider on call- former PCP retired with read back. Patient was advised to wait 15 min post injection. There were no reactions observed.     Garcia Love LPN

## 2020-10-26 NOTE — PATIENT INSTRUCTIONS
Vaccine Information Statement Influenza (Flu) Vaccine (Inactivated or Recombinant): What You Need to Know Many Vaccine Information Statements are available in Nepali and other languages. See www.immunize.org/vis Hojas de información sobre vacunas están disponibles en español y en muchos otros idiomas. Visite www.immunize.org/vis 1. Why get vaccinated? Influenza vaccine can prevent influenza (flu). Flu is a contagious disease that spreads around the [facility] every year, usually between October and May. Anyone can get the flu, but it is more dangerous for some people. Infants and young children, people 72years of age and older, pregnant women, and people with certain health conditions or a weakened immune system are at greatest risk of flu complications. Pneumonia, bronchitis, sinus infections and ear infections are examples of flu-related complications. If you have a medical condition, such as heart disease, cancer or diabetes, flu can make it worse. Flu can cause fever and chills, sore throat, muscle aches, fatigue, cough, headache, and runny or stuffy nose. Some people may have vomiting and diarrhea, though this is more common in children than adults. Each year thousands of people in the [facility] die from flu, and many more are hospitalized. Flu vaccine prevents millions of illnesses and flu-related visits to the doctor each year. 2. Influenza vaccines CDC recommends everyone 10months of age and older get vaccinated every flu season. Children 6 months through 6years of age may need 2 doses during a single flu season. Everyone else needs only 1 dose each flu season. It takes about 2 weeks for protection to develop after vaccination. There are many flu viruses, and they are always changing. Each year a new flu vaccine is made to protect against three or four viruses that are likely to cause disease in the upcoming flu season.  Even when the vaccine doesnt exactly match these viruses, it may still provide some protection. Influenza vaccine does not cause flu. Influenza vaccine may be given at the same time as other vaccines. 3. Talk with your health care provider Tell your vaccine provider if the person getting the vaccine: 
 Has had an allergic reaction after a previous dose of influenza vaccine, or has any severe, life-threatening allergies.  Has ever had Guillain-Barré Syndrome (also called GBS). In some cases, your health care provider may decide to postpone influenza vaccination to a future visit. People with minor illnesses, such as a cold, may be vaccinated. People who are moderately or severely ill should usually wait until they recover before getting influenza vaccine. Your health care provider can give you more information. 4. Risks of a reaction  Soreness, redness, and swelling where shot is given, fever, muscle aches, and headache can happen after influenza vaccine.  There may be a very small increased risk of Guillain-Barré Syndrome (GBS) after inactivated influenza vaccine (the flu shot). [facility] Current children who get the flu shot along with pneumococcal vaccine (PCV13), and/or DTaP vaccine at the same time might be slightly more likely to have a seizure caused by fever. Tell your health care provider if a child who is getting flu vaccine has ever had a seizure. People sometimes faint after medical procedures, including vaccination. Tell your provider if you feel dizzy or have vision changes or ringing in the ears. As with any medicine, there is a very remote chance of a vaccine causing a severe allergic reaction, other serious injury, or death. 5. What if there is a serious problem? An allergic reaction could occur after the vaccinated person leaves the clinic.  If you see signs of a severe allergic reaction (hives, swelling of the face and throat, difficulty breathing, a fast heartbeat, dizziness, or weakness), call 9-1-1 and get the person to the nearest hospital. 
 
For other signs that concern you, call your health care provider. Adverse reactions should be reported to the Vaccine Adverse Event Reporting System (VAERS). Your health care provider will usually file this report, or you can do it yourself. Visit the VAERS website at www.vaers. hhs.gov or call 4-297.314.7163. VAERS is only for reporting reactions, and VAERS staff do not give medical advice. 6. The National Vaccine Injury Compensation Program 
 
The [facility] Vaccine Injury Compensation Program (VICP) is a federal program that was created to compensate people who may have been injured by certain vaccines. Visit the VICP website at www.[facility].gov/vaccinecompensation or call 2-771.363.5092 to learn about the program and about filing a claim. There is a time limit to file a claim for compensation. 7. How can I learn more?  Ask your health care provider.  Call your local or state health department.  Contact the Centers for Disease Control and Prevention (CDC): 
- Call 7-550.621.7774 (1-800-CDC-INFO) or 
- Visit CDCs influenza website at www.cdc.gov/flu Vaccine Information Statement (Interim) Inactivated Influenza Vaccine 8/15/2019 
[address] PAOL[address] Cardenas [id number]-27 Department of Health [employer] Centers for Disease Control and Prevention Office Use Only

## 2020-11-03 RX ORDER — LISINOPRIL AND HYDROCHLOROTHIAZIDE 20; 25 MG/1; MG/1
TABLET ORAL
Qty: 90 TAB | Refills: 1 | Status: SHIPPED | OUTPATIENT
Start: 2020-11-03 | End: 2021-03-29 | Stop reason: SDUPTHER

## 2020-11-03 NOTE — TELEPHONE ENCOUNTER
Gurvinder Rivera is requesting a 90 day supply  Previous Rx was sent to [facility]    Last Visit: 7/24/20 with MD Jamilah Urias  Next Appointment: 3/22/21 with MD Lukasz Crowder  Previous Refill Encounter(s): 7/24/20 #90 with 3 refills    Requested Prescriptions     Pending Prescriptions Disp Refills    lisinopril-hydroCHLOROthiazide (PRINZIDE, ZESTORETIC) 20-25 mg per tablet 90 Tab 1     Sig: TAKE 1 TABLET BY MOUTH  DAILY

## 2021-02-02 ENCOUNTER — TELEPHONE (OUTPATIENT)
Dept: INTERNAL MEDICINE CLINIC | Age: 81
End: 2021-02-02

## 2021-02-02 NOTE — TELEPHONE ENCOUNTER
Pt c/o a little SOB off & on. Helen Haney it has been going on for a little while. No other symptoms she said other than \"burping. \"  Very vague about her symptoms. . said she received her first Covid vaccine yesterday, will get second one on 2/23/21. Scheduled to see Dr Junior Willard in March. Please advise her at [phone] () or cell ([phone]).

## 2021-02-02 NOTE — TELEPHONE ENCOUNTER
Patient reached, she states that the sob has been going on for months intermittently when she walks up the stairs. Patient denies any chest pain, dizziness, or blurred vision. Patient advised to go to the ER if she experiences any of these symptoms. Patient requested that her appointment be changed to February. Patients apt rescheduled as requested.

## 2021-02-03 DIAGNOSIS — I10 PRIMARY HYPERTENSION: ICD-10-CM

## 2021-02-03 DIAGNOSIS — R73.01 IMPAIRED FASTING GLUCOSE: ICD-10-CM

## 2021-02-03 DIAGNOSIS — E78.5 HYPERLIPIDEMIA LDL GOAL <130: Primary | ICD-10-CM

## 2021-02-17 ENCOUNTER — HOSPITAL ENCOUNTER (OUTPATIENT)
Dept: LAB | Age: 81
Discharge: HOME | End: 2021-02-17
Payer: MEDICARE

## 2021-02-17 ENCOUNTER — APPOINTMENT (OUTPATIENT)
Dept: INTERNAL MEDICINE CLINIC | Age: 81
End: 2021-02-17

## 2021-02-17 DIAGNOSIS — I10 PRIMARY HYPERTENSION: ICD-10-CM

## 2021-02-17 DIAGNOSIS — E78.5 HYPERLIPIDEMIA LDL GOAL <130: ICD-10-CM

## 2021-02-17 DIAGNOSIS — R73.01 IMPAIRED FASTING GLUCOSE: ICD-10-CM

## 2021-02-17 LAB
ALBUMIN SERPL-MCNC: 3.9 G/DL (ref 3.4–5)
ALBUMIN/GLOB SERPL: 1.3 {RATIO} (ref 0.8–1.7)
ALP SERPL-CCNC: 47 U/L (ref 45–117)
ALT SERPL-CCNC: 16 U/L (ref 13–56)
ANION GAP SERPL CALC-SCNC: 9 MMOL/L (ref 3–18)
AST SERPL-CCNC: 16 U/L (ref 10–38)
BASOPHILS # BLD: 0.1 K/UL (ref 0–0.1)
BASOPHILS NFR BLD: 1 % (ref 0–2)
BILIRUB SERPL-MCNC: 0.5 MG/DL (ref 0.2–1)
BUN SERPL-MCNC: 11 MG/DL (ref 7–18)
BUN/CREAT SERPL: 16 (ref 12–20)
CALCIUM SERPL-MCNC: 8.4 MG/DL (ref 8.5–10.1)
CHLORIDE SERPL-SCNC: 95 MMOL/L (ref 100–111)
CHOLEST SERPL-MCNC: 244 MG/DL
CO2 SERPL-SCNC: 27 MMOL/L (ref 21–32)
CREAT SERPL-MCNC: 0.67 MG/DL (ref 0.6–1.3)
CREAT UR-MCNC: 111 MG/DL (ref 30–125)
DIFFERENTIAL METHOD BLD: ABNORMAL
EOSINOPHIL # BLD: 0.2 K/UL (ref 0–0.4)
EOSINOPHIL NFR BLD: 3 % (ref 0–5)
ERYTHROCYTE [DISTWIDTH] IN BLOOD BY AUTOMATED COUNT: 12.6 % (ref 11.6–14.5)
GLOBULIN SER CALC-MCNC: 3.1 G/DL (ref 2–4)
GLUCOSE SERPL-MCNC: 115 MG/DL (ref 74–99)
HBA1C MFR BLD: 5.9 % (ref 4.2–5.6)
HCT VFR BLD AUTO: 35.4 % (ref 35–45)
HDLC SERPL-MCNC: 61 MG/DL (ref 40–60)
HDLC SERPL: 4 {RATIO} (ref 0–5)
HGB BLD-MCNC: 12.4 G/DL (ref 12–16)
LDLC SERPL CALC-MCNC: 164 MG/DL (ref 0–100)
LIPID PROFILE,FLP: ABNORMAL
LYMPHOCYTES # BLD: 1.5 K/UL (ref 0.9–3.6)
LYMPHOCYTES NFR BLD: 23 % (ref 21–52)
MCH RBC QN AUTO: 31.3 PG (ref 24–34)
MCHC RBC AUTO-ENTMCNC: 35 G/DL (ref 31–37)
MCV RBC AUTO: 89.4 FL (ref 74–97)
MICROALBUMIN UR-MCNC: 9.27 MG/DL (ref 0–3)
MICROALBUMIN/CREAT UR-RTO: 84 MG/G (ref 0–30)
MONOCYTES # BLD: 0.8 K/UL (ref 0.05–1.2)
MONOCYTES NFR BLD: 12 % (ref 3–10)
NEUTS SEG # BLD: 4 K/UL (ref 1.8–8)
NEUTS SEG NFR BLD: 61 % (ref 40–73)
PLATELET # BLD AUTO: 299 K/UL (ref 135–420)
PMV BLD AUTO: 11 FL (ref 9.2–11.8)
POTASSIUM SERPL-SCNC: 3.6 MMOL/L (ref 3.5–5.5)
PROT SERPL-MCNC: 7 G/DL (ref 6.4–8.2)
RBC # BLD AUTO: 3.96 M/UL (ref 4.2–5.3)
SODIUM SERPL-SCNC: 131 MMOL/L (ref 136–145)
TRIGL SERPL-MCNC: 95 MG/DL (ref ?–150)
VLDLC SERPL CALC-MCNC: 19 MG/DL
WBC # BLD AUTO: 6.6 K/UL (ref 4.6–13.2)

## 2021-02-17 PROCEDURE — 80053 COMPREHEN METABOLIC PANEL: CPT

## 2021-02-17 PROCEDURE — 83036 HEMOGLOBIN GLYCOSYLATED A1C: CPT

## 2021-02-17 PROCEDURE — 85025 COMPLETE CBC W/AUTO DIFF WBC: CPT

## 2021-02-17 PROCEDURE — 80061 LIPID PANEL: CPT

## 2021-02-17 PROCEDURE — 36415 COLL VENOUS BLD VENIPUNCTURE: CPT

## 2021-02-17 PROCEDURE — 82043 UR ALBUMIN QUANTITATIVE: CPT

## 2021-02-18 NOTE — PROGRESS NOTES
Rodrigo Zhao presents today for   Chief Complaint   Patient presents with   [address]    Hypertension    Cholesterol Problem    Labs     2-3-21              Coordination of Care:  1. Have you been to the ER, urgent care clinic since your last visit? Hospitalized since your last visit? no    2. Have you seen or consulted any other health care providers outside of the [address] [place] [address] since your last visit? Include any pap smears or colon screening.  no

## 2021-02-19 ENCOUNTER — HOSPITAL ENCOUNTER (OUTPATIENT)
Dept: GENERAL RADIOLOGY | Age: 81
Discharge: HOME | End: 2021-02-19
Payer: MEDICARE

## 2021-02-19 ENCOUNTER — OFFICE VISIT (OUTPATIENT)
Dept: INTERNAL MEDICINE CLINIC | Age: 81
End: 2021-02-19
Payer: MEDICARE

## 2021-02-19 ENCOUNTER — HOSPITAL ENCOUNTER (OUTPATIENT)
Dept: LAB | Age: 81
Discharge: HOME | End: 2021-02-19
Payer: MEDICARE

## 2021-02-19 VITALS
HEART RATE: 62 BPM | RESPIRATION RATE: 12 BRPM | WEIGHT: 114 LBS | HEIGHT: 61 IN | SYSTOLIC BLOOD PRESSURE: 177 MMHG | OXYGEN SATURATION: 100 % | DIASTOLIC BLOOD PRESSURE: 71 MMHG | BODY MASS INDEX: 21.52 KG/M2 | TEMPERATURE: 97.4 F

## 2021-02-19 DIAGNOSIS — K59.00 CONSTIPATION, UNSPECIFIED CONSTIPATION TYPE: ICD-10-CM

## 2021-02-19 DIAGNOSIS — R06.09 DOE (DYSPNEA ON EXERTION): ICD-10-CM

## 2021-02-19 DIAGNOSIS — E78.5 HYPERLIPIDEMIA LDL GOAL <130: Primary | ICD-10-CM

## 2021-02-19 DIAGNOSIS — Z00.00 MEDICARE ANNUAL WELLNESS VISIT, SUBSEQUENT: ICD-10-CM

## 2021-02-19 DIAGNOSIS — I10 PRIMARY HYPERTENSION: ICD-10-CM

## 2021-02-19 DIAGNOSIS — R73.01 IMPAIRED FASTING GLUCOSE: ICD-10-CM

## 2021-02-19 DIAGNOSIS — H40.9 GLAUCOMA OF LEFT EYE, UNSPECIFIED GLAUCOMA TYPE: ICD-10-CM

## 2021-02-19 DIAGNOSIS — Z71.89 ADVANCE CARE PLANNING: ICD-10-CM

## 2021-02-19 DIAGNOSIS — E83.51 HYPOCALCEMIA: ICD-10-CM

## 2021-02-19 DIAGNOSIS — R73.9 HYPERGLYCEMIA: ICD-10-CM

## 2021-02-19 DIAGNOSIS — N95.1 MENOPAUSAL SYMPTOM: ICD-10-CM

## 2021-02-19 PROCEDURE — G8510 SCR DEP NEG, NO PLAN REQD: HCPCS | Performed by: INTERNAL MEDICINE

## 2021-02-19 PROCEDURE — G8536 NO DOC ELDER MAL SCRN: HCPCS | Performed by: INTERNAL MEDICINE

## 2021-02-19 PROCEDURE — 99214 OFFICE O/P EST MOD 30 MIN: CPT | Performed by: INTERNAL MEDICINE

## 2021-02-19 PROCEDURE — G0439 PPPS, SUBSEQ VISIT: HCPCS | Performed by: INTERNAL MEDICINE

## 2021-02-19 PROCEDURE — G8399 PT W/DXA RESULTS DOCUMENT: HCPCS | Performed by: INTERNAL MEDICINE

## 2021-02-19 PROCEDURE — 93005 ELECTROCARDIOGRAM TRACING: CPT

## 2021-02-19 PROCEDURE — G8427 DOCREV CUR MEDS BY ELIG CLIN: HCPCS | Performed by: INTERNAL MEDICINE

## 2021-02-19 PROCEDURE — G8420 CALC BMI NORM PARAMETERS: HCPCS | Performed by: INTERNAL MEDICINE

## 2021-02-19 PROCEDURE — 71046 X-RAY EXAM CHEST 2 VIEWS: CPT

## 2021-02-19 PROCEDURE — G0463 HOSPITAL OUTPT CLINIC VISIT: HCPCS | Performed by: INTERNAL MEDICINE

## 2021-02-19 PROCEDURE — 1101F PT FALLS ASSESS-DOCD LE1/YR: CPT | Performed by: INTERNAL MEDICINE

## 2021-02-19 PROCEDURE — G8753 SYS BP > OR = 140: HCPCS | Performed by: INTERNAL MEDICINE

## 2021-02-19 PROCEDURE — G8754 DIAS BP LESS 90: HCPCS | Performed by: INTERNAL MEDICINE

## 2021-02-19 PROCEDURE — 1090F PRES/ABSN URINE INCON ASSESS: CPT | Performed by: INTERNAL MEDICINE

## 2021-02-19 RX ORDER — PRAVASTATIN SODIUM 40 MG/1
40 TABLET ORAL
Qty: 30 TAB | Refills: 5 | Status: SHIPPED | COMMUNITY
Start: 2021-02-19 | End: 2021-02-23

## 2021-02-19 RX ORDER — LANOLIN ALCOHOL/MO/W.PET/CERES
1000 CREAM (GRAM) TOPICAL DAILY
COMMUNITY

## 2021-02-19 NOTE — PROGRESS NOTES
INTERNISTS OF [facility]:  2021, MRN: [id number]      Ya Silva is a 79 y.o. female and presents to clinic to Ame[facility], Hypertension, Cholesterol Problem, and Labs (2-3-21)    Subjective: The pt is a 80 yo female with h/o prediabetes, HLD, eczema per EHR, OA, and glaucoma. 1. Left Eye Glaucoma: Last eye exam: w/i the past year. She wears reading glasses. Using lumigan drops and dozolamide-timolol. She has a follow up apt with  and . +Blurry vision - but not worsening. Vision is worse at night. 2. Menopausal Sx: On climara. Today she reports: having taken hormone replacement rx x 40 yrs since her hysterectomy in her 45s. Her hysterectomy was for heavy periods per pt hx. She tries not to use her estradiol patch. 3. HTN: BP is elevated today. Taking Toprol-HCTZ. No adverse effects. Her most recent labs show normal renal function. Home Bps are Wnl.     4. Prediabetes: Her recent labs show: Her A1C is down from 6.1 to 5.9.     5. HLD: LDL is in the 160s per her recent labs. She is not on a cholesterol lowering rx. +Family hx of HLD and DM. 6. Health Maintenance:  - Mammogram: Done and WNL - in 2020  - Left ear pops off/on. No ear pain or hearing loss. - She had the shingles vaccination - zostavax  - She has her 2nd covid vaccine later this month  - Her father  of an aneurysm    9. JOHNSON: She has some anxiety. Sx of JOHNSON have been present x 2-3 months. Sx are worsening. 8. Constipation: She has hard stools off/on. No bleeding. No abdominal pain. She tries to drink water throughout the day. She drinks 2 bottles of water per day. 9. Hypocalcemia: She is taking a MV daily. Her recent labs show a level of 8.4. She takes B12 and vitamin D as well.          Patient Active Problem List    Diagnosis Date Noted    Impaired fasting glucose 2018    Raynaud's disease without gangrene 05/15/2017    Hyperlipidemia LDL goal <130 2016    Advance directive discussed with patient 03/23/2016    Primary hypertension 04/20/2015    Generalized osteoarthrosis, involving multiple sites 05/23/2012    Eczema     Family history of diabetes mellitus (DM)     Glaucoma        Current Outpatient Medications   Medication Sig Dispense Refill    cyanocobalamin (Vitamin B-12) 1,000 mcg tablet Take 1,000 mcg by mouth daily.  folic acid/multivit-min/lutein (CENTRUM SILVER PO) Take  by mouth.  lisinopril-hydroCHLOROthiazide (PRINZIDE, ZESTORETIC) 20-25 mg per tablet TAKE 1 TABLET BY MOUTH  DAILY 90 Tab 1    estradioL (CLIMARA) 0.1 mg/24 hr APPLY 1 PATCH EVERY 7 DAYS 12 Patch 3    bimatoprost (LUMIGAN) 0.03 % ophthalmic drops Administer 1 Drop to both eyes every evening.  DORZOLAMIDE HCL/TIMOLOL MALEAT (DORZOLAMIDE-TIMOLOL OP) Apply  to eye.  fluticasone propionate (FLONASE) 50 mcg/actuation nasal spray 2 sprays in each nostril daily 1 Bottle 5    CALCIUM CARBONATE/VITAMIN D3 (CALCIUM WITH VITAMIN D PO) Take  by mouth. No Known Allergies    Past Medical History:   Diagnosis Date    Diverticulosis of sigmoid colon 11/13/15    mild; Dr. España Ip Eczema     Elevated cholesterol     Family history of diabetes mellitus (DM)     Glaucoma     Hyperlipidemia     Hypertension     Indigestion     Interpolated PVCs        Past Surgical History:   Procedure Laterality Date    HX BREAST BIOPSY      HX HYSTERECTOMY  1981    STEPHAN/BSO    HX OOPHORECTOMY      right       Family History   Problem Relation Age of Onset   [facility] Glaucoma Mother     Other Father         cerebral aneurysm    Hypertension Sister     Diabetes Sister     Hypertension Sister     Diabetes Paternal Uncle        Social History     Tobacco Use    Smoking status: Never Smoker    Smokeless tobacco: Never Used   Substance Use Topics    Alcohol use: No     Comment: with dinner       ROS   Review of Systems   Constitutional: Negative for chills and fever.    HENT: Negative for ear pain and sore throat. Eyes: Positive for blurred vision. Negative for pain. Respiratory: Positive for shortness of breath (JOHNSON). Cardiovascular: Negative for chest pain. Gastrointestinal: Positive for constipation. Negative for abdominal pain, blood in stool and melena. Genitourinary: Negative for dysuria and hematuria. Musculoskeletal: Negative for joint pain and myalgias. Skin: Negative for rash. Neurological: Negative for tingling, focal weakness and headaches. Endo/Heme/Allergies: Does not bruise/bleed easily. Psychiatric/Behavioral: Negative for substance abuse. Objective     Vitals:    02/19/21 1403   Resp: 12   Temp: 97.4 °F (36.3 °C)   TempSrc: Temporal   Weight: 114 lb (51.7 kg)   Height: 5' 1\" (1.549 m)   PainSc:   2   PainLoc: Back       Physical Exam  Vitals signs and nursing note reviewed. HENT:      Head: Normocephalic and atraumatic. Right Ear: External ear normal.      Left Ear: External ear normal.   Eyes:      General: No scleral icterus. Right eye: No discharge. Left eye: No discharge. Conjunctiva/sclera: Conjunctivae normal.   Neck:      Musculoskeletal: Neck supple. Cardiovascular:      Rate and Rhythm: Normal rate and regular rhythm. Heart sounds: Normal heart sounds. No murmur. No friction rub. No gallop. Pulmonary:      Effort: Pulmonary effort is normal. No respiratory distress. Breath sounds: Normal breath sounds. No wheezing or rales. Abdominal:      General: Bowel sounds are normal. There is no distension. Palpations: Abdomen is soft. There is no mass. Tenderness: There is no abdominal tenderness. There is no guarding or rebound. Musculoskeletal:         General: No tenderness (B). Lymphadenopathy:      Cervical: No cervical adenopathy. Skin:     General: Skin is warm and dry. Findings: No erythema. Neurological:      Mental Status: She is alert and oriented to person, place, and time. Motor: No abnormal muscle tone. Gait: Gait is intact. Gait normal.   Psychiatric:         Mood and Affect: Mood and affect normal.         LABS   Data Review:   Lab Results   Component Value Date/Time    WBC 6.6 02/17/2021 10:42 AM    WBC 6.8 05/16/2012 09:03 AM    HGB 12.4 02/17/2021 10:42 AM    HCT 35.4 02/17/2021 10:42 AM    PLATELET 785 70/07/1414 10:42 AM    MCV 89.4 02/17/2021 10:42 AM       Lab Results   Component Value Date/Time    Sodium 131 (L) 02/17/2021 10:42 AM    Potassium 3.6 02/17/2021 10:42 AM    Chloride 95 (L) 02/17/2021 10:42 AM    CO2 27 02/17/2021 10:42 AM    Anion gap 9 02/17/2021 10:42 AM    Glucose 115 (H) 02/17/2021 10:42 AM    BUN 11 02/17/2021 10:42 AM    Creatinine 0.67 02/17/2021 10:42 AM    BUN/Creatinine ratio 16 02/17/2021 10:42 AM    GFR est AA >60 02/17/2021 10:42 AM    GFR est non-AA >60 02/17/2021 10:42 AM    Calcium 8.4 (L) 02/17/2021 10:42 AM       Lab Results   Component Value Date/Time    Cholesterol, total 244 (H) 02/17/2021 10:42 AM    HDL Cholesterol 61 (H) 02/17/2021 10:42 AM    LDL, calculated 164 (H) 02/17/2021 10:42 AM    VLDL, calculated 19 02/17/2021 10:42 AM    Triglyceride 95 02/17/2021 10:42 AM    CHOL/HDL Ratio 4.0 02/17/2021 10:42 AM       Lab Results   Component Value Date/Time    Hemoglobin A1c 5.9 (H) 02/17/2021 10:42 AM       Assessment/Plan:   1. JOHNSON (dyspnea on exertion): Etiology is unknown.  -Checking a chest x-ray and EKG. ORDERS:  - XR CHEST PA LAT; Future  - EKG, 12 LEAD, INITIAL; Future    2. Hyperlipidemia LDL goal <130  -We discussed the addition of taking pravastatin. We will have her take this medication and recheck her lipid panel later this year. 3. Prediabetes: Her A1c is 5.9.  -I encouraged her to reduce her carbs. -I will check an A1c later this year. 4.  Hypocalcemia: Mild.  -I encouraged her to take a multivitamin over-the-counter.     5.  Menopausal symptoms:  -We discussed the risk and benefits associated with continued hormone therapy for menopausal symptoms. She will try not to use the estradiol rx. I will follow-up with her on this at her follow-up appointment. 6.  Hypertension: Blood pressure is elevated. Likely falsely elevated due to whitecoat hypertension. Home BPs are reassuring.  -I encouraged her to continue with home blood pressure checks.  -Continue with Rx as prescribed. -Return to clinic for BP check. 7.  Constipation:  -I encouraged her to stay hydrated, drinking water throughout the day. -I encouraged her to maintain a high-fiber diet.  -If her constipation does not improve with these interventions, we discussed the need to see a GI doctor for a colonoscopy. For now though, we will proceed with lifestyle modification measures, which may or may not include use of over-the-counter laxatives. 8. Glaucoma:  -Continue with Rx per Ophthalmology recommendations. Follow-up with Ophthalmology. Health Maintenance Due   Topic Date Due    Hepatitis C Screening  1940    COVID-19 Vaccine (1 of 2) 07/15/1957    Shingrix Vaccine Age 50> (1 of 2) 07/15/1991    Pneumococcal 65+ years (2 of 2 - PPSV23) 02/21/2019    Medicare Yearly Exam  12/05/2019    GLAUCOMA SCREENING Q2Y  12/03/2020         Lab review: labs are reviewed in the EHR and ordered as mentioned above    I have discussed the diagnosis with the patient and the intended plan as seen in the above orders. The patient has received an after-visit summary and questions were answered concerning future plans. I have discussed medication side effects and warnings with the patient as well. I have reviewed the plan of care with the patient, accepted their input and they are in agreement with the treatment goals. All questions were answered. The patient understands the plan of care. Handouts provided today with above information. Pt instructed if symptoms worsen to call the office or report to the ED for continued care.   Greater than 50% of the visit time was spent in counseling and/or coordination of care. Voice recognition was used to generate this report, which may have resulted in some phonetic based errors in grammar and contents. Even though attempts were made to correct all the mistakes, some may have been missed, and remained in the body of the document.           Cassandra Seth MD

## 2021-02-19 NOTE — PATIENT INSTRUCTIONS
. 
Medicare Part B Preventive Services Limitations Recommendation Scheduled Bone Mass Measurement 
(age 72 & older, biennial) Requires diagnosis related to osteoporosis or estrogen deficiency. Biennial benefit unless patient has history of long-term glucocorticoid tx or baseline is needed because initial test was by other method This should be done at age 72 and again if on osteoporosis medication at 2-3 year intervals. Up to date Cardiovascular Screening Blood Tests (every 5 years) Total cholesterol, HDL, Triglycerides Order as a panel if possible We should check your cholesterol panel at least once every 5 years. Up to date Colorectal Cancer Screening 
-Fecal occult blood test (annual) -Flexible sigmoidoscopy (5y) 
-Screening colonoscopy (10y) -Barium Enema  Due per your Gastroenterologist's recommendations. Up to date Counseling to Prevent Tobacco Use (up to 8 sessions per year) - Counseling greater than 3 and up to 10 minutes - Counseling greater than 10 minutes Patients must be asymptomatic of tobacco-related conditions to receive as preventive service Continue with smoking cessation Not applicable Diabetes Screening Tests (at least every 3 years, Medicare covers annually or at 6-month intervals for prediabetic patients) Fasting blood sugar (FBS) or glucose tolerance test (GTT) Patient must be diagnosed with one of the following: 
-Hypertension, Dyslipidemia, obesity, previous impaired FBS or GTT 
Or any two of the following: overweight, FH of diabetes, age ? 72, history of gestational diabetes, birth of baby weighing more than 9 pounds Should be done yearly Up to date Diabetes Self-Management Training (DSMT) (no USPSTF recommendation) Requires referral by treating physician for patient with diabetes or renal disease. 10 hours of initial DSMT session of no less than 30 minutes each in a continuous 12-month period. 2 hours of follow-up DSMT in subsequent years. Not applicable Not applicable Glaucoma Screening (no USPSTF recommendation) Diabetes mellitus, family history, , age 48 or over,  American, age 72 or over Continue with annual eye exams. Overdue per our records Human Immunodeficiency Virus (HIV) Screening (annually for increased risk patients) HIV-1 and HIV-2 by EIA, ISABELA, rapid antibody test, or oral mucosa transudate Patient must be at increased risk for HIV infection per USPSTF guidelines or pregnant. Tests covered annually for patients at increased risk. Pregnant patients may receive up to 3 test during pregnancy. Not applicable Not applicable Medical Nutrition Therapy (MNT) (for diabetes or renal disease not recommended schedule) Requires referral by treating physician for patient with diabetes or renal disease. Can be provided in same year as diabetes self-management training (DSMT), and CMS recommends medical nutrition therapy take place after DSMT. Up to 3 hours for initial year and 2 hours in subsequent years. Not applicable Not applicable Shingles Vaccination A shingles vaccine is also recommended once in a lifetime after age 61 Vaccination recommended for shingles vaccination. Overdue per our records Seasonal Influenza Vaccination (annually)  Continue with yearly \"flu\" shot annually Up to date Pneumococcal Vaccination (once after 65)  Please receive this vaccination at age 72. Overdue per our records Hepatitis B Vaccinations (if medium/high risk) Medium/high risk factors:  End-stage renal disease, 
 Hemophiliacs who received Factor VIII or IX concentrates, Clients of institutions for the mentally retarded, Persons who live in the same house as a HepB virus carrier, Homosexual men, Illicit injectable drug abusers. Not applicable Not applicable Screening Mammography (biennial age 54-69) Annually (age 36 or over) You need a mammogram yearly to screen for breast cancer. Up to date Screening Pap Tests and Pelvic Examination (up to age 79 and after 79 if unknown history or abnormal study last 10 years) Every 24 months except high risk You need no Pap smear at this time. Not needed at this time. Ultrasound Screening for Abdominal Aortic Aneurysm (AAA) (once) Patient must be referred through IPPE and not have had a screening for abdominal aortic aneurysm before under Medicare. Limited to patients who meet one of the following criteria: 
- Men who are 73-68 years old and have smoked more than 100 cigarettes in their lifetime. 
-Anyone with a FH of AAA 
-Anyone recommended for screening by USPSTF Not applicable Not applicable Advance Directives: Care Instructions Overview An advance directive is a legal way to state your wishes at the end of your life. It tells your family and your doctor what to do if you can't say what you want. There are two main types of advance directives. You can change them any time your wishes change. Living will. This form tells your family and your doctor your wishes about life support and other treatment. The form is also called a declaration. Medical power of . This form lets you name a person to make treatment decisions for you when you can't speak for yourself. This person is called a health care agent (health care proxy, health care surrogate). The form is also called a durable power of  for health care. If you do not have an advance directive, decisions about your medical care may be made by a family member, or by a doctor or a  who doesn't know you. It may help to think of an advance directive as a gift to the people who care for you. If you have one, they won't have to make tough decisions by themselves. Follow-up care is a key part of your treatment and safety. Be sure to make and go to all appointments, and call your doctor if you are having problems. It's also a good idea to know your test results and keep a list of the medicines you take. What should you include in an advance directive? Many states have a unique advance directive form. (It may ask you to address specific issues.) Or you might use a universal form that's approved by many states. If your form doesn't tell you what to address, it may be hard to know what to include in your advance directive. Use the questions below to help you get started. · Who do you want to make decisions about your medical care if you are not able to? · What life-support measures do you want if you have a serious illness that gets worse over time or can't be cured? · What are you most afraid of that might happen? (Maybe you're afraid of having pain, losing your independence, or being kept alive by machines.) · Where would you prefer to die? (Your home? A hospital? A nursing home?) · Do you want to donate your organs when you die? · Do you want certain Baptism practices performed before you die? When should you call for help? Be sure to contact your doctor if you have any questions. Where can you learn more? Go to http://www.gray.com/ Enter R264 in the search box to learn more about \"Advance Directives: Care Instructions. \" Current as of: December 9, 2019               Content Version: 12.6 © 9165-0807 [employer]. Care instructions adapted under license by [employer] (which disclaims liability or warranty for this information). If you have questions about a medical condition or this instruction, always ask your healthcare professional. [address] any warranty or liability for your use of this information. Well Visit, Over 72: Care Instructions Your Care Instructions Physical exams can help you stay healthy. Your doctor has checked your overall health and may have suggested ways to take good care of yourself. He or she also may have recommended tests. At home, you can help prevent illness with healthy eating, regular exercise, and other steps. Follow-up care is a key part of your treatment and safety. Be sure to make and go to all appointments, and call your doctor if you are having problems. It's also a good idea to know your test results and keep a list of the medicines you take. How can you care for yourself at home? · Reach and stay at a healthy weight. This will lower your risk for many problems, such as obesity, diabetes, heart disease, and high blood pressure. · Get at least 30 minutes of exercise on most days of the week. Walking is a good choice. You also may want to do other activities, such as running, swimming, cycling, or playing tennis or team sports. · Do not smoke. Smoking can make health problems worse. If you need help quitting, talk to your doctor about stop-smoking programs and medicines. These can increase your chances of quitting for good. · Protect your skin from too much sun. When you're outdoors from 10 a.m. to 4 p.m., stay in the shade or cover up with clothing and a hat with a wide brim. Wear sunglasses that block UV rays. Even when it's cloudy, put broad-spectrum sunscreen (SPF 30 or higher) on any exposed skin. · See a dentist one or two times a year for checkups and to have your teeth cleaned. · Wear a seat belt in the car. Follow your doctor's advice about when to have certain tests. These tests can spot problems early. For men and women · Cholesterol. Your doctor will tell you how often to have this done based on your overall health and other things that can increase your risk for heart attack and stroke. · Blood pressure. Have your blood pressure checked during a routine doctor visit. Your doctor will tell you how often to check your blood pressure based on your age, your blood pressure results, and other factors. · Diabetes. Ask your doctor whether you should have tests for diabetes. · Vision. Experts recommend that you have yearly exams for glaucoma and other age-related eye problems. · Hearing. Tell your doctor if you notice any change in your hearing. You can have tests to find out how well you hear. · Colon cancer tests. Keep having colon cancer tests as your doctor recommends. You can have one of several types of tests. · Heart attack and stroke risk. At least every 4 to 6 years, you should have your risk for heart attack and stroke assessed. Your doctor uses factors such as your age, blood pressure, cholesterol, and whether you smoke or have diabetes to show what your risk for a heart attack or stroke is over the next 10 years. · Osteoporosis. Talk to your doctor about whether you should have a bone density test to find out whether you have thinning bones. Ask your doctor if you need to take a calcium plus vitamin D supplement. You may be able to get enough calcium and vitamin D through your diet. For women · Pap test and pelvic exam. You may no longer need a Pap test. Talk with your doctor about whether to stop or continue to have Pap tests. · Breast exam and mammogram. Ask how often you should have a mammogram, which is an X-ray of your breasts. A mammogram can spot breast cancer before it can be felt and when it is easiest to treat. · Thyroid disease. Talk to your doctor about whether to have your thyroid checked as part of a regular physical exam. Women have an increased chance of a thyroid problem. For men · Prostate exam. Talk to your doctor about whether you should have a blood test (called a PSA test) for prostate cancer. Experts recommend that you discuss the benefits and risks of the test with your doctor before you decide whether to have this test. Some experts say that men ages 79 and older no longer need testing. · Abdominal aortic aneurysm. Ask your doctor whether you should have a test to check for an aneurysm. You may need a test if you ever smoked or if your parent, brother, sister, or child has had an aneurysm. When should you call for help? Watch closely for changes in your health, and be sure to contact your doctor if you have any problems or symptoms that concern you. Where can you learn more? Go to http://www.gray.com/ Enter U848 in the search box to learn more about \"Well Visit, Over 65: Care Instructions. \" Current as of: May 27, 2020               Content Version: 12.6 © 7885-5751 [employer]. Care instructions adapted under license by [employer] (which disclaims liability or warranty for this information). If you have questions about a medical condition or this instruction, always ask your healthcare professional. [address] any warranty or liability for your use of this information. Statins: Care Instructions Your Care Instructions Statins are medicines that lower your cholesterol and your risk for a heart attack and stroke. Cholesterol is a type of fat in your blood. If you have too much cholesterol, it can build up in blood vessels. This raises your risk of heart disease, heart attack, and stroke. Statins lower cholesterol by blocking how much your body makes. This prevents cholesterol from building up in your blood vessels. This is called hardening of the arteries. It is the starting point for some heart and blood flow problems, such as heart disease. Statins may also reduce inflammation around the buildup (called plaque). This can lower the risk that the plaque will break apart and lead to a heart attack or stroke. A heart-healthy lifestyle is important for lowering your risk whether you take statins or not. This includes eating healthy foods, being active, staying at a healthy weight, and not smoking. You must take statins regularly for them to work well. If you stop, your cholesterol and your risk will go back up. Examples of statins include: · Atorvastatin (Lipitor). · Lovastatin (Mevacor). · Pravastatin (Pravachol). · Simvastatin (Zocor). Statins interact with many medicines. So tell your doctor all of the other medicines that you take. These include prescription medicines, over-the-counter medicines, dietary supplements, and herbal products. Follow-up care is a key part of your treatment and safety. Be sure to make and go to all appointments, and call your doctor if you are having problems. It's also a good idea to know your test results and keep a list of the medicines you take. How can you care for yourself at home? · Take statins exactly as your doctor tells you. High cholesterol has no symptoms. So it is easy to forget to take the pills. Try to make a system that reminds you to take them. · Do not take two or more medicines at the same time unless the doctor told you to. Statins can interact with other medicines. · Always tell your doctor if you think you are having a side effect. If side effects are a problem with one medicine, a different one may be used. · Keep making the lifestyle changes your doctor suggests. Eat heart-healthy foods, be active, don't smoke, and stay at a healthy weight. · Talk to your doctor about avoiding grapefruit juice if you take statins. Grapefruit juice can raise the level of this medicine in your blood. This could increase side effects. When should you call for help? Watch closely for changes in your health, and be sure to contact your doctor if: 
  · You think you are having problems with your medicine.  
  · You have aches or muscle pain. Where can you learn more? Go to http://www.gray.com/ Enter R358 in the search box to learn more about \"Statins: Care Instructions. \" Current as of: December 16, 2019               Content Version: 12.6 © 9250-2566 [employer]. Care instructions adapted under license by [employer] information [employer] (which disclaims liability or warranty for this information). If you have questions about a medical condition or this instruction, always ask your healthcare professional. [place] [address] any warranty or liability for your use of this information. Medicare Wellness Visit, Female The best way to live healthy is to have a lifestyle where you eat a well-balanced diet, exercise regularly, limit alcohol use, and quit all forms of tobacco/nicotine, if applicable. Regular preventive services are another way to keep healthy. Preventive services (vaccines, screening tests, monitoring & exams) can help personalize your care plan, which helps you manage your own care. Screening tests can find health problems at the earliest stages, when they are easiest to treat. [facility] follows the current, evidence-based guidelines published by the [facility] Clint Rubin ([facility]) when recommending preventive services for our patients. Because we follow these guidelines, sometimes recommendations change over time as research supports it. (For example, mammograms used to be recommended annually. Even though Medicare will still pay for an annual mammogram, the newer guidelines recommend a mammogram every two years for women of average risk). Of course, you and your doctor may decide to screen more often for some diseases, based on your risk and your co-morbidities (chronic disease you are already diagnosed with). Preventive services for you include: - Medicare offers their members a free annual wellness visit, which is time for you and your primary care provider to discuss and plan for your preventive service needs. Take advantage of this benefit every year! 
-All adults over the age of 72 should receive the recommended pneumonia vaccines. Current USPSTF guidelines recommend a series of two vaccines for the best pneumonia protection.  
-All adults should have a flu vaccine yearly and a tetanus vaccine every 10 years.  
-All adults age 48 and older should receive the shingles vaccines (series of two vaccines). -All adults age 38-68 who are overweight should have a diabetes screening test once every three years.  
-All adults born between [birth year] and 1965 should be screened once for Hepatitis C. 
-Other screening tests and preventive services for persons with diabetes include: an eye exam to screen for diabetic retinopathy, a kidney function test, a foot exam, and stricter control over your cholesterol.  
-Cardiovascular screening for adults with routine risk involves an electrocardiogram (ECG) at intervals determined by your doctor. -Colorectal cancer screenings should be done for adults age 54-65 with no increased risk factors for colorectal cancer. There are a number of acceptable methods of screening for this type of cancer. Each test has its own benefits and drawbacks. Discuss with your doctor what is most appropriate for you during your annual wellness visit. The different tests include: colonoscopy (considered the best screening method), a fecal occult blood test, a fecal DNA test, and sigmoidoscopy. 
 
-A bone mass density test is recommended when a woman turns 65 to screen for osteoporosis. This test is only recommended one time, as a screening. Some providers will use this same test as a disease monitoring tool if you already have osteoporosis. -Breast cancer screenings are recommended every other year for women of normal risk, age 54-69. 
-Cervical cancer screenings for women over age 72 are only recommended with certain risk factors. Here is a list of your current Health Maintenance items (your personalized list of preventive services) with a due date: 
Health Maintenance Due Topic Date Due  
 Hepatitis C Test  Never done  Shingles Vaccine (1 of 2) Never done  Glaucoma Screening   12/03/2020  COVID-19 Vaccine (2 of 2 - Moderna series) 02/23/2021

## 2021-02-20 LAB
ATRIAL RATE: 56 BPM
CALCULATED P AXIS, ECG09: 47 DEGREES
CALCULATED R AXIS, ECG10: 42 DEGREES
CALCULATED T AXIS, ECG11: 61 DEGREES
DIAGNOSIS, 93000: NORMAL
P-R INTERVAL, ECG05: 174 MS
Q-T INTERVAL, ECG07: 420 MS
QRS DURATION, ECG06: 92 MS
QTC CALCULATION (BEZET), ECG08: 405 MS
VENTRICULAR RATE, ECG03: 56 BPM

## 2021-02-22 DIAGNOSIS — I10 PRIMARY HYPERTENSION: Primary | ICD-10-CM

## 2021-02-22 RX ORDER — AMLODIPINE BESYLATE 2.5 MG/1
2.5 TABLET ORAL DAILY
Qty: 30 TAB | Refills: 5 | Status: SHIPPED | OUTPATIENT
Start: 2021-02-22 | End: 2021-03-29

## 2021-02-22 NOTE — PROGRESS NOTES
Please let her know that her labs show evidence of small amounts of protein in her urine. There is no evidence of chronic kidney disease per her recent labs. Her abnormal urine test could indicate that her blood pressure is not adequately controlled with her present medicine. As result, I am ordering 2.5 mg of amlodipine to take with her present medication regimen. Her sodium was mildly low at 131. Her calcium is mildly low at 8.4. Her LFTs are normal.  Her total cholesterol is 244. Triglycerides are 95. Her HDL is 61. Her LDL is 164. Overall, her cholesterol is unchanged. Her A1c is down to 5.9 from 6.1 a year ago. Her diabetes is just well controlled and does not need medication at this time. In order to control her diabetes, she needs to continue maintaining a low-carb diet, limiting rice/pasta/sweets/potato. Meanwhile, her CBC is unremarkable.       Dr. Vela Rumford  [facility], [address] [place] [address], [address] [place] [address] Str.  Phone: [phone]  Fax: [phone]

## 2021-02-23 ENCOUNTER — TELEPHONE (OUTPATIENT)
Dept: INTERNAL MEDICINE CLINIC | Age: 81
End: 2021-02-23

## 2021-02-23 DIAGNOSIS — E78.5 HYPERLIPIDEMIA LDL GOAL <130: ICD-10-CM

## 2021-02-23 RX ORDER — PRAVASTATIN SODIUM 20 MG/1
20 TABLET ORAL
Qty: 90 TAB | Refills: 3 | Status: SHIPPED | OUTPATIENT
Start: 2021-02-23 | End: 2021-06-10 | Stop reason: SDUPTHER

## 2021-02-23 NOTE — TELEPHONE ENCOUNTER
Please let her know that I reduced the dose to 20mg daily.     Dr. Lorena Dial  [facility], [address] [facility], [address]  Phone: [phone]  Fax: [phone]

## 2021-02-23 NOTE — TELEPHONE ENCOUNTER
Patient reached, and given results. Patient requesting that the pravastatin dosage be reduced, she does not want to take the 40 mg. Please advise.

## 2021-02-23 NOTE — TELEPHONE ENCOUNTER
----- Message from Jana Ponce MD sent at 2/22/2021  4:16 PM EST -----  Please let her know that her EKG shows a normal cardiac rhythm. Her chest x-ray is unremarkable.     Dr. Valentina Banuelos  [facility] [address], [address] [place] [address] Str.  Phone: [phone]  Fax: [phone]

## 2021-03-04 NOTE — ACP (ADVANCE CARE PLANNING)
Advance Care Planning  Advance Care Planning (ACP) Provider Conversation     Date of ACP Conversation: 2/19/21  Persons included in Conversation:  patient    Authorized Decision Maker (if patient is incapable of making informed decisions): This person is:   Next of Kin by law (only applies in absence of a Healthcare Power of  or Legal Guardian)          For Patients with Decision Making Capacity:   Values/Goals: Exploration of values, goals, and preferences if recovery is not expected, even with continued medical treatment in the event of:  Imminent death  Severe, permanent brain injury    Conversation Outcomes / Follow-Up Plan:   ACP in process - information provided, considering goals and options. I encouraged her to complete her advance directive.      Dr. Shamir Escamilla  [facility] [address]  Phone: [phone]  Fax: [phone]

## 2021-03-04 NOTE — PROGRESS NOTES
INTERNISTS OF [facility] [place]:  03/04/21, [id number]      The Subsequent Medicare Annual Wellness Exam PROGRESS NOTE    This is a Subsequent Medicare Annual Wellness Exam (AWV). I have reviewed the patient's medical history in detail and updated the computerized patient record. Pablito Nevarez is a 79 y.o.  female and presents for an annual wellness exam.    SUBJECTIVE    Past Medical History:   Diagnosis Date    Diverticulosis of sigmoid colon 11/13/15    mild; Dr. Danni Bowden Eczema     Elevated cholesterol     Family history of diabetes mellitus (DM)     Glaucoma     Hyperlipidemia     Hypertension     Indigestion     Interpolated PVCs       Past Surgical History:   Procedure Laterality Date    HX BREAST BIOPSY      HX HYSTERECTOMY  1981    STEPHAN/BSO    HX OOPHORECTOMY      right     Current Outpatient Medications   Medication Sig Dispense Refill    cyanocobalamin (Vitamin B-12) 1,000 mcg tablet Take 1,000 mcg by mouth daily.  folic acid/multivit-min/lutein (CENTRUM SILVER PO) Take  by mouth.  lisinopril-hydroCHLOROthiazide (PRINZIDE, ZESTORETIC) 20-25 mg per tablet TAKE 1 TABLET BY MOUTH  DAILY 90 Tab 1    estradioL (CLIMARA) 0.1 mg/24 hr APPLY 1 PATCH EVERY 7 DAYS 12 Patch 3    bimatoprost (LUMIGAN) 0.03 % ophthalmic drops Administer 1 Drop to both eyes every evening.  DORZOLAMIDE HCL/TIMOLOL MALEAT (DORZOLAMIDE-TIMOLOL OP) Apply  to eye.  pravastatin (PRAVACHOL) 20 mg tablet Take 1 Tab by mouth nightly. 90 Tab 3    amLODIPine (NORVASC) 2.5 mg tablet Take 1 Tab by mouth daily. 30 Tab 5    fluticasone propionate (FLONASE) 50 mcg/actuation nasal spray 2 sprays in each nostril daily 1 Bottle 5    CALCIUM CARBONATE/VITAMIN D3 (CALCIUM WITH VITAMIN D PO) Take  by mouth.        No Known Allergies  Family History   Problem Relation Age of Onset   [employer]ce Glaucoma Mother     Other Father         cerebral aneurysm    Hypertension Sister     Diabetes Sister     Hypertension Sister    • Diabetes Paternal Uncle      Social History     Tobacco Use   • Smoking status: Never Smoker   • Smokeless tobacco: Never Used   Substance Use Topics   • Alcohol use: No     Comment: with dinner     Patient Active Problem List   Diagnosis Code   • Eczema L30.9   • Family history of diabetes mellitus (DM) Z83.3   • Glaucoma H40.9   • Generalized osteoarthrosis, involving multiple sites M15.9   • Primary hypertension I10   • Hyperlipidemia LDL goal <130 E78.5   • Advance directive discussed with patient Z71.89   • Raynaud's disease without gangrene I73.00   • Impaired fasting glucose R73.01     The pt is a 81 yo female with h/o prediabetes, HLD, eczema per EHR, OA, and glaucoma.    Health Maintenance History  Immunizations reviewed:   Tdap up to date   Pneumovax: up to date   Flu: up to date  Zoster: over-due    Immunization History   Administered Date(s) Administered   • (RETIRED) Pneumococcal Vaccine (Unspecified Type) 05/23/2012   • Covid-19, MODERNA, Mrna, Lnp-s, Pf, 100mcg/0.5mL 01/26/2021   • H1N1 Influenza Virus Vaccine 12/17/2009   • Influenza High Dose Vaccine PF 11/11/2015, 11/14/2016, 11/14/2017   • Influenza Vaccine 10/04/2013, 10/20/2014   • Influenza Vaccine (Tri) Adjuvanted (>65 Yrs FLUAD TRI 70088) 12/04/2018, 12/13/2019   • Influenza Vaccine Split 10/05/2011, 10/23/2012   • Influenza Vaccine Whole 10/15/2010   • Influenza, Quadrivalent, Adjuvanted (>65 Yrs FLUAD QUAD 64714) 10/26/2020   • Pneumococcal Conjugate (PCV-13) 02/21/2018       Colonoscopy: Up to date. No bleeding.      Eye exam: Up to date. We don't have records.     Mammo: Up to date, in the [employer] system - done in December and WNL    Dexascan: Up to date.     Pelvic/Pap: No bleeding.      ROS    Depression Risk Factor Screening:      Patient Health Questionnaire (PHQ-2)   Over the last 2 weeks, how often have you been bothered by any of the following problems?  · Little interest or pleasure in doing things?  · Not at all.  [0]  · Feeling down, depressed, or hopeless? · Not at all. [0]      Total Score: 0/6  PHQ-2 Assessment Scoring:   A score of 2 or more requires further screening with the PHQ-9    Alcohol Risk Factor Screening:   Women: On any occasion during the past 3 months, have you had more than 3 drinks containing alcohol? no    Do you average more than 7 drinks per week? no    Tobacco Use Screening:     Social History     Tobacco Use   Smoking Status Never Smoker   Smokeless Tobacco Never Used       Hearing Loss    Hearing is adequate/unchanged since her last [facility] apt    Activities of Daily Living   Self-care. Requires assistance with: no ADLs  She does not need assistance with ALDs/IADls. Fall Risk   no falls within the past year    Abuse Screen   None    Additional Examination Findings:  Vitals:    02/19/21 1403 02/19/21 1414 02/19/21 1526   BP: (!) 180/77 (!) 164/68 (!) 177/71   Pulse: 62     Resp: 12     Temp: 97.4 °F (36.3 °C)     TempSrc: Temporal     SpO2: 100%     Weight: 114 lb (51.7 kg)     Height: 5' 1\" (1.549 m)     PainSc:   2     PainLoc: Back        Body mass index is 21.54 kg/m². Evaluation of Cognitive Function:  Mood/affect: Euthymic  Appearance: Well-groomed  Family member/caregiver input: she is not with a family member during her visit. General:   Well-nourished, well-groomed, pleasant, alert, in no acute distress. Head:  Normocephalic, atraumatic  Ears:  External ears WNL  Eyes:  Clear sclera  Neuro:   Alert, conversant, appropriate, following commands, no sensory deficits  Skin:    No rashes noted  Psych: Affect, mood and judgment appropriate      Dementia Screen:  Clock Drawing (ten past eleven) Exercise: unremarkable.       LABS   Data Review:   Lab Results   Component Value Date/Time    Sodium 131 (L) 02/17/2021 10:42 AM    Potassium 3.6 02/17/2021 10:42 AM    Chloride 95 (L) 02/17/2021 10:42 AM    CO2 27 02/17/2021 10:42 AM    Anion gap 9 02/17/2021 10:42 AM    Glucose 115 (H) 02/17/2021 10:42 AM    BUN 11 02/17/2021 10:42 AM    Creatinine 0.67 02/17/2021 10:42 AM    BUN/Creatinine ratio 16 02/17/2021 10:42 AM    GFR est AA >60 02/17/2021 10:42 AM    GFR est non-AA >60 02/17/2021 10:42 AM    Calcium 8.4 (L) 02/17/2021 10:42 AM       Lab Results   Component Value Date/Time    WBC 6.6 02/17/2021 10:42 AM    WBC 6.8 05/16/2012 09:03 AM    HGB 12.4 02/17/2021 10:42 AM    HCT 35.4 02/17/2021 10:42 AM    PLATELET 306 13/59/6580 10:42 AM    MCV 89.4 02/17/2021 10:42 AM       Lab Results   Component Value Date/Time    Hemoglobin A1c 5.9 (H) 02/17/2021 10:42 AM       Lab Results   Component Value Date/Time    Cholesterol, total 244 (H) 02/17/2021 10:42 AM    HDL Cholesterol 61 (H) 02/17/2021 10:42 AM    LDL, calculated 164 (H) 02/17/2021 10:42 AM    VLDL, calculated 19 02/17/2021 10:42 AM    Triglyceride 95 02/17/2021 10:42 AM    CHOL/HDL Ratio 4.0 02/17/2021 10:42 AM       Patient Care Team:  Joy Ardon MD as PCP - General (Family Medicine)  Joy Ardon MD as PCP - [address] [place], [place] [address]led Provider  Phuong Brian MD (Surgery)  Kimo Thao MD (Ophthalmology)    End-of-life planning  Advanced Directive in the case than an injury or illness causes the patient to be unable to make health care decisions was discussed with the patient. Advice/Referrals/Counselling/Plan:   Education and counseling provided:  Are appropriate based on today's review and evaluation  End-of-Life planning (with patient's consent)  Pneumococcal Vaccine  Influenza Vaccine  Screening Mammography  Screening Pap and pelvic (covered once every 2 years)  Colorectal cancer screening tests  Cardiovascular screening blood test  Bone mass measurement (DEXA)  Screening for glaucoma  Diabetes screening test  Include in education list (weight loss, physical activity, smoking cessation, fall prevention, and nutrition)    ICD-10-CM ICD-9-CM    1.  JOHNSON (dyspnea on exertion)  R06.00 786.09 XR CHEST PA LAT      EKG, 12 LEAD, INITIAL   2. Hyperlipidemia LDL goal <130  E78.5 272.4 LIPID PANEL      DISCONTINUED: pravastatin (PRAVACHOL) 40 mg tablet   3. Primary hypertension  I10 401.9    4. Glaucoma of left eye, unspecified glaucoma type  H40.9 365.9    5. Hyperglycemia  R73.9 790.29 HEMOGLOBIN A1C W/O EAG     reviewed diet, exercise and weight control. Brief written plan, checklist    Assessment/Plan:    Health Maintenance:  - I encouraged her to get all recommended vaccinations and a formal eye exam      Lab review: labs are reviewed in the [address] [place] [address] (ACP) Provider Conversation     Date of ACP Conversation: 2/19/21  Persons included in Conversation:  patient    Authorized Decision Maker (if patient is incapable of making informed decisions): This person is:   Next of Kin by law (only applies in absence of a Healthcare Power of  or Legal Guardian)          For Patients with Decision Making Capacity:   Values/Goals: Exploration of values, goals, and preferences if recovery is not expected, even with continued medical treatment in the event of:  Imminent death  Severe, permanent brain injury    Conversation Outcomes / Follow-Up Plan:   ACP in process - information provided, considering goals and options. I encouraged her to complete her advance directive. I have discussed the diagnosis with the patient and the intended plan as seen in the above orders. The patient has received an after-visit summary and questions were answered concerning future plans. I have discussed medication side effects and warnings with the patient as well. I have reviewed the plan of care with the patient, accepted their input and they are in agreement with the treatment goals. Follow-up and Dispositions    · Return in about 6 weeks (around 4/2/2021) for BP check, HLD.

## 2021-03-15 NOTE — PROGRESS NOTES
MAMMO-DIGITAL SCREENING BILAT W/ [id number]/14/2020  [facility]  Result Impression     No mammographic evidence of malignancy. Routine follow up recommended. Category 2 : Benign    Density C: The breasts are heterogeneously dense, which may obscure small masses. Signed By: Elmira Juárez MD on 12/14/2020 8:05 AM   Result Narrative   Screening Bilateral digital mammography with tomosynthesis and CAD     Category 2 : Benign     HISTORY: Routine screening    COMPARISON: [id number]    TECHNIQUE: Low Dose full field Digital Breast Tomosynthesis examination was performed of the breasts with CC and MLO  2D (C-view) and 3D acquisitions. FINDINGS:      There are no suspicious masses, asymmetries, calcifications or areas of architectural distortion. Biopsy clip again noted in the upper left breast. There are no suspicious skin changes or axillary adenopathy. These images were reviewed with CAD--R2 image . Other Result Information   Interface, [employer] - 12/14/2020  8:07 AM EST  Screening Bilateral digital mammography with tomosynthesis and CAD     Category 2 : Benign     HISTORY: Routine screening    COMPARISON: [id number]    TECHNIQUE: Low Dose full field Digital Breast Tomosynthesis examination was performed of the breasts with CC and MLO  2D (C-view) and 3D acquisitions. FINDINGS:      There are no suspicious masses, asymmetries, calcifications or areas of architectural distortion. Biopsy clip again noted in the upper left breast. There are no suspicious skin changes or axillary adenopathy. These images were reviewed with CAD--R2 image . IMPRESSION    No mammographic evidence of malignancy. Routine follow up recommended. Category 2 : Benign    Density C: The breasts are heterogeneously dense, which may obscure small masses.     Signed By: Elmira Juárez MD on 12/14/2020 8:05 AM

## 2021-03-29 ENCOUNTER — OFFICE VISIT (OUTPATIENT)
Dept: INTERNAL MEDICINE CLINIC | Age: 81
End: 2021-03-29
Payer: MEDICARE

## 2021-03-29 VITALS
DIASTOLIC BLOOD PRESSURE: 70 MMHG | SYSTOLIC BLOOD PRESSURE: 152 MMHG | WEIGHT: 111.2 LBS | BODY MASS INDEX: 20.99 KG/M2 | RESPIRATION RATE: 16 BRPM | TEMPERATURE: 97.1 F | HEIGHT: 61 IN | HEART RATE: 55 BPM | OXYGEN SATURATION: 100 %

## 2021-03-29 DIAGNOSIS — K59.09 CHRONIC CONSTIPATION: ICD-10-CM

## 2021-03-29 DIAGNOSIS — Z78.0 MENOPAUSE: ICD-10-CM

## 2021-03-29 DIAGNOSIS — E78.5 HYPERLIPIDEMIA LDL GOAL <130: ICD-10-CM

## 2021-03-29 DIAGNOSIS — R51.9 NONINTRACTABLE HEADACHE, UNSPECIFIED CHRONICITY PATTERN, UNSPECIFIED HEADACHE TYPE: ICD-10-CM

## 2021-03-29 DIAGNOSIS — M54.31 SCIATICA OF RIGHT SIDE: Primary | ICD-10-CM

## 2021-03-29 DIAGNOSIS — Z82.49 FAMILY HISTORY OF CEREBRAL ANEURYSM: ICD-10-CM

## 2021-03-29 DIAGNOSIS — R06.09 DOE (DYSPNEA ON EXERTION): ICD-10-CM

## 2021-03-29 DIAGNOSIS — I10 PRIMARY HYPERTENSION: ICD-10-CM

## 2021-03-29 DIAGNOSIS — R73.01 IMPAIRED FASTING GLUCOSE: ICD-10-CM

## 2021-03-29 PROCEDURE — G8536 NO DOC ELDER MAL SCRN: HCPCS | Performed by: INTERNAL MEDICINE

## 2021-03-29 PROCEDURE — G8754 DIAS BP LESS 90: HCPCS | Performed by: INTERNAL MEDICINE

## 2021-03-29 PROCEDURE — 1101F PT FALLS ASSESS-DOCD LE1/YR: CPT | Performed by: INTERNAL MEDICINE

## 2021-03-29 PROCEDURE — G8420 CALC BMI NORM PARAMETERS: HCPCS | Performed by: INTERNAL MEDICINE

## 2021-03-29 PROCEDURE — G8399 PT W/DXA RESULTS DOCUMENT: HCPCS | Performed by: INTERNAL MEDICINE

## 2021-03-29 PROCEDURE — G8427 DOCREV CUR MEDS BY ELIG CLIN: HCPCS | Performed by: INTERNAL MEDICINE

## 2021-03-29 PROCEDURE — G8510 SCR DEP NEG, NO PLAN REQD: HCPCS | Performed by: INTERNAL MEDICINE

## 2021-03-29 PROCEDURE — G8753 SYS BP > OR = 140: HCPCS | Performed by: INTERNAL MEDICINE

## 2021-03-29 PROCEDURE — 99214 OFFICE O/P EST MOD 30 MIN: CPT | Performed by: INTERNAL MEDICINE

## 2021-03-29 PROCEDURE — 1090F PRES/ABSN URINE INCON ASSESS: CPT | Performed by: INTERNAL MEDICINE

## 2021-03-29 PROCEDURE — G0463 HOSPITAL OUTPT CLINIC VISIT: HCPCS | Performed by: INTERNAL MEDICINE

## 2021-03-29 RX ORDER — LISINOPRIL AND HYDROCHLOROTHIAZIDE 20; 25 MG/1; MG/1
TABLET ORAL
Qty: 90 TAB | Refills: 1 | Status: SHIPPED | COMMUNITY
Start: 2021-03-29 | End: 2021-11-04

## 2021-03-29 RX ORDER — AMLODIPINE BESYLATE 5 MG/1
5 TABLET ORAL DAILY
Qty: 90 TAB | Refills: 3 | Status: SHIPPED | COMMUNITY
Start: 2021-03-29 | End: 2021-06-16

## 2021-03-29 RX ORDER — METHYLPREDNISOLONE 4 MG/1
TABLET ORAL
Qty: 1 DOSE PACK | Refills: 0 | Status: SHIPPED
Start: 2021-03-29 | End: 2021-05-27

## 2021-03-29 NOTE — PROGRESS NOTES
INTERNISTS [facility]:  3/29/2021, MRN: [id number]      Pablito Nevarez is a 79 y.o. female and presents to clinic for Hypertension (6 week follow up ), Cholesterol Problem, and Back Pain (intermittent lower back pain radiates to right leg )    Subjective: The pt is a 78 yo female with h/o prediabetes, HLD, eczema per EHR, OA, and glaucoma.     1. Menopausal Sx: Resolved since her last apt. S/p hormone replacement rx. 2. HTN: BP is 152/70. Taking norvasc and lisinopril-HCTZ. No adverse side effects. 3. Prediabetes: She is eating less carbs since her last apt. Her A1C was 5.9. \"I'm [facility]. I like to eat fruit. \"    4. JOHNSON: Her CXR and EKG are unremarkable except for sinus bradycardia. HR is in the 50s. Her JOHNSON is off/on. No CP or cough. Her JOHNSON sx are mostly associated with going for walks with her mask on.    5. Constipation: She continues to have constipation off/on. She does not eat a high fiber diet. No bleeding. She gets lower abdominal pain off/on. 6. Back Pain: Along her lower back. Radiates down her RLE. 7. Family H/o Cerebral Aneurysm: Her father  of a cerebral aneurysm. Her sister had some type of CVA and . 8. HLD: Taking pravastatin since her last apt. Her last LDL was in the 160s       Patient Active Problem List    Diagnosis Date Noted    Impaired fasting glucose 2018    Raynaud's disease without gangrene 05/15/2017    Hyperlipidemia LDL goal <130 2016    Advance directive discussed with patient 2016    Primary hypertension 2015    Generalized osteoarthrosis, involving multiple sites 2012    Eczema     Family history of diabetes mellitus (DM)     Glaucoma        Current Outpatient Medications   Medication Sig Dispense Refill    pravastatin (PRAVACHOL) 20 mg tablet Take 1 Tab by mouth nightly. 90 Tab 3    amLODIPine (NORVASC) 2.5 mg tablet Take 1 Tab by mouth daily.  30 Tab 5    cyanocobalamin (Vitamin B-12) 1,000 mcg tablet Take 1,000 mcg by mouth daily.  folic acid/multivit-min/lutein (CENTRUM SILVER PO) Take  by mouth.  lisinopril-hydroCHLOROthiazide (PRINZIDE, ZESTORETIC) 20-25 mg per tablet TAKE 1 TABLET BY MOUTH  DAILY 90 Tab 1    estradioL (CLIMARA) 0.1 mg/24 hr APPLY 1 PATCH EVERY 7 DAYS 12 Patch 3    fluticasone propionate (FLONASE) 50 mcg/actuation nasal spray 2 sprays in each nostril daily 1 Bottle 5    bimatoprost (LUMIGAN) 0.03 % ophthalmic drops Administer 1 Drop to both eyes every evening.  DORZOLAMIDE HCL/TIMOLOL MALEAT (DORZOLAMIDE-TIMOLOL OP) Apply  to eye.  CALCIUM CARBONATE/VITAMIN D3 (CALCIUM WITH VITAMIN D PO) Take  by mouth. No Known Allergies    Past Medical History:   Diagnosis Date    Diverticulosis of sigmoid colon 11/13/15    mild; Dr. Bassam Ring Eczema     Elevated cholesterol     Family history of diabetes mellitus (DM)     Glaucoma     Hyperlipidemia     Hypertension     Indigestion     Interpolated PVCs        Past Surgical History:   Procedure Laterality Date    HX BREAST BIOPSY      HX HYSTERECTOMY  1981    STEPHAN/BSO    HX OOPHORECTOMY      right       Family History   Problem Relation Age of Onset   [employer] Glaucoma Mother     Other Father         cerebral aneurysm    Hypertension Sister     Diabetes Sister     Hypertension Sister     Diabetes Paternal Uncle        Social History     Tobacco Use    Smoking status: Never Smoker    Smokeless tobacco: Never Used   Substance Use Topics    Alcohol use: No     Comment: with dinner       ROS   Review of Systems   Constitutional: Negative for chills and fever. HENT: Negative for ear pain and sore throat. Eyes: Negative for blurred vision and pain. Respiratory: Positive for shortness of breath (JOHNSON - see HPI). Cardiovascular: Negative for chest pain. Gastrointestinal: Positive for abdominal pain and constipation. Negative for blood in stool and melena.         No sx today though   Genitourinary: Negative for dysuria and hematuria. Musculoskeletal: Negative for joint pain and myalgias. Skin: Negative for rash. Neurological: Positive for headaches. Negative for tingling and focal weakness. Endo/Heme/Allergies: Does not bruise/bleed easily. Psychiatric/Behavioral: Negative for substance abuse. Objective     Vitals:    03/29/21 0925 03/29/21 0937   BP: (!) 156/71 (!) 152/70   Pulse: (!) 55    Resp: 16    Temp: 97.1 °F (36.2 °C)    TempSrc: Temporal    SpO2: 100%    Weight: 111 lb 3.2 oz (50.4 kg)    Height: 5' 1\" (1.549 m)    PainSc:   2    PainLoc: Leg        Physical Exam  Vitals signs and nursing note reviewed. HENT:      Head: Normocephalic and atraumatic. Right Ear: External ear normal.      Left Ear: External ear normal.   Eyes:      General: No scleral icterus. Right eye: No discharge. Left eye: No discharge. Conjunctiva/sclera: Conjunctivae normal.   Neck:      Musculoskeletal: Neck supple. Cardiovascular:      Rate and Rhythm: Normal rate and regular rhythm. Heart sounds: Normal heart sounds. No murmur. No friction rub. No gallop. Pulmonary:      Effort: Pulmonary effort is normal. No respiratory distress. Breath sounds: Normal breath sounds. No wheezing or rales. Chest:      Chest wall: No tenderness. Abdominal:      General: Bowel sounds are normal. There is no distension. Palpations: Abdomen is soft. There is no mass. Tenderness: There is no abdominal tenderness. There is no guarding or rebound. Musculoskeletal:         General: No swelling (BUE) or tenderness (BUE). Lymphadenopathy:      Cervical: No cervical adenopathy. Skin:     General: Skin is warm and dry. Findings: No erythema. Neurological:      Mental Status: She is alert and oriented to person, place, and time. Motor: No abnormal muscle tone. Gait: Gait is intact.  Gait normal.   Psychiatric:         Mood and Affect: Mood and affect normal.         LABS   Data Review:   Lab Results   Component Value Date/Time    WBC 6.6 02/17/2021 10:42 AM    WBC 6.8 05/16/2012 09:03 AM    HGB 12.4 02/17/2021 10:42 AM    HCT 35.4 02/17/2021 10:42 AM    PLATELET 077 77/44/3108 10:42 AM    MCV 89.4 02/17/2021 10:42 AM       Lab Results   Component Value Date/Time    Sodium 131 (L) 02/17/2021 10:42 AM    Potassium 3.6 02/17/2021 10:42 AM    Chloride 95 (L) 02/17/2021 10:42 AM    CO2 27 02/17/2021 10:42 AM    Anion gap 9 02/17/2021 10:42 AM    Glucose 115 (H) 02/17/2021 10:42 AM    BUN 11 02/17/2021 10:42 AM    Creatinine 0.67 02/17/2021 10:42 AM    BUN/Creatinine ratio 16 02/17/2021 10:42 AM    GFR est AA >60 02/17/2021 10:42 AM    GFR est non-AA >60 02/17/2021 10:42 AM    Calcium 8.4 (L) 02/17/2021 10:42 AM       Lab Results   Component Value Date/Time    Cholesterol, total 244 (H) 02/17/2021 10:42 AM    HDL Cholesterol 61 (H) 02/17/2021 10:42 AM    LDL, calculated 164 (H) 02/17/2021 10:42 AM    VLDL, calculated 19 02/17/2021 10:42 AM    Triglyceride 95 02/17/2021 10:42 AM    CHOL/HDL Ratio 4.0 02/17/2021 10:42 AM       Lab Results   Component Value Date/Time    Hemoglobin A1c 5.9 (H) 02/17/2021 10:42 AM       Assessment/Plan:   1. Family history of cerebral aneurysm: Has HAs off/on.  - Head/Neck CTA ordered    ORDERS:  - CTA HEAD NECK W WO CONT; Future    2. Sciatica of right side  - Medrol dose pack. F/u with me to assess her response. ORDERS:  - methylPREDNISolone (MEDROL DOSEPACK) 4 mg tablet; Take per package instructions  Dispense: 1 Dose Pack; Refill: 0    3. Primary hypertension: Her BP was persistently greater than 140/90 in our office. Creatinine is mildly elevated. -Refilling her lisinopril-HCTZ. I am increasing her Norvasc from 2.5 mg daily to 5 mg daily. I will have her return to our office to assess her BP.  -I encouraged her to drink more water. We will need to recheck her CMP later this year.     ORDERS:  - lisinopril-hydroCHLOROthiazide (PRINZIDE, ZESTORETIC) 20-25 mg per tablet; TAKE 1 TABLET BY MOUTH  DAILY  Dispense: 90 Tab; Refill: 1  - amLODIPine (NORVASC) 5 mg tablet; Take 1 Tab by mouth daily. Dispense: 90 Tab; Refill: 3    4. Impaired fasting glucose  -Patient instructed to limit her carbohydrate intake. I will need to recheck her A1c in 6 months. 5. Hyperlipidemia LDL goal <130  -Continue with pravastatin.  -I will need to recheck her lipid panel in 6 months. 7. Chronic constipation:   -Referral to GI for recommendations regarding her chronic constipation.  -Meanwhile, I encouraged her to maintain a high-fiber diet    ORDERS:  - REFERRAL TO GASTROENTEROLOGY    8. JOHNSON: EKG and chest x-ray are unremarkable. Symptoms are not worsening. Symptoms may be related to her wearing her mask and feeling anxious. We will proceed with observation for now. 9. Menopausal Sx: Resolved. Continue holding hormone replacement therapy. Health Maintenance Due   Topic Date Due    Hepatitis C Screening  Never done    Shingrix Vaccine Age 50> (1 of 2) Never done    Pneumococcal 65+ years (2 of 2 - PPSV23) 02/21/2019     Lab review: labs are reviewed in the EHR    I have discussed the diagnosis with the patient and the intended plan as seen in the above orders. The patient has received an after-visit summary and questions were answered concerning future plans. I have discussed medication side effects and warnings with the patient as well. I have reviewed the plan of care with the patient, accepted their input and they are in agreement with the treatment goals. All questions were answered. The patient understands the plan of care. Handouts provided today with above information. Pt instructed if symptoms worsen to call the office or report to the ED for continued care. Greater than 50% of the visit time was spent in counseling and/or coordination of care.       Voice recognition was used to generate this report, which may have resulted in some phonetic based errors in grammar and contents. Even though attempts were made to correct all the mistakes, some may have been missed, and remained in the body of the document.           Elsi Stallworth MD

## 2021-03-29 NOTE — PATIENT INSTRUCTIONS
Constipation: Care Instructions Your Care Instructions Constipation means that you have a hard time passing stools (bowel movements). People pass stools from 3 times a day to once every 3 days. What is normal for you may be different. Constipation may occur with pain in the rectum and cramping. The pain may get worse when you try to pass stools. Sometimes there are small amounts of bright red blood on toilet paper or the surface of stools. This is because of enlarged veins near the rectum (hemorrhoids). A few changes in your diet and lifestyle may help you avoid ongoing constipation. Your doctor may also prescribe medicine to help loosen your stool. Some medicines can cause constipation. These include pain medicines and antidepressants. Tell your doctor about all the medicines you take. Your doctor may want to make a medicine change to ease your symptoms. Follow-up care is a key part of your treatment and safety. Be sure to make and go to all appointments, and call your doctor if you are having problems. It's also a good idea to know your test results and keep a list of the medicines you take. How can you care for yourself at home? · Drink plenty of fluids, enough so that your urine is light yellow or clear like water. If you have kidney, heart, or liver disease and have to limit fluids, talk with your doctor before you increase the amount of fluids you drink. · Include high-fiber foods in your diet each day. These include fruits, vegetables, beans, and whole grains. · Get at least 30 minutes of exercise on most days of the week. Walking is a good choice. You also may want to do other activities, such as running, swimming, cycling, or playing tennis or team sports. · Take a fiber supplement, such as Citrucel or Metamucil, every day. Read and follow all instructions on the label. · Schedule time each day for a bowel movement. A daily routine may help. Take your time having your bowel movement.  
· Support your feet with a small step stool when you sit on the toilet. This helps flex your hips and places your pelvis in a squatting position. · Your doctor may recommend an over-the-counter laxative to relieve your constipation. Examples are Milk of Magnesia and MiraLax. Read and follow all instructions on the label. Do not use laxatives on a long-term basis. When should you call for help? Call your doctor now or seek immediate medical care if: 
  · You have new or worse belly pain.  
  · You have new or worse nausea or vomiting.  
  · You have blood in your stools. Watch closely for changes in your health, and be sure to contact your doctor if: 
  · Your constipation is getting worse.  
  · You do not get better as expected. Where can you learn more? Go to http://www.gray.com/ Enter 21 [phone] in the search box to learn more about \"Constipation: Care Instructions. \" Current as of: June 26, 2019               Content Version: 12.6 © 0157-1784 [employer]. Care instructions adapted under license by [employer] (which disclaims liability or warranty for this information). If you have questions about a medical condition or this instruction, always ask your healthcare professional. [address] any warranty or liability for your use of this information.

## 2021-03-29 NOTE — PROGRESS NOTES
1. Have you been to the ER, urgent care clinic or hospitalized since your last visit? NO.     2. Have you seen or consulted any other health care providers outside of the [address] [place] [address] since your last visit (Include any pap smears or colon screening)?  NO

## 2021-04-05 ENCOUNTER — HOSPITAL ENCOUNTER (OUTPATIENT)
Dept: CT IMAGING | Age: 81
Discharge: HOME | End: 2021-04-05
Attending: INTERNAL MEDICINE
Payer: MEDICARE

## 2021-04-05 DIAGNOSIS — R51.9 NONINTRACTABLE HEADACHE, UNSPECIFIED CHRONICITY PATTERN, UNSPECIFIED HEADACHE TYPE: ICD-10-CM

## 2021-04-05 DIAGNOSIS — Z82.49 FAMILY HISTORY OF CEREBRAL ANEURYSM: ICD-10-CM

## 2021-04-05 LAB — CREAT UR-MCNC: 0.6 MG/DL (ref 0.6–1.3)

## 2021-04-05 PROCEDURE — 74011000636 HC RX REV CODE- 636: Performed by: INTERNAL MEDICINE

## 2021-04-05 PROCEDURE — 82565 ASSAY OF CREATININE: CPT

## 2021-04-05 PROCEDURE — 70498 CT ANGIOGRAPHY NECK: CPT

## 2021-04-05 RX ADMIN — IOPAMIDOL 80 ML: 755 INJECTION, SOLUTION INTRAVENOUS at 11:07

## 2021-04-09 ENCOUNTER — TELEPHONE (OUTPATIENT)
Dept: INTERNAL MEDICINE CLINIC | Age: 81
End: 2021-04-09

## 2021-04-11 DIAGNOSIS — R91.8 PULMONARY NODULES: Primary | ICD-10-CM

## 2021-04-11 DIAGNOSIS — Z86.79 PERSONAL HISTORY OF CEREBRAL ARTERY STENOSIS: ICD-10-CM

## 2021-04-11 DIAGNOSIS — R91.8 PULMONARY NODULES: ICD-10-CM

## 2021-04-12 NOTE — PROGRESS NOTES
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 She also has the mildly dilated ascending aorta and pulmonary nodules seen as an incidental finding. I am ordering a follow-up chest CT for her to get to further investigate pulmonary nodules. Meanwhile, I am referring her to Neurology for evaluation of the cerebral artery stenosis.     Dr. Des Simms  [facility] [address], [address] [place] [address] Str.  Phone: [phone]  Fax: [phone]

## 2021-04-13 ENCOUNTER — TELEPHONE (OUTPATIENT)
Dept: INTERNAL MEDICINE CLINIC | Age: 81
End: 2021-04-13

## 2021-04-13 NOTE — TELEPHONE ENCOUNTER
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 She also has the mildly dilated ascending aorta and pulmonary nodules seen as an incidental finding. I am ordering a follow-up chest CT for her to get to further investigate pulmonary nodules. Meanwhile, I am referring her to Neurology for evaluation of the cerebral artery stenosis.     Dr. Amilcar Hummel  [facility] [address], [address] [place] [address] Str.  Phone: [phone]  Fax: [phone]

## 2021-04-20 ENCOUNTER — HOSPITAL ENCOUNTER (OUTPATIENT)
Dept: CT IMAGING | Age: 81
Discharge: HOME | End: 2021-04-20
Attending: INTERNAL MEDICINE
Payer: MEDICARE

## 2021-04-20 LAB — CREAT UR-MCNC: 0.6 MG/DL (ref 0.6–1.3)

## 2021-04-20 PROCEDURE — 74011000636 HC RX REV CODE- 636: Performed by: INTERNAL MEDICINE

## 2021-04-20 PROCEDURE — 71260 CT THORAX DX C+: CPT

## 2021-04-20 PROCEDURE — 82565 ASSAY OF CREATININE: CPT

## 2021-04-20 RX ADMIN — IOPAMIDOL 80 ML: 612 INJECTION, SOLUTION INTRAVENOUS at 17:03

## 2021-04-25 DIAGNOSIS — R06.09 DOE (DYSPNEA ON EXERTION): ICD-10-CM

## 2021-04-25 DIAGNOSIS — I73.00 RAYNAUD'S DISEASE WITHOUT GANGRENE: ICD-10-CM

## 2021-04-25 DIAGNOSIS — R93.89 ABNORMAL CHEST CT: Primary | ICD-10-CM

## 2021-04-25 RX ORDER — AZITHROMYCIN 250 MG/1
TABLET, FILM COATED ORAL
Qty: 6 TAB | Refills: 0 | Status: SHIPPED | OUTPATIENT
Start: 2021-04-25 | End: 2021-04-26 | Stop reason: SDUPTHER

## 2021-04-25 NOTE — PROGRESS NOTES
Please let her know that her chest CT shows findings are consistent with infection versus inflammation. I am ordering labs to rule out inflammatory causes. Meanwhile, I will have her see Pulmonology to better investigate her JOHNSON sx and chest CT findings. I am also ordering a course of azithromycin for her to take.     Dr. Ursula Garay  [facility], [address] [place] [address], [facility]  Phone: [phone]  Fax: [phone]

## 2021-04-26 DIAGNOSIS — I73.00 RAYNAUD'S DISEASE WITHOUT GANGRENE: ICD-10-CM

## 2021-04-26 DIAGNOSIS — R93.89 ABNORMAL CHEST CT: ICD-10-CM

## 2021-04-26 DIAGNOSIS — R06.09 DOE (DYSPNEA ON EXERTION): ICD-10-CM

## 2021-04-26 RX ORDER — AZITHROMYCIN 250 MG/1
TABLET, FILM COATED ORAL
Qty: 6 TAB | Refills: 0 | Status: SHIPPED | OUTPATIENT
Start: 2021-04-26 | End: 2021-05-27 | Stop reason: ALTCHOICE

## 2021-04-26 NOTE — TELEPHONE ENCOUNTER
----- Message from Dave Jalloh MD sent at 4/25/2021  4:08 PM EDT -----  Please let her know that her chest CT shows findings are consistent with infection versus inflammation. I am ordering labs to rule out inflammatory causes. Meanwhile, I will have her see Pulmonology to better investigate her JOHNSON sx and chest CT findings. I am also ordering a course of azithromycin for her to take.     Dr. Olvin Reece  [facility] [address], [address] [place] [address].  Phone: [phone]  Fax: [phone]

## 2021-04-26 NOTE — TELEPHONE ENCOUNTER
Patient returned call and given results. Antibiotic when to [facility], please sign for blanca.

## 2021-04-27 ENCOUNTER — APPOINTMENT (OUTPATIENT)
Dept: INTERNAL MEDICINE CLINIC | Age: 81
End: 2021-04-27

## 2021-04-27 ENCOUNTER — HOSPITAL ENCOUNTER (OUTPATIENT)
Dept: LAB | Age: 81
Discharge: HOME | End: 2021-04-27
Payer: MEDICARE

## 2021-04-27 DIAGNOSIS — R06.09 DOE (DYSPNEA ON EXERTION): ICD-10-CM

## 2021-04-27 DIAGNOSIS — R93.89 ABNORMAL CHEST CT: ICD-10-CM

## 2021-04-27 DIAGNOSIS — I73.00 RAYNAUD'S DISEASE WITHOUT GANGRENE: ICD-10-CM

## 2021-04-27 LAB — CRP SERPL-MCNC: <0.3 MG/DL (ref 0–0.3)

## 2021-04-27 PROCEDURE — 86235 NUCLEAR ANTIGEN ANTIBODY: CPT

## 2021-04-27 PROCEDURE — 86200 CCP ANTIBODY: CPT

## 2021-04-27 PROCEDURE — 86140 C-REACTIVE PROTEIN: CPT

## 2021-04-27 PROCEDURE — 86256 FLUORESCENT ANTIBODY TITER: CPT

## 2021-04-29 ENCOUNTER — TELEPHONE (OUTPATIENT)
Dept: INTERNAL MEDICINE CLINIC | Age: 81
End: 2021-04-29

## 2021-04-29 LAB
CENTROMERE B AB SER-ACNC: <0.2 AI (ref 0–0.9)
CHROMATIN AB SERPL-ACNC: <0.2 AI (ref 0–0.9)
DSDNA AB SER-ACNC: 2 IU/ML (ref 0–9)
ENA JO1 AB SER-ACNC: <0.2 AI (ref 0–0.9)
ENA RNP AB SER-ACNC: <0.2 AI (ref 0–0.9)
ENA SCL70 AB SER-ACNC: <0.2 AI (ref 0–0.9)
ENA SM AB SER-ACNC: <0.2 AI (ref 0–0.9)
ENA SS-A AB SER-ACNC: <0.2 AI (ref 0–0.9)
ENA SS-B AB SER-ACNC: <0.2 AI (ref 0–0.9)
SEE BELOW, 164869: NORMAL

## 2021-04-29 NOTE — TELEPHONE ENCOUNTER
Patient called and wanted to know where the antibiotics was sent. Patient informed it was sent to [facility]. Patient states her pulm appointment is scheduled for July, but she did request to see DR. Chaparro specifically. Patient advised to call back and ask to see anyone, and to be put on a cancellation list. Patient verbalizes understanding.

## 2021-04-30 ENCOUNTER — TELEPHONE (OUTPATIENT)
Dept: INTERNAL MEDICINE CLINIC | Age: 81
End: 2021-04-30

## 2021-04-30 NOTE — TELEPHONE ENCOUNTER
Ayana Nunez is calling from [address] [place] [address], Dr. Lus Dubin office, stating they are seeing her on Tuesday and needs referral and office note be to faxed to them.   Ph: [phone]  Fax: [phone]

## 2021-05-05 ENCOUNTER — TELEPHONE (OUTPATIENT)
Dept: INTERNAL MEDICINE CLINIC | Age: 81
End: 2021-05-05

## 2021-05-05 DIAGNOSIS — R06.09 DOE (DYSPNEA ON EXERTION): Primary | ICD-10-CM

## 2021-05-05 LAB
C-ANCA TITR SER IF: NORMAL TITER
MYELOPEROXIDASE AB SER IA-ACNC: <9 U/ML (ref 0–9)
P-ANCA ATYPICAL TITR SER IF: NORMAL TITER
P-ANCA TITR SER IF: NORMAL TITER
PROTEINASE3 AB SER IA-ACNC: <3.5 U/ML (ref 0–3.5)

## 2021-05-05 NOTE — TELEPHONE ENCOUNTER
Patient stating she saw the pulmonologist yesterday. Her son, who is also a physician, is recommending she also have he heart checked. She is requesting a referral to Cardio. Dr. Ursula Blankenship.

## 2021-05-05 NOTE — TELEPHONE ENCOUNTER
Referral generated.     Dr. Vicki Ledbetter  [facility], [address] [facility], [facility] [address]  Phone: [phone]  Fax: [phone]

## 2021-05-06 ENCOUNTER — TELEPHONE (OUTPATIENT)
Dept: INTERNAL MEDICINE CLINIC | Age: 81
End: 2021-05-06

## 2021-05-06 NOTE — PROGRESS NOTES
Please let her know that her labs did not show any evidence of inflammation or scarring in her lungs.     Dr. Hardy Kenney  [facility], [facility], [address] [place] [address] Str.  Phone: [phone]  Fax: [phone]

## 2021-05-06 NOTE — TELEPHONE ENCOUNTER
----- Message from Di Smith MD sent at 5/6/2021  2:05 PM EDT -----  Please let her know that her labs did not show any evidence of inflammation or scarring in her lungs.     Dr. Katherine Castillo  [facility] of [address], [address] [place] [address].  Phone: [phone]  Fax: [phone]

## 2021-05-07 NOTE — TELEPHONE ENCOUNTER
Patient is aware labs did not show any evidence of inflammation or scarring in her lungs. Patient verbalizes understanding.

## 2021-05-10 LAB
14.3.3 ETA, RHEUM. ARTHRITIS: <0.2 NG/ML
CCP IGA+IGG SERPL IA-ACNC: <20 UNITS
RHEUMATOID FACT SERPL-ACNC: <14 UNITS/ML

## 2021-05-27 ENCOUNTER — OFFICE VISIT (OUTPATIENT)
Dept: CARDIOLOGY CLINIC | Age: 81
End: 2021-05-27
Payer: MEDICARE

## 2021-05-27 VITALS
SYSTOLIC BLOOD PRESSURE: 146 MMHG | BODY MASS INDEX: 19.62 KG/M2 | DIASTOLIC BLOOD PRESSURE: 62 MMHG | HEART RATE: 55 BPM | WEIGHT: 106.6 LBS | OXYGEN SATURATION: 99 % | HEIGHT: 62 IN | TEMPERATURE: 97.9 F

## 2021-05-27 DIAGNOSIS — I10 ESSENTIAL HYPERTENSION: ICD-10-CM

## 2021-05-27 DIAGNOSIS — E78.5 HYPERLIPIDEMIA, UNSPECIFIED HYPERLIPIDEMIA TYPE: ICD-10-CM

## 2021-05-27 DIAGNOSIS — R06.02 SHORTNESS OF BREATH: Primary | ICD-10-CM

## 2021-05-27 PROCEDURE — G8427 DOCREV CUR MEDS BY ELIG CLIN: HCPCS | Performed by: INTERNAL MEDICINE

## 2021-05-27 PROCEDURE — G8754 DIAS BP LESS 90: HCPCS | Performed by: INTERNAL MEDICINE

## 2021-05-27 PROCEDURE — 1101F PT FALLS ASSESS-DOCD LE1/YR: CPT | Performed by: INTERNAL MEDICINE

## 2021-05-27 PROCEDURE — 1090F PRES/ABSN URINE INCON ASSESS: CPT | Performed by: INTERNAL MEDICINE

## 2021-05-27 PROCEDURE — G8420 CALC BMI NORM PARAMETERS: HCPCS | Performed by: INTERNAL MEDICINE

## 2021-05-27 PROCEDURE — G8536 NO DOC ELDER MAL SCRN: HCPCS | Performed by: INTERNAL MEDICINE

## 2021-05-27 PROCEDURE — 99204 OFFICE O/P NEW MOD 45 MIN: CPT | Performed by: INTERNAL MEDICINE

## 2021-05-27 PROCEDURE — G8432 DEP SCR NOT DOC, RNG: HCPCS | Performed by: INTERNAL MEDICINE

## 2021-05-27 PROCEDURE — G8753 SYS BP > OR = 140: HCPCS | Performed by: INTERNAL MEDICINE

## 2021-05-27 PROCEDURE — G8399 PT W/DXA RESULTS DOCUMENT: HCPCS | Performed by: INTERNAL MEDICINE

## 2021-05-27 RX ORDER — ASPIRIN 81 MG/1
81 TABLET ORAL DAILY
Qty: 100 TABLET | Refills: 1 | Status: SHIPPED | OUTPATIENT
Start: 2021-05-27 | End: 2022-01-28 | Stop reason: SDUPTHER

## 2021-05-27 RX ORDER — OMEPRAZOLE 20 MG/1
20 CAPSULE, DELAYED RELEASE ORAL DAILY
COMMUNITY

## 2021-05-27 NOTE — PROGRESS NOTES
HISTORY OF PRESENT ILLNESS  Compa Beebe is a 79 y.o. female. 5/27/2021  Patient seen today for new patient evaluation. She is referred here for evaluation of shortness of breath and dyspnea. For about 1 year she has been having shortness of breath that is progressively getting worse. Recently had bronchoscopy that showed abnormality with fungal cells. She is on treatment under Dr. Caesar Rivas. She denies any chest pain. Denies orthopnea PND or peripheral edema. She has no prior cardiac history she has history of hypertension and hyperlipidemia on treatment. She has occasional dizziness likely related to antihypertensive medications      Review of Systems   Constitutional: Negative for chills and fever. HENT: Negative for nosebleeds. Eyes: Negative for blurred vision and double vision. Respiratory: Positive for shortness of breath. Negative for cough, hemoptysis, sputum production and wheezing. Cardiovascular: Negative for chest pain, palpitations, orthopnea, claudication, leg swelling and PND. Gastrointestinal: Negative for abdominal pain, heartburn, nausea and vomiting. Musculoskeletal: Negative for myalgias. Skin: Negative for rash. Neurological: Negative for dizziness, weakness and headaches. Endo/Heme/Allergies: Does not bruise/bleed easily.      Family History   Problem Relation Age of Onset   [employer] Glaucoma Mother     Other Father         cerebral aneurysm    Hypertension Sister     Diabetes Sister     Hypertension Sister     Diabetes Paternal Uncle        Past Medical History:   Diagnosis Date    Diverticulosis of sigmoid colon 11/13/15    mild; Dr. Skinner [place] Eczema     Elevated cholesterol     Family history of diabetes mellitus (DM)     Glaucoma     Hyperlipidemia     Hypertension     Indigestion     Interpolated PVCs        Past Surgical History:   Procedure Laterality Date    HX BREAST BIOPSY      HX HYSTERECTOMY  1981    STEPHAN/BSO    HX OOPHORECTOMY      right Social History     Tobacco Use    Smoking status: Never Smoker    Smokeless tobacco: Never Used   Substance Use Topics    Alcohol use: No     Comment: with dinner a few days per week       No Known Allergies    Prior to Admission medications    Medication Sig Start Date End Date Taking? Authorizing Provider   psyllium husk (DAILY FIBER PO) Take  by mouth. Yes Provider, Historical   psyllium (METAMUCIL) powd Take  by mouth. Yes Provider, Historical   omeprazole (PRILOSEC) 20 mg capsule Take 20 mg by mouth daily. Yes Provider, Historical   aspirin delayed-release 81 mg tablet Take 1 Tablet by mouth daily. 5/27/21  Yes Fariba Noel MD   lisinopril-hydroCHLOROthiazide (PRINZIDE, ZESTORETIC) 20-25 mg per tablet TAKE 1 TABLET BY MOUTH  DAILY 3/29/21  Yes Mehrdad Jensen MD   amLODIPine (NORVASC) 5 mg tablet Take 1 Tab by mouth daily. 3/29/21  Yes Mehrdad Jensen MD   pravastatin (PRAVACHOL) 20 mg tablet Take 1 Tab by mouth nightly. 2/23/21  Yes Mehrdad Jensen MD   cyanocobalamin (Vitamin B-12) 1,000 mcg tablet Take 1,000 mcg by mouth daily. Yes Provider, Historical   folic acid/multivit-min/lutein (CENTRUM SILVER PO) Take  by mouth. Yes Provider, Historical   bimatoprost (LUMIGAN) 0.03 % ophthalmic drops Administer 1 Drop to both eyes every evening. Yes Provider, Historical   DORZOLAMIDE HCL/TIMOLOL MALEAT (DORZOLAMIDE-TIMOLOL OP) Apply  to eye. Yes Provider, Historical   CALCIUM CARBONATE/VITAMIN D3 (CALCIUM WITH VITAMIN D PO) Take  by mouth. Yes Provider, Historical         Visit Vitals  BP (!) 146/62 (BP 1 Location: Right arm, BP Patient Position: Sitting, BP Cuff Size: Adult)   Pulse (!) 55   Temp 97.9 °F (36.6 °C) (Temporal)   Ht 5' 2\" (1.575 m)   Wt 48.4 kg (106 lb 9.6 oz)   SpO2 99%   BMI 19.50 kg/m²         Physical Exam  Constitutional:       Appearance: She is well-developed. HENT:      Head: Normocephalic and atraumatic.    Eyes:      Conjunctiva/sclera: Conjunctivae normal. Neck:      Thyroid: No thyromegaly. Vascular: No JVD. Trachea: No tracheal deviation. Cardiovascular:      Rate and Rhythm: Normal rate and regular rhythm. Chest Wall: PMI is not displaced. Pulses: No decreased pulses. Heart sounds: No murmur heard. No gallop. No S3 sounds. Pulmonary:      Effort: No respiratory distress. Breath sounds: No wheezing or rales. Chest:      Chest wall: No tenderness. Abdominal:      Palpations: Abdomen is soft. Tenderness: There is no abdominal tenderness. Musculoskeletal:      Cervical back: Neck supple. Skin:     General: Skin is warm. Neurological:      Mental Status: She is alert and oriented to person, place, and time. Ms. Mavis Prader has a reminder for a \"due or due soon\" health maintenance. I have asked that she contact her primary care provider for follow-up on this health maintenance. No flowsheet data found. I have personally reviewed patient's records available from hospital and other providers and incorporated findings in patient care. Provider Notes, lab results, EKG, CT scan chest and CT any head and neck  CTA NECK VASCULAR ANALYSIS: 4/2021     Aortic arch: Mildly ectatic ascending aorta measuring 4 cm. Bronchoscpy-5/2021  Findings: The nasopharynx/oropharynx appears normal. The larynx appears normal except fro mild erythema dn edema. the arytenoids appeared erythematous and inflammed? reflux. The vocal cords appear normal. The subglottic space is normal. The trachea is of normal caliber. The royer is sharp. The tracheobronchial tree was examined to at least the first subsegmental level. Bronchial mucosa and anatomy are normal; there are no endobronchial lesions, and minimal clear secretions.  Bronchial washings taken from the RUL posterior segment and CHELSI with return of blood tinged pink fluid    Post-Operative Diagnosis:  - Infiltrate in the RUL with \"tree in bud pattern\"  - Possible reflux related erythema of the laryngeal structures  - The airway examination was normal  - Right Upper lobe and Left upper lobe bronchial washings collected       Recent Data from [address]  Component 05/19/21 05/19/21    KOH PREP No Yeast or hyphal elements seen. Budding Yeast cells seen. Abnormal      MODIFIED AFB SMEAR- for Nocardia  Specimen:  Various culture specimens - Bronchial structure (body structure)  Component 8 d ago   MODIFIED AFB SMEAR  No partially Acid Fast Bacillus seen        Assessment         ICD-10-CM ICD-9-CM    1. Shortness of breath  R06.02 786.05 ECHO ADULT COMPLETE      NUCLEAR CARDIAC STRESS TEST      CANCELED: AMB POC EKG ROUTINE W/ 12 LEADS, INTER & REP    Multifactorial rule out cardiac etiology rule out LV dysfunction or valvular disease rule out ischemia   2. Essential hypertension  I10 401.9 ECHO ADULT COMPLETE      NUCLEAR CARDIAC STRESS TEST    Mildly elevated monitor dizziness at times split timing of lisinopril and amlodipine   3. Hyperlipidemia, unspecified hyperlipidemia type  E78.5 272.4     Continue statin lab with PCP       Medications Discontinued During This Encounter   Medication Reason    azithromycin (ZITHROMAX) 250 mg tablet Therapy Completed    methylPREDNISolone (MEDROL DOSEPACK) 4 mg tablet Not A Current Medication    fluticasone propionate (FLONASE) 50 mcg/actuation nasal spray Not A Current Medication       Orders Placed This Encounter    ECHO ADULT COMPLETE     Standing Status:   Future     Standing Expiration Date:   5/27/2022     Order Specific Question:   Contrast Enhancement (Bubble Study, Definity, Optison) may be used if criteria listed in established evidence-based protocol has been identified. Answer: Yes    aspirin delayed-release 81 mg tablet     Sig: Take 1 Tablet by mouth daily.      Dispense:  100 Tablet     Refill:  1

## 2021-06-10 DIAGNOSIS — E78.5 HYPERLIPIDEMIA LDL GOAL <130: ICD-10-CM

## 2021-06-10 RX ORDER — PRAVASTATIN SODIUM 20 MG/1
20 TABLET ORAL
Qty: 90 TABLET | Refills: 3 | Status: SHIPPED | OUTPATIENT
Start: 2021-06-10 | End: 2021-07-20

## 2021-06-10 NOTE — TELEPHONE ENCOUNTER
New Rx pended to [facility] as requested    Requested Prescriptions     Pending Prescriptions Disp Refills    pravastatin (PRAVACHOL) 20 mg tablet 90 Tablet 3     Sig: Take 1 Tablet by mouth nightly.

## 2021-06-10 NOTE — TELEPHONE ENCOUNTER
Patient wants 90 day supply to be sent to [facility].  Costs too much at [place]

## 2021-06-16 ENCOUNTER — OFFICE VISIT (OUTPATIENT)
Dept: CARDIOLOGY CLINIC | Age: 81
End: 2021-06-16
Payer: MEDICARE

## 2021-06-16 VITALS
DIASTOLIC BLOOD PRESSURE: 71 MMHG | OXYGEN SATURATION: 98 % | SYSTOLIC BLOOD PRESSURE: 160 MMHG | BODY MASS INDEX: 19.62 KG/M2 | TEMPERATURE: 97.3 F | HEART RATE: 59 BPM | WEIGHT: 106.6 LBS | HEIGHT: 62 IN

## 2021-06-16 DIAGNOSIS — I77.810 AORTIC ROOT DILATATION (HCC): ICD-10-CM

## 2021-06-16 DIAGNOSIS — E78.5 HYPERLIPIDEMIA, UNSPECIFIED HYPERLIPIDEMIA TYPE: ICD-10-CM

## 2021-06-16 DIAGNOSIS — I10 PRIMARY HYPERTENSION: ICD-10-CM

## 2021-06-16 DIAGNOSIS — R06.02 SHORTNESS OF BREATH: Primary | ICD-10-CM

## 2021-06-16 DIAGNOSIS — I35.1 NONRHEUMATIC AORTIC VALVE INSUFFICIENCY: ICD-10-CM

## 2021-06-16 DIAGNOSIS — I10 ESSENTIAL HYPERTENSION: ICD-10-CM

## 2021-06-16 PROCEDURE — 1101F PT FALLS ASSESS-DOCD LE1/YR: CPT | Performed by: INTERNAL MEDICINE

## 2021-06-16 PROCEDURE — 1090F PRES/ABSN URINE INCON ASSESS: CPT | Performed by: INTERNAL MEDICINE

## 2021-06-16 PROCEDURE — G8753 SYS BP > OR = 140: HCPCS | Performed by: INTERNAL MEDICINE

## 2021-06-16 PROCEDURE — G8399 PT W/DXA RESULTS DOCUMENT: HCPCS | Performed by: INTERNAL MEDICINE

## 2021-06-16 PROCEDURE — G8420 CALC BMI NORM PARAMETERS: HCPCS | Performed by: INTERNAL MEDICINE

## 2021-06-16 PROCEDURE — G8427 DOCREV CUR MEDS BY ELIG CLIN: HCPCS | Performed by: INTERNAL MEDICINE

## 2021-06-16 PROCEDURE — G8754 DIAS BP LESS 90: HCPCS | Performed by: INTERNAL MEDICINE

## 2021-06-16 PROCEDURE — G8432 DEP SCR NOT DOC, RNG: HCPCS | Performed by: INTERNAL MEDICINE

## 2021-06-16 PROCEDURE — 99214 OFFICE O/P EST MOD 30 MIN: CPT | Performed by: INTERNAL MEDICINE

## 2021-06-16 PROCEDURE — G8536 NO DOC ELDER MAL SCRN: HCPCS | Performed by: INTERNAL MEDICINE

## 2021-06-16 RX ORDER — AMLODIPINE BESYLATE 10 MG/1
10 TABLET ORAL DAILY
Qty: 90 TABLET | Refills: 3 | Status: SHIPPED | OUTPATIENT
Start: 2021-06-16 | End: 2021-12-17

## 2021-06-16 NOTE — PROGRESS NOTES
HISTORY OF PRESENT ILLNESS  Brayan Cano is a 79 y.o. female. 5/27/2021  Patient seen today for new patient evaluation. She is referred here for evaluation of shortness of breath and dyspnea. For about 1 year she has been having shortness of breath that is progressively getting worse. Recently had bronchoscopy that showed abnormality with fungal cells. She is on treatment under Dr. Ortiz Landon. She denies any chest pain. Denies orthopnea PND or peripheral edema. She has no prior cardiac history she has history of hypertension and hyperlipidemia on treatment. She has occasional dizziness likely related to antihypertensive medications    Follow-up  Associated symptoms include shortness of breath. Pertinent negatives include no chest pain, no abdominal pain and no headaches. Review of Systems   Constitutional: Negative for chills and fever. HENT: Negative for nosebleeds. Eyes: Negative for blurred vision and double vision. Respiratory: Positive for shortness of breath. Negative for cough, hemoptysis, sputum production and wheezing. Cardiovascular: Negative for chest pain, palpitations, orthopnea, claudication, leg swelling and PND. Gastrointestinal: Negative for abdominal pain, heartburn, nausea and vomiting. Musculoskeletal: Negative for myalgias. Skin: Negative for rash. Neurological: Negative for dizziness, weakness and headaches. Endo/Heme/Allergies: Does not bruise/bleed easily.      Family History   Problem Relation Age of Onset   [facility] Glaucoma Mother     Other Father         cerebral aneurysm    Hypertension Sister     Diabetes Sister     Hypertension Sister     Diabetes Paternal Uncle        Past Medical History:   Diagnosis Date    Diverticulosis of sigmoid colon 11/13/15    mild; Dr. Misbah Montero Eczema     Elevated cholesterol     Family history of diabetes mellitus (DM)     Glaucoma     Hyperlipidemia     Hypertension     Indigestion     Interpolated PVCs Past Surgical History:   Procedure Laterality Date    HX BREAST BIOPSY      HX HYSTERECTOMY  1981    STEPHAN/BSO    HX OOPHORECTOMY      right       Social History     Tobacco Use    Smoking status: Never Smoker    Smokeless tobacco: Never Used   Substance Use Topics    Alcohol use: No     Comment: with dinner a few days per week       No Known Allergies    Prior to Admission medications    Medication Sig Start Date End Date Taking? Authorizing Provider   amLODIPine (NORVASC) 10 mg tablet Take 1 Tablet by mouth daily. 6/16/21  Yes Carolyn Montes MD   pravastatin (PRAVACHOL) 20 mg tablet Take 1 Tablet by mouth nightly. 6/10/21  Yes Trudy Waller MD   psyllium husk (DAILY FIBER PO) Take  by mouth. Yes Provider, Historical   omeprazole (PRILOSEC) 20 mg capsule Take 20 mg by mouth daily. Yes Provider, Historical   aspirin delayed-release 81 mg tablet Take 1 Tablet by mouth daily. 5/27/21  Yes Carolyn Montes MD   lisinopril-hydroCHLOROthiazide Everrett Negus, ZESTORETIC) 20-25 mg per tablet TAKE 1 TABLET BY MOUTH  DAILY 3/29/21  Yes Trudy Waller MD   cyanocobalamin (Vitamin B-12) 1,000 mcg tablet Take 1,000 mcg by mouth daily. Yes Provider, Historical   folic acid/multivit-min/lutein (CENTRUM SILVER PO) Take  by mouth. Yes Provider, Historical   bimatoprost (LUMIGAN) 0.03 % ophthalmic drops Administer 1 Drop to both eyes every evening. Yes Provider, Historical   DORZOLAMIDE HCL/TIMOLOL MALEAT (DORZOLAMIDE-TIMOLOL OP) Apply  to eye. Yes Provider, Historical   CALCIUM CARBONATE/VITAMIN D3 (CALCIUM WITH VITAMIN D PO) Take  by mouth. Yes Provider, Historical         Visit Vitals  BP (!) 160/71 (BP 1 Location: Right arm, BP Patient Position: Sitting, BP Cuff Size: Adult)   Pulse (!) 59   Temp 97.3 °F (36.3 °C) (Temporal)   Ht 5' 2\" (1.575 m)   Wt 48.4 kg (106 lb 9.6 oz)   SpO2 98%   BMI 19.50 kg/m²         Physical Exam  Constitutional:       Appearance: She is well-developed.    HENT:      Head: Normocephalic and atraumatic. Eyes:      Conjunctiva/sclera: Conjunctivae normal.   Neck:      Thyroid: No thyromegaly. Vascular: No JVD. Trachea: No tracheal deviation. Cardiovascular:      Rate and Rhythm: Normal rate and regular rhythm. Chest Wall: PMI is not displaced. Pulses: No decreased pulses. Heart sounds: No murmur heard. No gallop. No S3 sounds. Pulmonary:      Effort: No respiratory distress. Breath sounds: No wheezing or rales. Chest:      Chest wall: No tenderness. Abdominal:      Palpations: Abdomen is soft. Tenderness: There is no abdominal tenderness. Musculoskeletal:      Cervical back: Neck supple. Skin:     General: Skin is warm. Neurological:      Mental Status: She is alert and oriented to person, place, and time. Ms. Aric Bustamante has a reminder for a \"due or due soon\" health maintenance. I have asked that she contact her primary care provider for follow-up on this health maintenance. No flowsheet data found. I have personally reviewed patient's records available from hospital and other providers and incorporated findings in patient care. Provider Notes, lab results, EKG, CT scan chest and CT any head and neck  CTA NECK VASCULAR ANALYSIS: 4/2021     Aortic arch: Mildly ectatic ascending aorta measuring 4 cm. Bronchoscpy-5/2021  Findings: The nasopharynx/oropharynx appears normal. The larynx appears normal except fro mild erythema dn edema. the arytenoids appeared erythematous and inflammed? reflux. The vocal cords appear normal. The subglottic space is normal. The trachea is of normal caliber. The royer is sharp. The tracheobronchial tree was examined to at least the first subsegmental level. Bronchial mucosa and anatomy are normal; there are no endobronchial lesions, and minimal clear secretions.  Bronchial washings taken from the RUL posterior segment and CHELSI with return of blood tinged pink fluid    Post-Operative Diagnosis:  - Infiltrate in the RUL with \"tree in bud pattern\"  - Possible reflux related erythema of the laryngeal structures  - The airway examination was normal  - Right Upper lobe and Left upper lobe bronchial washings collected       Recent Data from [address]  Component 05/19/21 05/19/21    KOH PREP No Yeast or hyphal elements seen. Budding Yeast cells seen. Abnormal      MODIFIED AFB SMEAR- for Nocardia  Specimen:  Various culture specimens - Bronchial structure (body structure)  Component 8 d ago   MODIFIED AFB SMEAR  No partially Acid Fast Bacillus seen      Interpretation Summary 6/2021 echo    · LV: Estimated LVEF is 55 - 60%. Normal cavity size, wall thickness and systolic function (ejection fraction normal). Wall motion: normal. Mild (grade 1) left ventricular diastolic dysfunction. · AV: Mild aortic valve regurgitation is present. · TV: Right Ventricular Arterial Pressure (RVSP) is 25 mmHg. · AO: Mild aortic root and ascending aorta dilatation. Aortic root diameter = 3.9 cm. Ascending aorta diameter = 4 cm. Procedure Conclusion 62,021    Nuclear Stress Test    Nuclear Cardiac Spect Rest then Gated Stress with tomographic imaging/tomography utilized for the tomographic myocardical perfusion imaging performed. Exercise was used as the stressing method and agent. One day myocardial perfusion study. Date: 6/4/2021. Left ventricular perfusion is normal. Myocardial perfusion imaging supports a low risk stress test.   There is no prior study available for comparison. . This Single Photon Emission Computer Tomograph (SPECT) study utilized tomographic imaging/tomography for the tomographic myocardial perfusion imaging performed during this study. Assessment         ICD-10-CM ICD-9-CM    1. Shortness of breath  R06.02 786.05     Multifactorial.  Negative nuclear stress test monitor clinically. Valvular disease mild.    2. Aortic root dilatation (HCC) I77.810 447.71     Mild monitor   3. Nonrheumatic aortic valve insufficiency  I35.1 424.1     Mild continue monitoring   4. Essential hypertension  I10 401.9     Elevated blood pressure increased. Increase amlodipine to 10 mg a day   5. Hyperlipidemia, unspecified hyperlipidemia type  E78.5 272.4     Continue treatment lab with PCP   6. Primary hypertension  I10 401.9 amLODIPine (NORVASC) 10 mg tablet   6/2021  Seen for follow-up. Shortness of breath on exertion is unchanged. Mild aortic regurgitation and aortic root dilatation. Blood pressure is elevated I will increase amlodipine to 10 mg a day. Continue other treatment    Medications Discontinued During This Encounter   Medication Reason    psyllium (METAMUCIL) powd Not A Current Medication    amLODIPine (NORVASC) 5 mg tablet        Orders Placed This Encounter    amLODIPine (NORVASC) 10 mg tablet     Sig: Take 1 Tablet by mouth daily. Dispense:  90 Tablet     Refill:  3       Follow-up and Dispositions    · Return in about 6 months (around 12/16/2021).

## 2021-06-16 NOTE — PROGRESS NOTES
1. Have you been to the ER, urgent care clinic since your last visit? Hospitalized since your last visit? No    2. Have you seen or consulted any other health care providers outside of the [address] [place] [address] since your last visit? Include any pap smears or colon screening.  No

## 2021-06-21 ENCOUNTER — TELEPHONE (OUTPATIENT)
Dept: INTERNAL MEDICINE CLINIC | Age: 81
End: 2021-06-21

## 2021-06-21 NOTE — TELEPHONE ENCOUNTER
Biology lab at [facility] stated the Asb culture from a [facility] wash collected on may 19th shows the patient has asb.

## 2021-06-21 NOTE — TELEPHONE ENCOUNTER
Spoke with lab at [facility], they were informed that we are not the ordering provider. They need to notify DR. Tray Almanza

## 2021-06-29 NOTE — PROGRESS NOTES
Dorian Jean presents today for   Chief Complaint   Patient presents with    Follow-up     3 month f/u              Coordination of Care:  1. Have you been to the ER, urgent care clinic since your last visit? Hospitalized since your last visit? no    2. Have you seen or consulted any other health care providers outside of the [address] [place] [address] since your last visit? Include any pap smears or colon screening.  yes

## 2021-06-30 ENCOUNTER — OFFICE VISIT (OUTPATIENT)
Dept: INTERNAL MEDICINE CLINIC | Age: 81
End: 2021-06-30
Payer: MEDICARE

## 2021-06-30 ENCOUNTER — TELEPHONE (OUTPATIENT)
Dept: INTERNAL MEDICINE CLINIC | Age: 81
End: 2021-06-30

## 2021-06-30 VITALS
BODY MASS INDEX: 19.14 KG/M2 | HEART RATE: 56 BPM | HEIGHT: 62 IN | DIASTOLIC BLOOD PRESSURE: 64 MMHG | OXYGEN SATURATION: 99 % | SYSTOLIC BLOOD PRESSURE: 142 MMHG | TEMPERATURE: 97.6 F | RESPIRATION RATE: 12 BRPM | WEIGHT: 104 LBS

## 2021-06-30 DIAGNOSIS — M54.30 SCIATICA, UNSPECIFIED LATERALITY: ICD-10-CM

## 2021-06-30 DIAGNOSIS — K59.09 CHRONIC CONSTIPATION: ICD-10-CM

## 2021-06-30 DIAGNOSIS — R89.9 ACID-FAST BACTERIA PRESENT: ICD-10-CM

## 2021-06-30 DIAGNOSIS — R06.09 DOE (DYSPNEA ON EXERTION): Primary | ICD-10-CM

## 2021-06-30 DIAGNOSIS — E78.5 HYPERLIPIDEMIA LDL GOAL <130: ICD-10-CM

## 2021-06-30 DIAGNOSIS — I10 ESSENTIAL HYPERTENSION: ICD-10-CM

## 2021-06-30 DIAGNOSIS — R73.9 HYPERGLYCEMIA: ICD-10-CM

## 2021-06-30 PROCEDURE — G0463 HOSPITAL OUTPT CLINIC VISIT: HCPCS | Performed by: INTERNAL MEDICINE

## 2021-06-30 PROCEDURE — G8427 DOCREV CUR MEDS BY ELIG CLIN: HCPCS | Performed by: INTERNAL MEDICINE

## 2021-06-30 PROCEDURE — G8399 PT W/DXA RESULTS DOCUMENT: HCPCS | Performed by: INTERNAL MEDICINE

## 2021-06-30 PROCEDURE — G8753 SYS BP > OR = 140: HCPCS | Performed by: INTERNAL MEDICINE

## 2021-06-30 PROCEDURE — 1090F PRES/ABSN URINE INCON ASSESS: CPT | Performed by: INTERNAL MEDICINE

## 2021-06-30 PROCEDURE — G8536 NO DOC ELDER MAL SCRN: HCPCS | Performed by: INTERNAL MEDICINE

## 2021-06-30 PROCEDURE — G8754 DIAS BP LESS 90: HCPCS | Performed by: INTERNAL MEDICINE

## 2021-06-30 PROCEDURE — G8432 DEP SCR NOT DOC, RNG: HCPCS | Performed by: INTERNAL MEDICINE

## 2021-06-30 PROCEDURE — 99214 OFFICE O/P EST MOD 30 MIN: CPT | Performed by: INTERNAL MEDICINE

## 2021-06-30 PROCEDURE — G8420 CALC BMI NORM PARAMETERS: HCPCS | Performed by: INTERNAL MEDICINE

## 2021-06-30 PROCEDURE — 1101F PT FALLS ASSESS-DOCD LE1/YR: CPT | Performed by: INTERNAL MEDICINE

## 2021-06-30 NOTE — TELEPHONE ENCOUNTER
MD Dustin Byers LPN  Please call her in 2 wks and see if her myalgias resolved with holding her pravastatin. If they did not resolve, please have her restart her pravastatin and let me know. If her myalgias resolve with stopping pravastatin, let me know for further instructions.      Thanks,   Dr. Stacy Berrios   [facility] [address] [place] [address], [address] [place] [address], [facility]   Phone: [phone]   Fax: [phone]

## 2021-06-30 NOTE — PROGRESS NOTES
INTERNISTS [facility]:  6/30/2021, MRN: [id number]      Oscar Kwan is a 79 y.o. female and presents to clinic for Follow-up (3 month f/u)      Subjective: The pt is a 80 yo female with h/o prediabetes, HLD, eczema per EHR, OA, HTN (followed by Maryjo Colin with Cardiology), sciatica, positive sputum for AFB (2021), and glaucoma. 1. JOHNSON: Reported at her last apt. Her CXR and EKG were unremarkable except for bradycardia. Followed by Maryjo Colin with  Cardiology and Pulmonology. S/p bronchoscopy. +Sputum is positive for AFP. Final cx results are pending. She was prescribed abx course. +Weight loss. 4/20/21 Chest CTA: Multiple scattered parenchymal lung nodules with apparent close association with bronchial tree. Bilateral tree-in-bud type nodular densities are most apparent in the superior segments of both lower globes. Favor infectious process such as MAIC, viral or fungal process, vs. nonspecific inflammatory process, i.e. connective-tissue disease disease. Recommend short-term follow-up CT after appropriate empirical treatment in 3-6 months    2. Sciatica: Pain is 5/10. She had some improvement with taking a course of steroid rx. Pain is positional and triggered by ambulation. Her pain is mostly along b/l lower back areas. Pain is worse along her left lower back vs right. 3. HTN: Taking lisinopril-HCTZ and amlodipine. Her BP has improved. Her norvasc was increased at her last apt due to her pressure being elevated. 4. Chronic Constipation: She is scheduled with GI tomorrow. She was put on a \"fiber pill. \" The fiber pill (psyllium) did not resolve her sx. 5. HLD: She gets myalgias off/on in her calf areas. On pravastatin.        Patient Active Problem List    Diagnosis Date Noted    Aortic root dilatation ([address] 06/16/2021    Nonrheumatic aortic valve insufficiency 06/16/2021    Impaired fasting glucose 02/21/2018    Raynaud's disease without gangrene 05/15/2017    Hyperlipidemia LDL goal <130 03/23/2016    Essential hypertension 04/20/2015    Generalized osteoarthrosis, involving multiple sites 05/23/2012    Eczema     Glaucoma        Current Outpatient Medications   Medication Sig Dispense Refill    amLODIPine (NORVASC) 10 mg tablet Take 1 Tablet by mouth daily. 90 Tablet 3    pravastatin (PRAVACHOL) 20 mg tablet Take 1 Tablet by mouth nightly. 90 Tablet 3    omeprazole (PRILOSEC) 20 mg capsule Take 20 mg by mouth daily.  aspirin delayed-release 81 mg tablet Take 1 Tablet by mouth daily. 100 Tablet 1    lisinopril-hydroCHLOROthiazide (PRINZIDE, ZESTORETIC) 20-25 mg per tablet TAKE 1 TABLET BY MOUTH  DAILY 90 Tab 1    cyanocobalamin (Vitamin B-12) 1,000 mcg tablet Take 1,000 mcg by mouth daily.  folic acid/multivit-min/lutein (CENTRUM SILVER PO) Take  by mouth.  bimatoprost (LUMIGAN) 0.03 % ophthalmic drops Administer 1 Drop to both eyes every evening.  DORZOLAMIDE HCL/TIMOLOL MALEAT (DORZOLAMIDE-TIMOLOL OP) Apply  to eye.  CALCIUM CARBONATE/VITAMIN D3 (CALCIUM WITH VITAMIN D PO) Take  by mouth.  psyllium husk (DAILY FIBER PO) Take  by mouth.  (Patient not taking: Reported on 6/30/2021)         No Known Allergies    Past Medical History:   Diagnosis Date    Diverticulosis of sigmoid colon 11/13/15    mild; Dr. Kyara Herrera Eczema     Elevated cholesterol     Family history of diabetes mellitus (DM)     Glaucoma     Hyperlipidemia     Hypertension     Indigestion     Interpolated PVCs        Past Surgical History:   Procedure Laterality Date    HX BREAST BIOPSY      HX HYSTERECTOMY  1981    STEPHAN/BSO    HX OOPHORECTOMY      right       Family History   Problem Relation Age of Onset   [place] Glaucoma Mother     Other Father         cerebral aneurysm    Hypertension Sister     Diabetes Sister     Hypertension Sister     Diabetes Paternal Uncle        Social History     Tobacco Use    Smoking status: Never Smoker    Smokeless tobacco: Never Used Substance Use Topics    Alcohol use: No     Comment: with dinner a few days per week       ROS   Review of Systems   Constitutional: Positive for malaise/fatigue and weight loss. Negative for chills and fever. HENT: Negative for ear pain and sore throat. Eyes: Negative for blurred vision and pain. Respiratory: Positive for shortness of breath (JOHNSON unchanged). Negative for cough. Cardiovascular: Negative for chest pain. Gastrointestinal: Negative for abdominal pain, blood in stool and melena. Genitourinary: Negative for dysuria and hematuria. Musculoskeletal: Positive for joint pain and myalgias. Skin: Negative for rash. Neurological: Negative for headaches. Endo/Heme/Allergies: Does not bruise/bleed easily. Psychiatric/Behavioral: Negative for substance abuse. Objective     Vitals:    06/30/21 1022 06/30/21 1027   BP: (!) 140/57 (!) 142/64   Pulse: (!) 54 (!) 56   Resp: 12    Temp: 97.6 °F (36.4 °C)    TempSrc: Temporal    SpO2: 99%    Weight: 104 lb (47.2 kg)    Height: 5' 2\" (1.575 m)    PainSc:   5    PainLoc: Generalized        Physical Exam  Vitals and nursing note reviewed. HENT:      Head: Normocephalic and atraumatic. Right Ear: External ear normal.      Left Ear: External ear normal.   Eyes:      General: No scleral icterus. Right eye: No discharge. Left eye: No discharge. Conjunctiva/sclera: Conjunctivae normal.   Cardiovascular:      Rate and Rhythm: Normal rate and regular rhythm. Heart sounds: Normal heart sounds. No murmur heard. No friction rub. No gallop. Pulmonary:      Effort: Pulmonary effort is normal. No respiratory distress. Breath sounds: Normal breath sounds. No wheezing or rales. Abdominal:      General: Bowel sounds are normal. There is no distension. Palpations: Abdomen is soft. There is no mass. Tenderness: There is no abdominal tenderness. There is no guarding or rebound.    Musculoskeletal: General: No swelling (symmetric +1 edema along her ankles) or tenderness (BUE). Cervical back: Neck supple. Lymphadenopathy:      Cervical: No cervical adenopathy. Skin:     General: Skin is warm and dry. Findings: No erythema. Neurological:      Mental Status: She is alert and oriented to person, place, and time. Motor: No abnormal muscle tone. Gait: Gait is intact. Gait normal.   Psychiatric:         Mood and Affect: Mood and affect normal.         LABS   Data Review:   Lab Results   Component Value Date/Time    WBC 6.6 02/17/2021 10:42 AM    WBC 6.8 05/16/2012 09:03 AM    HGB 12.4 02/17/2021 10:42 AM    HCT 35.4 02/17/2021 10:42 AM    PLATELET 471 81/86/6466 10:42 AM    MCV 89.4 02/17/2021 10:42 AM       Lab Results   Component Value Date/Time    Sodium 131 (L) 02/17/2021 10:42 AM    Potassium 3.6 02/17/2021 10:42 AM    Chloride 95 (L) 02/17/2021 10:42 AM    CO2 27 02/17/2021 10:42 AM    Anion gap 9 02/17/2021 10:42 AM    Glucose 115 (H) 02/17/2021 10:42 AM    BUN 11 02/17/2021 10:42 AM    Creatinine 0.67 02/17/2021 10:42 AM    BUN/Creatinine ratio 16 02/17/2021 10:42 AM    GFR est AA >60 02/17/2021 10:42 AM    GFR est non-AA >60 02/17/2021 10:42 AM    Calcium 8.4 (L) 02/17/2021 10:42 AM       Lab Results   Component Value Date/Time    Cholesterol, total 244 (H) 02/17/2021 10:42 AM    HDL Cholesterol 61 (H) 02/17/2021 10:42 AM    LDL, calculated 164 (H) 02/17/2021 10:42 AM    VLDL, calculated 19 02/17/2021 10:42 AM    Triglyceride 95 02/17/2021 10:42 AM    CHOL/HDL Ratio 4.0 02/17/2021 10:42 AM       Lab Results   Component Value Date/Time    Hemoglobin A1c 5.9 (H) 02/17/2021 10:42 AM       Assessment/Plan:   1. JOHNSON: Positive AFB.   - F/u w/ Pulmonology. Requesting records. - Checking a f/u CRP    2. HTN: Stable. - F/u w/ Cardiology recommended. - C/w rx as prescribed. 3. Prediabetes:   - Low carb diet recommended. - Checking an A1C    4. HLD: +Myalgias.  From statin?  - Hold statin for 2 wks and we will call her to see if her myalgias resolve. If they do, she should be on a smaller pravastatin dose or new statin rx. If her myalgias don't resolve, she needs to restart her statin rx. 5.Chronic Constipation: Despite taking psyllium.  - I encouraged her to go to her scheduled apt with GI for further testing/rx recommendations. 6. Lower Back Pain:   - Activity as tolerated. - Observation. Sx relief with OTC rx       Health Maintenance Due   Topic Date Due    Hepatitis C Screening  Never done    Shingrix Vaccine Age 50> (1 of 2) Never done         Lab review: labs are reviewed in the EHR and ordered as mentioned above    I have discussed the diagnosis with the patient and the intended plan as seen in the above orders. The patient has received an after-visit summary and questions were answered concerning future plans. I have discussed medication side effects and warnings with the patient as well. I have reviewed the plan of care with the patient, accepted their input and they are in agreement with the treatment goals. All questions were answered. The patient understands the plan of care. Handouts provided today with above information. Pt instructed if symptoms worsen to call the office or report to the ED for continued care. Greater than 50% of the visit time was spent in counseling and/or coordination of care. Voice recognition was used to generate this report, which may have resulted in some phonetic based errors in grammar and contents. Even though attempts were made to correct all the mistakes, some may have been missed, and remained in the body of the document.           Paul Gardner MD

## 2021-06-30 NOTE — PATIENT INSTRUCTIONS
Shortness of Breath: Care Instructions  Your Care Instructions     Shortness of breath has many causes. Sometimes conditions such as anxiety can lead to shortness of breath. Some people get mild shortness of breath when they exercise. Trouble breathing also can be a symptom of a serious problem, such as asthma, lung disease, emphysema, heart problems, and pneumonia. If your shortness of breath continues, you may need tests and treatment. Watch for any changes in your breathing and other symptoms. Follow-up care is a key part of your treatment and safety. Be sure to make and go to all appointments, and call your doctor if you are having problems. It's also a good idea to know your test results and keep a list of the medicines you take. How can you care for yourself at home? · Do not smoke or allow others to smoke around you. If you need help quitting, talk to your doctor about stop-smoking programs and medicines. These can increase your chances of quitting for good. · Get plenty of rest and sleep. · Take your medicines exactly as prescribed. Call your doctor if you think you are having a problem with your medicine. · Find healthy ways to deal with stress. ? Exercise daily. ? Get plenty of sleep. ? Eat regularly and well. When should you call for help? Call 911 anytime you think you may need emergency care. For example, call if:    · You have severe shortness of breath.     · You have symptoms of a heart attack. These may include:  ? Chest pain or pressure, or a strange feeling in the chest.  ? Sweating. ? Shortness of breath. ? Nausea or vomiting. ? Pain, pressure, or a strange feeling in the back, neck, jaw, or upper belly or in one or both shoulders or arms. ? Lightheadedness or sudden weakness. ? A fast or irregular heartbeat. After you call 911, the  may tell you to chew 1 adult-strength or 2 to 4 low-dose aspirin. Wait for an ambulance. Do not try to drive yourself.    Call your doctor now or seek immediate medical care if:    · Your shortness of breath gets worse or you start to wheeze. Wheezing is a high-pitched sound when you breathe.     · You wake up at night out of breath or have to prop your head up on several pillows to breathe.     · You are short of breath after only light activity or while at rest.   Watch closely for changes in your health, and be sure to contact your doctor if:    · You do not get better over the next 1 to 2 days. Where can you learn more? Go to http://www.gray.com/  Enter S780 in the search box to learn more about \"Shortness of Breath: Care Instructions. \"  Current as of: October 26, 2020               Content Version: 12.8  © 2006-2021 [employer]wise, [employer]. Care instructions adapted under license by [employer] (which disclaims liability or warranty for this information). If you have questions about a medical condition or this instruction, always ask your healthcare professional. [place] [address] any warranty or liability for your use of this information.

## 2021-07-06 ENCOUNTER — TELEPHONE (OUTPATIENT)
Dept: CARDIOLOGY CLINIC | Age: 81
End: 2021-07-06

## 2021-07-07 NOTE — TELEPHONE ENCOUNTER
Ankle swelling is likely related to change in amlodipine dose. Continue to monitor.   Monitor blood pressure also

## 2021-07-07 NOTE — TELEPHONE ENCOUNTER
Called patient and let know ankle swelling is likely related to change in dose of amlodipine. Monitor swelling and BP, let us know if worsen or stays the same. Patient verbalized understanding and had no further questions at this time.

## 2021-07-20 RX ORDER — EZETIMIBE 10 MG/1
10 TABLET ORAL DAILY
Qty: 30 TABLET | Refills: 11 | Status: SHIPPED | OUTPATIENT
Start: 2021-07-20 | End: 2021-12-17

## 2021-07-20 NOTE — TELEPHONE ENCOUNTER
Please let her know that I am ordering Zetia in place of pravastatin. I am adding pravastatin as a medication intolerance to her chart.     Dr. Nenita Avina  [facility], [address] [place] [address], [address] [place] [address].  Phone: [phone]  Fax: [phone]

## 2021-11-01 DIAGNOSIS — I10 PRIMARY HYPERTENSION: ICD-10-CM

## 2021-11-01 DIAGNOSIS — R73.9 HYPERGLYCEMIA: Primary | ICD-10-CM

## 2021-11-04 RX ORDER — LISINOPRIL AND HYDROCHLOROTHIAZIDE 20; 25 MG/1; MG/1
TABLET ORAL
Qty: 90 TABLET | Refills: 1 | Status: SHIPPED | OUTPATIENT
Start: 2021-11-04 | End: 2022-01-24 | Stop reason: SDUPTHER

## 2021-11-04 NOTE — TELEPHONE ENCOUNTER
Please schedule her for a 30 min in office apt with me in April. Please schedule for labs 1 wk before her apt. Also schedule her for labs this month to have her prediabetes reassessed with blood work.     Dr. Lorena Dial  [facility], [address] [place] [address], [address] [place] [address] Str.  Phone: [phone]  Fax: [phone]

## 2021-11-04 NOTE — TELEPHONE ENCOUNTER
appt scheduled for April 2022    Per pt she will get labs done this month  At [facility].

## 2021-11-05 ENCOUNTER — HOSPITAL ENCOUNTER (OUTPATIENT)
Dept: LAB | Age: 81
Discharge: HOME | End: 2021-11-05
Payer: MEDICARE

## 2021-11-05 DIAGNOSIS — R73.9 HYPERGLYCEMIA: ICD-10-CM

## 2021-11-05 DIAGNOSIS — I10 PRIMARY HYPERTENSION: ICD-10-CM

## 2021-11-05 LAB
ALBUMIN SERPL-MCNC: 3.7 G/DL (ref 3.4–5)
ALBUMIN/GLOB SERPL: 1.1 {RATIO} (ref 0.8–1.7)
ALP SERPL-CCNC: 57 U/L (ref 45–117)
ALT SERPL-CCNC: 28 U/L (ref 13–56)
ANION GAP SERPL CALC-SCNC: 7 MMOL/L (ref 3–18)
AST SERPL-CCNC: 21 U/L (ref 10–38)
BILIRUB SERPL-MCNC: 0.5 MG/DL (ref 0.2–1)
BUN SERPL-MCNC: 16 MG/DL (ref 7–18)
BUN/CREAT SERPL: 23 (ref 12–20)
CALCIUM SERPL-MCNC: 9.3 MG/DL (ref 8.5–10.1)
CHLORIDE SERPL-SCNC: 97 MMOL/L (ref 100–111)
CHOLEST SERPL-MCNC: 229 MG/DL
CO2 SERPL-SCNC: 29 MMOL/L (ref 21–32)
CREAT SERPL-MCNC: 0.7 MG/DL (ref 0.6–1.3)
CREAT UR-MCNC: 92 MG/DL (ref 30–125)
EST. AVERAGE GLUCOSE BLD GHB EST-MCNC: 134 MG/DL
GLOBULIN SER CALC-MCNC: 3.3 G/DL (ref 2–4)
GLUCOSE SERPL-MCNC: 128 MG/DL (ref 74–99)
HBA1C MFR BLD: 6.3 % (ref 4.2–5.6)
HDLC SERPL-MCNC: 60 MG/DL (ref 40–60)
HDLC SERPL: 3.8 {RATIO} (ref 0–5)
LDLC SERPL CALC-MCNC: 149.4 MG/DL (ref 0–100)
LIPID PROFILE,FLP: ABNORMAL
MICROALBUMIN UR-MCNC: 110 MG/DL (ref 0–3)
MICROALBUMIN/CREAT UR-RTO: 1196 MG/G (ref 0–30)
POTASSIUM SERPL-SCNC: 4 MMOL/L (ref 3.5–5.5)
PROT SERPL-MCNC: 7 G/DL (ref 6.4–8.2)
SODIUM SERPL-SCNC: 133 MMOL/L (ref 136–145)
TRIGL SERPL-MCNC: 98 MG/DL (ref ?–150)
VLDLC SERPL CALC-MCNC: 19.6 MG/DL

## 2021-11-05 PROCEDURE — 80053 COMPREHEN METABOLIC PANEL: CPT

## 2021-11-05 PROCEDURE — 82043 UR ALBUMIN QUANTITATIVE: CPT

## 2021-11-05 PROCEDURE — 80061 LIPID PANEL: CPT

## 2021-11-05 PROCEDURE — 36415 COLL VENOUS BLD VENIPUNCTURE: CPT

## 2021-11-05 PROCEDURE — 83036 HEMOGLOBIN GLYCOSYLATED A1C: CPT

## 2021-11-26 ENCOUNTER — TELEPHONE (OUTPATIENT)
Dept: INTERNAL MEDICINE CLINIC | Age: 81
End: 2021-11-26

## 2021-11-26 NOTE — TELEPHONE ENCOUNTER
[facility] re: message below      Gi Darling MD  [address] Office  Please have her schedule for an in office or virtual visit with me to discuss her recent lab results (30 min apt).

## 2021-12-02 ENCOUNTER — TELEPHONE (OUTPATIENT)
Dept: INTERNAL MEDICINE CLINIC | Age: 81
End: 2021-12-02

## 2021-12-02 NOTE — TELEPHONE ENCOUNTER
Called pt and advised not to give any information to a caller over phone. She said they person calling says they are from [employer] but they are asking for all of her information. She does not have caller id to get the number they are calling from. She will continue to hang up on the caller if they call again.

## 2021-12-02 NOTE — TELEPHONE ENCOUNTER
----- Message from Alyx Longo sent at 12/2/2021  9:09 AM EST -----  Subject: Message to Provider    QUESTIONS  Information for Provider? Patient states someone is calling stating they   are the insurance company and asking for personal information. Patient   would like to know if office has been contacted and would like advise on   how to proceed. Patient requesting a call back. ---------------------------------------------------------------------------  --------------  [facility]  What is the best way for the office to contact you? OK to leave message on   voicemail  Preferred Call Back Phone Number? [phone]  ---------------------------------------------------------------------------  --------------  SCRIPT ANSWERS  Relationship to Patient?  Self

## 2021-12-16 NOTE — PROGRESS NOTES
Lyndsey Worrell presents today for   Chief Complaint   Patient presents with    Follow-up    Labs     11-15-21          1. \"Have you been to the ER, urgent care clinic since your last visit? Hospitalized since your last visit? \" {no    2. \"Have you seen or consulted any other health care providers outside of the [address] [place] [address] since your last visit? \" yes

## 2021-12-17 ENCOUNTER — OFFICE VISIT (OUTPATIENT)
Dept: INTERNAL MEDICINE CLINIC | Age: 81
End: 2021-12-17
Payer: MEDICARE

## 2021-12-17 VITALS
OXYGEN SATURATION: 98 % | RESPIRATION RATE: 14 BRPM | TEMPERATURE: 97.9 F | WEIGHT: 99 LBS | DIASTOLIC BLOOD PRESSURE: 68 MMHG | BODY MASS INDEX: 18.22 KG/M2 | SYSTOLIC BLOOD PRESSURE: 148 MMHG | HEART RATE: 65 BPM | HEIGHT: 62 IN

## 2021-12-17 DIAGNOSIS — R91.1 PULMONARY NODULE: ICD-10-CM

## 2021-12-17 DIAGNOSIS — I10 ESSENTIAL HYPERTENSION: ICD-10-CM

## 2021-12-17 DIAGNOSIS — R93.89 ABNORMAL CHEST CT: Primary | ICD-10-CM

## 2021-12-17 DIAGNOSIS — E78.5 HYPERLIPIDEMIA LDL GOAL <130: ICD-10-CM

## 2021-12-17 DIAGNOSIS — R06.09 DOE (DYSPNEA ON EXERTION): ICD-10-CM

## 2021-12-17 DIAGNOSIS — R89.9 ACID-FAST BACTERIA PRESENT: ICD-10-CM

## 2021-12-17 DIAGNOSIS — R73.9 HYPERGLYCEMIA: ICD-10-CM

## 2021-12-17 DIAGNOSIS — R93.89 ABNORMAL CHEST CT: ICD-10-CM

## 2021-12-17 PROCEDURE — 99214 OFFICE O/P EST MOD 30 MIN: CPT | Performed by: INTERNAL MEDICINE

## 2021-12-17 PROCEDURE — G8536 NO DOC ELDER MAL SCRN: HCPCS | Performed by: INTERNAL MEDICINE

## 2021-12-17 PROCEDURE — 1090F PRES/ABSN URINE INCON ASSESS: CPT | Performed by: INTERNAL MEDICINE

## 2021-12-17 PROCEDURE — G8754 DIAS BP LESS 90: HCPCS | Performed by: INTERNAL MEDICINE

## 2021-12-17 PROCEDURE — G8753 SYS BP > OR = 140: HCPCS | Performed by: INTERNAL MEDICINE

## 2021-12-17 PROCEDURE — G8427 DOCREV CUR MEDS BY ELIG CLIN: HCPCS | Performed by: INTERNAL MEDICINE

## 2021-12-17 PROCEDURE — 1101F PT FALLS ASSESS-DOCD LE1/YR: CPT | Performed by: INTERNAL MEDICINE

## 2021-12-17 PROCEDURE — G8399 PT W/DXA RESULTS DOCUMENT: HCPCS | Performed by: INTERNAL MEDICINE

## 2021-12-17 PROCEDURE — G8419 CALC BMI OUT NRM PARAM NOF/U: HCPCS | Performed by: INTERNAL MEDICINE

## 2021-12-17 PROCEDURE — G0463 HOSPITAL OUTPT CLINIC VISIT: HCPCS | Performed by: INTERNAL MEDICINE

## 2021-12-17 PROCEDURE — G8510 SCR DEP NEG, NO PLAN REQD: HCPCS | Performed by: INTERNAL MEDICINE

## 2021-12-17 RX ORDER — LISINOPRIL 20 MG/1
20 TABLET ORAL DAILY
Qty: 90 TABLET | Refills: 3 | Status: CANCELLED | OUTPATIENT
Start: 2021-12-17

## 2021-12-17 RX ORDER — NEOMYCIN SULFATE, POLYMYXIN B SULFATE, AND DEXAMETHASONE 3.5; 10000; 1 MG/G; [USP'U]/G; MG/G
OINTMENT OPHTHALMIC
COMMUNITY
Start: 2021-12-14 | End: 2022-03-30

## 2021-12-17 NOTE — PROGRESS NOTES
[facility]:  12/17/2021, MRN: [id number]      Hannah Diamond is a [de-identified] y.o. female and presents to clinic for Follow-up and Labs (11-15-21)    Subjective: The pt is a [de-identified] yo female with h/o prediabetes, HLD, eczema per EHR, OA, HTN (followed by  with Cardiology), sciatica, positive sputum for AFB (2021), and glaucoma. 1.  Hypertension: Blood pressure is 148/68. She is on HCTZ-lisinopril and amlodipine. Her weight is down to 99 pounds from 104 pounds. +Microalbuminuria per her recent labs. Wt Readings from Last 3 Encounters:   12/17/21 99 lb (44.9 kg)   06/30/21 104 lb (47.2 kg)   06/16/21 106 lb 9.6 oz (48.4 kg)     BP Readings from Last 3 Encounters:   12/17/21 (!) 148/68   06/30/21 (!) 142/64   06/16/21 (!) 160/71     2. Prediabetes: Her A1c was 5.9 in February. It is now 6.3. She eats very healthy but consumes lentils. 3.  Hyperlipidemia: She had myalgias with use of pravastatin. Her November labs show that her LDL is 149. Zetia was ordered but she is not sure if she is taking it. 4. JOHNSON:  Followed by Pulmonology. She is followed Dr. Mauricio Staff with Cardiology as well. She has a history of bradycardia. She had sputum from a bronchoscopy that was positive for AFP. Today she reports no JOHNSON sx recently. +Losing weight. Weight is down to 99lbs from 104lbs as mentioned above.      4/20/21 Chest CTA: Multiple scattered parenchymal lung nodules with apparent close association with bronchial tree.  Bilateral tree-in-bud type nodular densities are most apparent in the superior segments of both lower globes.  Favor infectious process such as MAIC, viral or fungal process, vs. nonspecific inflammatory process, i.e. connective-tissue disease disease.  Recommend short-term follow-up CT after appropriate empirical treatment in 3-6 months      Patient Active Problem List    Diagnosis Date Noted    Acid-fast bacteria present 06/30/2021    Chronic constipation 06/30/2021    Aortic root dilatation ([facility] [address] 06/16/2021    Nonrheumatic aortic valve insufficiency 06/16/2021    Impaired fasting glucose 02/21/2018    Raynaud's disease without gangrene 05/15/2017    Hyperlipidemia LDL goal <130 03/23/2016    Essential hypertension 04/20/2015    Generalized osteoarthrosis, involving multiple sites 05/23/2012    Eczema     Glaucoma        Current Outpatient Medications   Medication Sig Dispense Refill    neomycin-polymyxin-dexamethasone (DEXACINE) 3.5 mg/g-10,000 unit/g-0.1 % ophthalmic ointment       lisinopril-hydroCHLOROthiazide (PRINZIDE, ZESTORETIC) 20-25 mg per tablet TAKE 1 TABLET BY MOUTH  DAILY 90 Tablet 1    ezetimibe (ZETIA) 10 mg tablet Take 1 Tablet by mouth daily. 30 Tablet 11    amLODIPine (NORVASC) 10 mg tablet Take 1 Tablet by mouth daily. 90 Tablet 3    psyllium husk (DAILY FIBER PO) Take  by mouth.  omeprazole (PRILOSEC) 20 mg capsule Take 20 mg by mouth daily.  aspirin delayed-release 81 mg tablet Take 1 Tablet by mouth daily. 100 Tablet 1    cyanocobalamin (Vitamin B-12) 1,000 mcg tablet Take 1,000 mcg by mouth daily.  folic acid/multivit-min/lutein (CENTRUM SILVER PO) Take  by mouth.  bimatoprost (LUMIGAN) 0.03 % ophthalmic drops Administer 1 Drop to both eyes every evening.  DORZOLAMIDE HCL/TIMOLOL MALEAT (DORZOLAMIDE-TIMOLOL OP) Apply  to eye.  CALCIUM CARBONATE/VITAMIN D3 (CALCIUM WITH VITAMIN D PO) Take  by mouth.          Allergies   Allergen Reactions    Pravastatin Other (comments)     Arthralgias/myalgias       Past Medical History:   Diagnosis Date    Diverticulosis of sigmoid colon 11/13/15    mild; Dr. Julieta Gallagher Eczema     Elevated cholesterol     Family history of diabetes mellitus (DM)     Glaucoma     Hyperlipidemia     Hypertension     Indigestion     Interpolated PVCs        Past Surgical History:   Procedure Laterality Date    HX BREAST BIOPSY      HX HYSTERECTOMY  1981    STEPHAN/BSO    HX OOPHORECTOMY      right Family History   Problem Relation Age of Onset   [employer] Glaucoma Mother     Other Father         cerebral aneurysm    Hypertension Sister     Diabetes Sister     Hypertension Sister     Diabetes Paternal Uncle        Social History     Tobacco Use    Smoking status: Never Smoker    Smokeless tobacco: Never Used   Substance Use Topics    Alcohol use: No     Comment: with dinner a few days per week       ROS   Review of Systems   Constitutional: Negative for chills and fever. HENT: Negative for ear pain and sore throat. Eyes: Negative for blurred vision and pain. Respiratory: Positive for shortness of breath (JOHNSON). Negative for cough. Cardiovascular: Negative for chest pain. Gastrointestinal: Negative for abdominal pain, blood in stool and melena. Genitourinary: Negative for dysuria and hematuria. Musculoskeletal: Positive for joint pain. Negative for back pain, falls and myalgias. Skin: Negative for rash. Neurological: Negative for tingling, focal weakness and headaches. +Balance issues   Endo/Heme/Allergies: Does not bruise/bleed easily. Psychiatric/Behavioral: Negative for substance abuse. Objective     Vitals:    12/17/21 1125 12/17/21 1127   BP: (!) 144/62 (!) 148/68   Pulse: (!) 56 65   Resp: 14    Temp: 97.9 °F (36.6 °C)    TempSrc: Temporal    SpO2: 98%    Weight: 99 lb (44.9 kg)    Height: 5' 2\" (1.575 m)    PainSc:   0 - No pain        Physical Exam  Vitals and nursing note reviewed. HENT:      Head: Normocephalic and atraumatic. Right Ear: External ear normal.      Left Ear: External ear normal.   Eyes:      General: No scleral icterus. Right eye: No discharge. Left eye: No discharge. Conjunctiva/sclera: Conjunctivae normal.   Cardiovascular:      Rate and Rhythm: Normal rate and regular rhythm. Heart sounds: Normal heart sounds. No murmur heard. No friction rub. No gallop.     Pulmonary:      Effort: Pulmonary effort is normal. No respiratory distress. Breath sounds: No wheezing or rales. Comments: Scattered coarse breath sounds b/l - more pronounced today than at previous visits  Abdominal:      General: Bowel sounds are normal. There is no distension. Palpations: Abdomen is soft. There is no mass. Tenderness: There is no abdominal tenderness. There is no guarding or rebound. Musculoskeletal:         General: No swelling (BUE) or tenderness (BUE). Cervical back: Neck supple. Lymphadenopathy:      Cervical: No cervical adenopathy. Skin:     General: Skin is warm and dry. Findings: No erythema or rash. Neurological:      Mental Status: She is alert. Motor: No abnormal muscle tone.       Gait: Gait normal.   Psychiatric:         Mood and Affect: Mood normal.         LABS   Data Review:   Lab Results   Component Value Date/Time    WBC 6.6 02/17/2021 10:42 AM    WBC 6.8 05/16/2012 09:03 AM    HGB 12.4 02/17/2021 10:42 AM    HCT 35.4 02/17/2021 10:42 AM    PLATELET 038 92/45/1427 10:42 AM    MCV 89.4 02/17/2021 10:42 AM       Lab Results   Component Value Date/Time    Sodium 133 (L) 11/05/2021 09:09 AM    Potassium 4.0 11/05/2021 09:09 AM    Chloride 97 (L) 11/05/2021 09:09 AM    CO2 29 11/05/2021 09:09 AM    Anion gap 7 11/05/2021 09:09 AM    Glucose 128 (H) 11/05/2021 09:09 AM    BUN 16 11/05/2021 09:09 AM    Creatinine 0.70 11/05/2021 09:09 AM    BUN/Creatinine ratio 23 (H) 11/05/2021 09:09 AM    GFR est AA >60 11/05/2021 09:09 AM    GFR est non-AA >60 11/05/2021 09:09 AM    Calcium 9.3 11/05/2021 09:09 AM       Lab Results   Component Value Date/Time    Cholesterol, total 229 (H) 11/05/2021 09:09 AM    HDL Cholesterol 60 11/05/2021 09:09 AM    LDL, calculated 149.4 (H) 11/05/2021 09:09 AM    VLDL, calculated 19.6 11/05/2021 09:09 AM    Triglyceride 98 11/05/2021 09:09 AM    CHOL/HDL Ratio 3.8 11/05/2021 09:09 AM       Lab Results   Component Value Date/Time    Hemoglobin A1c 6.3 (H) 11/05/2021 09:09 AM Assessment/Plan:   1. Essential hypertension: +HLD. -We discussed adding an additional medication for better blood pressure control. She is very hesitant to have me adjust her blood pressure medication today. She states that she feels fine. I discussed how her blood pressure should be better controlled. I will reassess her pressure at her follow-up visit and if it is not at goal, I will once again recommend that she take additional blood pressure medication. For now, she will continue taking medication as prescribed.  -We will check labs  -I encouraged the patient to let me know whether or not she is actually taking her cholesterol medication. ORDERS:  - MICROALBUMIN, UR, RAND W/ MICROALB/CREAT RATIO; Future  - LIPID PANEL; Future  - METABOLIC PANEL, COMPREHENSIVE; Future  - HEMOGLOBIN A1C WITH EAG; Future    2. Hyperglycemia/Prediabetes: Worsened.  -I discussed how lentils are high in carbohydrates. -I encouraged her to reduce her carb intake in order to prevent progression to type 2 diabetes. -We will check labs just before her follow-up visit. ORDERS:  - METABOLIC PANEL, COMPREHENSIVE; Future  - HEMOGLOBIN A1C WITH EAG; Future    3. Abnormal Chest CT: +Pulmonary nodule. +Unintentional weight loss. Given her weight, frailty, PE findings, and history of chest CT findings, I would recommend she start treatment for atypical Mycobacterium infection. We will ultimately defer to her Pulmonology team. +JOHNSON. -I discussed how she needs to consume more protein in her diet in order to improve her weight. We discussed that she needs to gain weight not lose weight.  -I encouraged her to follow-up with her pulmonologist for evaluation. Per her PE findings today, I would recommend we get a follow-up chest CT and labs. Ordering these studies today. ORDERS:  - CT CHEST W CONT; Future  - CBC WITH AUTOMATED DIFF; Future  - C REACTIVE PROTEIN, QT; Future  - MARISSA COMPREHENSIVE PANEL;  Future  - C REACTIVE PROTEIN, QT; Future      Health Maintenance Due   Topic Date Due    Shingrix Vaccine Age 49> (1 of 2) Never done    COVID-19 Vaccine (3 - Booster) 08/23/2021         Lab review: labs are reviewed in the EHR and ordered as mentioned above. I have discussed the diagnosis with the patient and the intended plan as seen in the above orders. The patient has received an after-visit summary and questions were answered concerning future plans. I have discussed medication side effects and warnings with the patient as well. I have reviewed the plan of care with the patient, accepted their input and they are in agreement with the treatment goals. All questions were answered. The patient understands the plan of care. Handouts provided today with above information. Pt instructed if symptoms worsen to call the office or report to the ED for continued care. Greater than 50% of the visit time was spent in counseling and/or coordination of care. I spent 35 minutes with the pt for this in office visit, counseling her as described above. Voice recognition was used to generate this report, which may have resulted in some phonetic based errors in grammar and contents. Even though attempts were made to correct all the mistakes, some may have been missed, and remained in the body of the document.           Jana Ponce MD

## 2021-12-17 NOTE — PATIENT INSTRUCTIONS
Body Mass Index: Care Instructions  Your Care Instructions     Body mass index (BMI) can help you see if your weight is raising your risk for health problems. It uses a formula to compare how much you weigh with how tall you are. · A BMI lower than 18.5 is considered underweight. · A BMI between 18.5 and 24.9 is considered healthy. · A BMI between 25 and 29.9 is considered overweight. A BMI of 30 or higher is considered obese. If your BMI is in the normal range, it means that you have a lower risk for weight-related health problems. If your BMI is in the overweight or obese range, you may be at increased risk for weight-related health problems, such as high blood pressure, heart disease, stroke, arthritis or joint pain, and diabetes. If your BMI is in the underweight range, you may be at increased risk for health problems such as fatigue, lower protection (immunity) against illness, muscle loss, bone loss, hair loss, and hormone problems. BMI is just one measure of your risk for weight-related health problems. You may be at higher risk for health problems if you are not active, you eat an unhealthy diet, or you drink too much alcohol or use tobacco products. Follow-up care is a key part of your treatment and safety. Be sure to make and go to all appointments, and call your doctor if you are having problems. It's also a good idea to know your test results and keep a list of the medicines you take. How can you care for yourself at home? · Practice healthy eating habits. This includes eating plenty of fruits, vegetables, whole grains, lean protein, and low-fat dairy. · If your doctor recommends it, get more exercise. Walking is a good choice. Bit by bit, increase the amount you walk every day. Try for at least 30 minutes on most days of the week. · Do not smoke. Smoking can increase your risk for health problems. If you need help quitting, talk to your doctor about stop-smoking programs and medicines. These can increase your chances of quitting for good. · Limit alcohol to 2 drinks a day for men and 1 drink a day for women. Too much alcohol can cause health problems. If you have a BMI higher than 25  · Your doctor may do other tests to check your risk for weight-related health problems. This may include measuring the distance around your waist. A waist measurement of more than 40 inches in men or 35 inches in women can increase the risk of weight-related health problems. · Talk with your doctor about steps you can take to stay healthy or improve your health. You may need to make lifestyle changes to lose weight and stay healthy, such as changing your diet and getting regular exercise. If you have a BMI lower than 18.5  · Your doctor may do other tests to check your risk for health problems. · Talk with your doctor about steps you can take to stay healthy or improve your health. You may need to make lifestyle changes to gain or maintain weight and stay healthy, such as getting more healthy foods in your diet and doing exercises to build muscle. Where can you learn more? Go to http://www.akins.com/  Enter S176 in the search box to learn more about \"Body Mass Index: Care Instructions. \"  Current as of: March 17, 2021               Content Version: 13.0  © 9399-8410 [employer]. Care instructions adapted under license by [employer] (which disclaims liability or warranty for this information). If you have questions about a medical condition or this instruction, always ask your healthcare professional. Shruti Mock any warranty or liability for your use of this information.

## 2022-01-18 ENCOUNTER — HOSPITAL ENCOUNTER (OUTPATIENT)
Dept: CT IMAGING | Age: 82
Discharge: HOME | End: 2022-01-18
Attending: INTERNAL MEDICINE
Payer: MEDICARE

## 2022-01-18 LAB — CREAT UR-MCNC: 1.1 MG/DL (ref 0.6–1.3)

## 2022-01-18 PROCEDURE — 74011000636 HC RX REV CODE- 636: Performed by: INTERNAL MEDICINE

## 2022-01-18 PROCEDURE — 82565 ASSAY OF CREATININE: CPT

## 2022-01-18 PROCEDURE — 71260 CT THORAX DX C+: CPT

## 2022-01-18 RX ADMIN — IOPAMIDOL 80 ML: 612 INJECTION, SOLUTION INTRAVENOUS at 10:43

## 2022-01-20 NOTE — PROGRESS NOTES
Please schedule her for a follow-up visit in office or virtual, per her preference, to discuss her recent chest CT findings.  30 min apt needed    Dr. Leonardo President  [facility], [address] [place] [address], [address] [place] [address] Str.  Phone: [phone]  Fax: [phone]

## 2022-01-21 ENCOUNTER — TELEPHONE (OUTPATIENT)
Dept: INTERNAL MEDICINE CLINIC | Age: 82
End: 2022-01-21

## 2022-01-21 NOTE — TELEPHONE ENCOUNTER
----- Message from Devonte Zapata MD sent at 1/20/2022  3:04 PM EST -----  Please schedule her for a follow-up visit in office or virtual, per her preference, to discuss her recent chest CT findings.  30 min apt needed    Dr. Robles Handler  [facility] of [address], [address] [place] [address] Str.  Phone: [phone]  Fax: [phone]

## 2022-01-24 DIAGNOSIS — I10 PRIMARY HYPERTENSION: ICD-10-CM

## 2022-01-24 RX ORDER — LISINOPRIL AND HYDROCHLOROTHIAZIDE 20; 25 MG/1; MG/1
1 TABLET ORAL DAILY
Qty: 90 TABLET | Refills: 3 | Status: SHIPPED | OUTPATIENT
Start: 2022-01-24 | End: 2022-01-28 | Stop reason: SDUPTHER

## 2022-01-24 NOTE — TELEPHONE ENCOUNTER
----- Message from Betina Whitfield sent at 1/22/2022  9:43 AM EST -----  Subject: Refill Request    QUESTIONS  Name of Medication? lisinopril-hydroCHLOROthiazide (PRINZIDE, ZESTORETIC)   20-25 mg per tablet  Patient-reported dosage and instructions? one 20-25mg tablet per day  How many days do you have left? 5  Preferred Pharmacy? [facility] [id number]  Pharmacy phone number (if available)? [phone]  Additional Information for Provider? Patient would like to know if she can   have this prescription delivered by [facility]. She said that she previously   used their services but its been a while.   ---------------------------------------------------------------------------  --------------  [address]  What is the best way for the office to contact you? OK to leave message on   voicemail  Preferred Call Back Phone Number?  [phone]

## 2022-01-25 ENCOUNTER — VIRTUAL VISIT (OUTPATIENT)
Dept: INTERNAL MEDICINE CLINIC | Age: 82
End: 2022-01-25
Payer: MEDICARE

## 2022-01-25 ENCOUNTER — DOCUMENTATION ONLY (OUTPATIENT)
Dept: INTERNAL MEDICINE CLINIC | Age: 82
End: 2022-01-25

## 2022-01-25 DIAGNOSIS — K86.2 PANCREAS CYST: ICD-10-CM

## 2022-01-25 DIAGNOSIS — K86.2 PANCREAS CYST: Primary | ICD-10-CM

## 2022-01-25 PROBLEM — I77.810 AORTIC ROOT DILATATION (HCC): Status: RESOLVED | Noted: 2021-06-16 | Resolved: 2022-01-25

## 2022-01-25 PROCEDURE — 99442 PR PHYS/QHP TELEPHONE EVALUATION 11-20 MIN: CPT | Performed by: INTERNAL MEDICINE

## 2022-01-25 NOTE — PROGRESS NOTES
Jermaine Vera is a [de-identified] y.o. female who was seen by synchronous (real-time) audio-phone only technology on 1/25/2022. Assessment & Plan:   1. Pancreas cyst: Asymptomatic.  +Incidental finding.  -Ordering an MRI of her abdomen. ORDERS:  - MRI ABD W MRCP W WO CONT; Future    2. Atypical PNA: Per CT and bronchoscopy findings.  -We will fax her report of her CT scan to her pulmonologist for review and treatment recommendations. -Meanwhile, I encouraged her to schedule an appointment with her pulmonologist for evaluation. Treatment may recommended given her new chest CT findings, weakness, and weight loss. I will defer to her pulmonologist, who weigh the risk and benefits of such treatments. Lab review: labs are reviewed in the EHR     I have discussed the diagnosis with the patient and the intended plan as seen in the above orders. I have discussed medication side effects and warnings with the patient as well. I have reviewed the plan of care with the patient, accepted their input and they are in agreement with the treatment goals. All questions were answered. The patient understands the plan of care. Pt instructed if symptoms worsen to call the office or report to the ED for continued care. Greater than 50% of the visit time was spent in counseling and/or coordination of care. I spent 14 min with the pt for this phone only encounter. Voice recognition was used to generate this report, which may have resulted in some phonetic based errors in grammar and contents. Even though attempts were made to correct all the mistakes, some may have been missed, and remained in the body of the document. Subjective:   Jermaine Vera was seen for   Chief Complaint   Patient presents with    Abnormal CT Scan     The pt is a [de-identified] yo female with h/o prediabetes, HLD, eczema per EHR, OA, HTN (followed by Niecy Rudd with Cardiology), sciatica, positive sputum for AFB (2021), and glaucoma.     1. Atypical PNA: S/p bronchoscopy in the past. Sputum was positive for AFB.  + She has generalized weakness. Her weight has been declining as well. Her weight is 99 pounds when last assessed. No fever. No SOB. \"I don't really cough. \"  She had a recent chest CT. Findings are listed below. In the past, she saw a pulmonologist who did not recommend aggressive treatment given her age. She has not seen this doctor in the past 3 months. Wt Readings from Last 3 Encounters:   12/17/21 99 lb (44.9 kg)   06/30/21 104 lb (47.2 kg)   06/16/21 106 lb 9.6 oz (48.4 kg)     1/18/22 Chest CT: Nodular infiltrates with associated bronchial thickening and bronchiectatic changes. Slightly increased in overall extent compared to 04/20/21. Suspect underlying chronic indolent infectious process, such as WESLEY/ MAC infectious process. Increasing cystic focus at the pancreatic head. Recommend correlation with MR or multiphasic CT. 2. Pancreas Cyst: +Incidental finding on her recent CT study. No abdominal pain or diarrhea. No N/V. Prior to Admission medications    Medication Sig Start Date End Date Taking? Authorizing Provider   lisinopril-hydroCHLOROthiazide (PRINZIDE, ZESTORETIC) 20-25 mg per tablet Take 1 Tablet by mouth daily. 1/24/22   Lyla Coppola MD   neomycin-polymyxin-dexamethasone (DEXACINE) 3.5 mg/g-10,000 unit/g-0.1 % ophthalmic ointment  12/14/21   Provider, Historical   psyllium husk (DAILY FIBER PO) Take  by mouth. Provider, Historical   omeprazole (PRILOSEC) 20 mg capsule Take 20 mg by mouth daily. Provider, Historical   aspirin delayed-release 81 mg tablet Take 1 Tablet by mouth daily. 5/27/21   Pineda Obrien MD   cyanocobalamin (Vitamin B-12) 1,000 mcg tablet Take 1,000 mcg by mouth daily. Provider, Historical   folic acid/multivit-min/lutein (CENTRUM SILVER PO) Take  by mouth. Provider, Historical   bimatoprost (LUMIGAN) 0.03 % ophthalmic drops Administer 1 Drop to both eyes every evening.     Provider, Historical   DORZOLAMIDE HCL/TIMOLOL MALEAT (DORZOLAMIDE-TIMOLOL OP) Apply  to eye. Provider, Historical   CALCIUM CARBONATE/VITAMIN D3 (CALCIUM WITH VITAMIN D PO) Take  by mouth. Provider, Historical     Allergies   Allergen Reactions    Pravastatin Other (comments)     Arthralgias/myalgias     Past Medical History:   Diagnosis Date    Diverticulosis of sigmoid colon 11/13/15    mild; Dr. Cira Grimm Eczema     Elevated cholesterol     Family history of diabetes mellitus (DM)     Glaucoma     Hyperlipidemia     Hypertension     Indigestion     Interpolated PVCs      Past Surgical History:   Procedure Laterality Date    HX BREAST BIOPSY      HX HYSTERECTOMY  1981    STEPHAN/BSO    HX OOPHORECTOMY      right     Family History   Problem Relation Age of Onset   [address] [facility] [address] Glaucoma Mother     Other Father         cerebral aneurysm    Hypertension Sister     Diabetes Sister     Hypertension Sister     Diabetes Paternal Uncle      Social History     Socioeconomic History    Marital status:    Tobacco Use    Smoking status: Never Smoker    Smokeless tobacco: Never Used   Substance and Sexual Activity    Alcohol use: No     Comment: with dinner a few days per week    Drug use: No    Sexual activity: Not Currently       ROS: She states that she feels fine. No new sx  Gen: No fever/chills. +Fatigue. HEENT: No sore throat, eye pain, ear pain, or congestion.  No HA  CV: No CP  Resp: No cough/SOB  GI: No abdominal pain  : No hematuria/dysuria  Derm: No rash  Neuro: No new paresthesias/weakness  Musc: No new myalgias/jt pain  Psych: No depression sx    LABS:  Lab Results   Component Value Date/Time    Sodium 133 (L) 11/05/2021 09:09 AM    Potassium 4.0 11/05/2021 09:09 AM    Chloride 97 (L) 11/05/2021 09:09 AM    CO2 29 11/05/2021 09:09 AM    Anion gap 7 11/05/2021 09:09 AM    Glucose 128 (H) 11/05/2021 09:09 AM    BUN 16 11/05/2021 09:09 AM    Creatinine 0.70 11/05/2021 09:09 AM    BUN/Creatinine ratio 23 (H) 11/05/2021 09:09 AM    GFR est AA >60 11/05/2021 09:09 AM    GFR est non-AA >60 11/05/2021 09:09 AM    Calcium 9.3 11/05/2021 09:09 AM       Lab Results   Component Value Date/Time    Cholesterol, total 229 (H) 11/05/2021 09:09 AM    HDL Cholesterol 60 11/05/2021 09:09 AM    LDL, calculated 149.4 (H) 11/05/2021 09:09 AM    VLDL, calculated 19.6 11/05/2021 09:09 AM    Triglyceride 98 11/05/2021 09:09 AM    CHOL/HDL Ratio 3.8 11/05/2021 09:09 AM       Lab Results   Component Value Date/Time    WBC 6.6 02/17/2021 10:42 AM    WBC 6.8 05/16/2012 09:03 AM    HGB 12.4 02/17/2021 10:42 AM    HCT 35.4 02/17/2021 10:42 AM    PLATELET 323 71/29/6749 10:42 AM    MCV 89.4 02/17/2021 10:42 AM       Lab Results   Component Value Date/Time    Hemoglobin A1c 6.3 (H) 11/05/2021 09:09 AM       Lab Results   Component Value Date/Time    TSH 3.52 11/10/2017 08:08 AM           Due to this being a TeleHealth phone only evaluation, many elements of the physical examination are unable to be assessed. The pt was seen by synchronous (real-time) audio-phone only technology, and/or her healthcare decision maker, is aware that this patient-initiated, Telehealth encounter is a billable service, with coverage as determined by her insurance carrier. She is aware that she may receive a bill and has provided verbal consent to proceed: Yes. The pt is being evaluated by a phone only visit encounter for concerns as above. A caregiver was present when appropriate. Due to this being a TeleHealth encounter (During FTWGF-40 public health emergency), evaluation of the following organ systems was limited: Vitals/Constitutional/EENT/Resp/CV/GI//MS/Neuro/Skin/Heme-Lymph-Imm.    Pursuant to the emergency declaration under the [address] [place] [address], [address] [place] [address] authority and the [employer] Act, this Virtual phone only visit was conducted, with patient's (and/or legal guardian's) consent, to reduce the patient's risk of exposure to COVID-19 and provide necessary medical care. Services were provided through a phone only synchronous discussion virtually to substitute for in-person clinic visit. Patient and provider were located at their individual home/office respectively. We discussed the expected course, resolution and complications of the diagnosis(es) in detail. Medication risks, benefits, costs, interactions, and alternatives were discussed as indicated. I advised her to contact the office if her condition worsens, changes or fails to improve as anticipated. She expressed understanding with the diagnosis(es) and plan.      Óscar Crawford MD

## 2022-01-28 DIAGNOSIS — I10 PRIMARY HYPERTENSION: ICD-10-CM

## 2022-01-28 RX ORDER — LISINOPRIL AND HYDROCHLOROTHIAZIDE 20; 25 MG/1; MG/1
1 TABLET ORAL DAILY
Qty: 30 TABLET | Refills: 0 | Status: SHIPPED | OUTPATIENT
Start: 2022-01-28 | End: 2022-03-14

## 2022-01-28 RX ORDER — ASPIRIN 81 MG/1
81 TABLET ORAL DAILY
Qty: 100 TABLET | Refills: 3 | Status: SHIPPED | OUTPATIENT
Start: 2022-01-28

## 2022-01-28 NOTE — TELEPHONE ENCOUNTER
----- Message from Juan Daniel Mauro sent at 1/28/2022  8:49 AM EST -----  Subject: Message to Provider    QUESTIONS  Information for Provider? pt hasnt received any blood pressure meds and is   still waiting for them, please call her immediately   ---------------------------------------------------------------------------  --------------  [address]  What is the best way for the office to contact you? OK to leave message on   voicemail  Preferred Call Back Phone Number? [phone]  ---------------------------------------------------------------------------  --------------  SCRIPT ANSWERS  Relationship to Patient?  Self

## 2022-01-28 NOTE — TELEPHONE ENCOUNTER
Please call her and let her know that her refill was sent to [facility] per her request. She can pick it up today before the storm.     Dr. Bia Bermudez  [facility], [address] [place] [address], [address]  Phone: [phone]  Fax: [phone]

## 2022-01-28 NOTE — TELEPHONE ENCOUNTER
Pt is asking for a short supply of Lisinopril-Hydrochlorothiazide be sent to [facility] [address] because she hasn't received her mail order rx. She is in a panic about the snow so wants it sent as soon as possible.     Please call her when it is sent in

## 2022-02-10 ENCOUNTER — HOSPITAL ENCOUNTER (OUTPATIENT)
Age: 82
Discharge: HOME | End: 2022-02-10
Attending: INTERNAL MEDICINE
Payer: MEDICARE

## 2022-02-10 PROCEDURE — 74011250636 HC RX REV CODE- 250/636: Performed by: INTERNAL MEDICINE

## 2022-02-10 PROCEDURE — A9577 INJ MULTIHANCE: HCPCS | Performed by: INTERNAL MEDICINE

## 2022-02-10 PROCEDURE — 74183 MRI ABD W/O CNTR FLWD CNTR: CPT

## 2022-02-10 RX ADMIN — GADOBENATE DIMEGLUMINE 20 ML: 529 INJECTION, SOLUTION INTRAVENOUS at 12:40

## 2022-02-14 ENCOUNTER — TELEPHONE (OUTPATIENT)
Dept: INTERNAL MEDICINE CLINIC | Age: 82
End: 2022-02-14

## 2022-02-14 DIAGNOSIS — K86.9 PANCREATIC LESION: Primary | ICD-10-CM

## 2022-02-14 NOTE — TELEPHONE ENCOUNTER
Patient contacted, patient identified with two identifiers (Name & ). Patient aware of results per DR. Tang and verbalizes understanding. Referral generated and patient given contact info.

## 2022-02-14 NOTE — PROGRESS NOTES
Please let her know that I am referring her to a GI doctor for long-term surveillance of a pancreatic lesion. It is likely benign but has grown in size. She has an incidental small left renal cyst, scoliosis, and hiatal hernia, seen on her MRI of her abdomen. No additional studies are warranted for these findings but she should have additional studies later this year to monitor the lesion on the pancreas.     Dr. Catrachita Hendrickson  [facility], [address] [place] [address], [facility] [address]  Phone: [phone]  Fax: [phone]

## 2022-02-14 NOTE — TELEPHONE ENCOUNTER
----- Message from Rochelle Shankar MD sent at 2/14/2022  8:44 AM EST -----  Please let her know that I am referring her to a GI doctor for long-term surveillance of a pancreatic lesion. It is likely benign but has grown in size. She has an incidental small left renal cyst, scoliosis, and hiatal hernia, seen on her MRI of her abdomen. No additional studies are warranted for these findings but she should have additional studies later this year to monitor the lesion on the pancreas.     Dr. Aria Husain  [facility] [address]  Phone: [phone]  Fax: [phone]

## 2022-02-16 ENCOUNTER — OFFICE VISIT (OUTPATIENT)
Dept: CARDIOLOGY CLINIC | Age: 82
End: 2022-02-16
Payer: MEDICARE

## 2022-02-16 VITALS
DIASTOLIC BLOOD PRESSURE: 64 MMHG | TEMPERATURE: 127.4 F | HEART RATE: 56 BPM | WEIGHT: 100 LBS | BODY MASS INDEX: 18.4 KG/M2 | SYSTOLIC BLOOD PRESSURE: 155 MMHG | HEIGHT: 62 IN

## 2022-02-16 DIAGNOSIS — I77.810 AORTIC ROOT DILATATION (HCC): ICD-10-CM

## 2022-02-16 DIAGNOSIS — I35.1 NONRHEUMATIC AORTIC VALVE INSUFFICIENCY: Primary | ICD-10-CM

## 2022-02-16 DIAGNOSIS — I25.10 CORONARY ARTERY CALCIFICATION: ICD-10-CM

## 2022-02-16 DIAGNOSIS — I25.84 CORONARY ARTERY CALCIFICATION: ICD-10-CM

## 2022-02-16 DIAGNOSIS — E78.5 HYPERLIPIDEMIA, UNSPECIFIED HYPERLIPIDEMIA TYPE: ICD-10-CM

## 2022-02-16 DIAGNOSIS — I10 ESSENTIAL HYPERTENSION: ICD-10-CM

## 2022-02-16 PROCEDURE — G8753 SYS BP > OR = 140: HCPCS | Performed by: INTERNAL MEDICINE

## 2022-02-16 PROCEDURE — G8399 PT W/DXA RESULTS DOCUMENT: HCPCS | Performed by: INTERNAL MEDICINE

## 2022-02-16 PROCEDURE — 1090F PRES/ABSN URINE INCON ASSESS: CPT | Performed by: INTERNAL MEDICINE

## 2022-02-16 PROCEDURE — G8432 DEP SCR NOT DOC, RNG: HCPCS | Performed by: INTERNAL MEDICINE

## 2022-02-16 PROCEDURE — 99214 OFFICE O/P EST MOD 30 MIN: CPT | Performed by: INTERNAL MEDICINE

## 2022-02-16 PROCEDURE — G8419 CALC BMI OUT NRM PARAM NOF/U: HCPCS | Performed by: INTERNAL MEDICINE

## 2022-02-16 PROCEDURE — 1101F PT FALLS ASSESS-DOCD LE1/YR: CPT | Performed by: INTERNAL MEDICINE

## 2022-02-16 PROCEDURE — G8427 DOCREV CUR MEDS BY ELIG CLIN: HCPCS | Performed by: INTERNAL MEDICINE

## 2022-02-16 PROCEDURE — G8536 NO DOC ELDER MAL SCRN: HCPCS | Performed by: INTERNAL MEDICINE

## 2022-02-16 PROCEDURE — G8754 DIAS BP LESS 90: HCPCS | Performed by: INTERNAL MEDICINE

## 2022-02-16 RX ORDER — ROSUVASTATIN CALCIUM 5 MG/1
5 TABLET, COATED ORAL
Qty: 90 TABLET | Refills: 1 | Status: SHIPPED | OUTPATIENT
Start: 2022-02-16 | End: 2022-03-14

## 2022-02-16 RX ORDER — AZITHROMYCIN 250 MG/1
250 TABLET, FILM COATED ORAL DAILY
COMMUNITY

## 2022-02-16 NOTE — PROGRESS NOTES
HISTORY OF PRESENT ILLNESS  Luigi Bosworth is a [de-identified] y.o. female. 5/27/2021  Patient seen today for new patient evaluation. She is referred here for evaluation of shortness of breath and dyspnea. For about 1 year she has been having shortness of breath that is progressively getting worse. Recently had bronchoscopy that showed abnormality with fungal cells. She is on treatment under Dr. Ward Steinberg. She denies any chest pain. Denies orthopnea PND or peripheral edema. She has no prior cardiac history she has history of hypertension and hyperlipidemia on treatment. She has occasional dizziness likely related to antihypertensive medications  2/2022  Patient seen today for follow-up. Patient has shortness of breath on exertion. Symptoms are stable    Follow-up  Associated symptoms include shortness of breath. Pertinent negatives include no chest pain, no abdominal pain and no headaches. Review of Systems   Constitutional: Negative for chills and fever. HENT: Negative for nosebleeds. Eyes: Negative for blurred vision and double vision. Respiratory: Positive for shortness of breath. Negative for cough, hemoptysis, sputum production and wheezing. Cardiovascular: Negative for chest pain, palpitations, orthopnea, claudication, leg swelling and PND. Gastrointestinal: Negative for abdominal pain, heartburn, nausea and vomiting. Musculoskeletal: Negative for myalgias. Skin: Negative for rash. Neurological: Negative for dizziness, weakness and headaches. Endo/Heme/Allergies: Does not bruise/bleed easily.      Family History   Problem Relation Age of Onset   [facility] Mayo Glaucoma Mother     Other Father         cerebral aneurysm    Hypertension Sister     Diabetes Sister     Hypertension Sister     Diabetes Paternal Uncle        Past Medical History:   Diagnosis Date    Diverticulosis of sigmoid colon 11/13/15    mild; Dr. Jillian Desai Eczema     Elevated cholesterol     Family history of diabetes mellitus (DM)     Glaucoma     Hyperlipidemia     Hypertension     Indigestion     Interpolated PVCs        Past Surgical History:   Procedure Laterality Date    HX BREAST BIOPSY      HX HYSTERECTOMY  1981    STEPHAN/BSO    HX OOPHORECTOMY      right       Social History     Tobacco Use    Smoking status: Never Smoker    Smokeless tobacco: Never Used   Substance Use Topics    Alcohol use: No     Comment: with dinner a few days per week       Allergies   Allergen Reactions    Pravastatin Other (comments)     Arthralgias/myalgias       Prior to Admission medications    Medication Sig Start Date End Date Taking? Authorizing Provider   azithromycin (ZITHROMAX) 250 mg tablet Take 250 mg by mouth daily. Yes Provider, Historical   rosuvastatin (CRESTOR) 5 mg tablet Take 1 Tablet by mouth nightly. 2/16/22  Yes Armando Kennedy MD   lisinopril-hydroCHLOROthiazide (PRINZIDE, ZESTORETIC) 20-25 mg per tablet Take 1 Tablet by mouth daily. 1/28/22  Yes Eitan Virgen MD   aspirin delayed-release 81 mg tablet Take 1 Tablet by mouth daily. 1/28/22  Yes Andrei Carlos NP   neomycin-polymyxin-dexamethasone (DEXACINE) 3.5 mg/g-10,000 unit/g-0.1 % ophthalmic ointment  12/14/21  Yes Provider, Historical   psyllium husk (DAILY FIBER PO) Take  by mouth. Yes Provider, Historical   omeprazole (PRILOSEC) 20 mg capsule Take 20 mg by mouth daily. Yes Provider, Historical   cyanocobalamin (Vitamin B-12) 1,000 mcg tablet Take 1,000 mcg by mouth daily. Yes Provider, Historical   folic acid/multivit-min/lutein (CENTRUM SILVER PO) Take  by mouth. Yes Provider, Historical   bimatoprost (LUMIGAN) 0.03 % ophthalmic drops Administer 1 Drop to both eyes every evening. Yes Provider, Historical   DORZOLAMIDE HCL/TIMOLOL MALEAT (DORZOLAMIDE-TIMOLOL OP) Apply  to eye. Yes Provider, Historical   CALCIUM CARBONATE/VITAMIN D3 (CALCIUM WITH VITAMIN D PO) Take  by mouth.    Yes Provider, Historical         Visit Vitals  BP (!) 155/64 Pulse (!) 56   Temp (!) 127.4 °F (53 °C)   Ht 5' 2\" (1.575 m)   Wt 45.4 kg (100 lb)   BMI 18.29 kg/m²         Physical Exam  Constitutional:       Appearance: She is well-developed. HENT:      Head: Normocephalic and atraumatic. Eyes:      Conjunctiva/sclera: Conjunctivae normal.   Neck:      Thyroid: No thyromegaly. Vascular: No JVD. Trachea: No tracheal deviation. Cardiovascular:      Rate and Rhythm: Normal rate and regular rhythm. Chest Wall: PMI is not displaced. Pulses: No decreased pulses. Heart sounds: No murmur heard. No gallop. No S3 sounds. Pulmonary:      Effort: No respiratory distress. Breath sounds: No wheezing or rales. Chest:      Chest wall: No tenderness. Abdominal:      Palpations: Abdomen is soft. Tenderness: There is no abdominal tenderness. Musculoskeletal:      Cervical back: Neck supple. Skin:     General: Skin is warm. Neurological:      Mental Status: She is alert and oriented to person, place, and time. Ms. Angelika Prescott has a reminder for a \"due or due soon\" health maintenance. I have asked that she contact her primary care provider for follow-up on this health maintenance. No flowsheet data found. I have personally reviewed patient's records available from hospital and other providers and incorporated findings in patient care. Provider Notes, lab results, EKG, CT scan chest and CT any head and neck  CTA NECK VASCULAR ANALYSIS: 4/2021     Aortic arch: Mildly ectatic ascending aorta measuring 4 cm. Bronchoscpy-5/2021  Findings: The nasopharynx/oropharynx appears normal. The larynx appears normal except fro mild erythema dn edema. the arytenoids appeared erythematous and inflammed? reflux. The vocal cords appear normal. The subglottic space is normal. The trachea is of normal caliber. The royer is sharp. The tracheobronchial tree was examined to at least the first subsegmental level.  Bronchial mucosa and anatomy are normal; there are no endobronchial lesions, and minimal clear secretions. Bronchial washings taken from the RUL posterior segment and CHELSI with return of blood tinged pink fluid    Post-Operative Diagnosis:  - Infiltrate in the RUL with \"tree in bud pattern\"  - Possible reflux related erythema of the laryngeal structures  - The airway examination was normal  - Right Upper lobe and Left upper lobe bronchial washings collected       Recent Data from [address] to [address]  Component 05/19/21 05/19/21    KOH PREP No Yeast or hyphal elements seen. Budding Yeast cells seen. Abnormal      MODIFIED AFB SMEAR- for Nocardia  Specimen:  Various culture specimens - Bronchial structure (body structure)  Component 8 d ago   MODIFIED AFB SMEAR  No partially Acid Fast Bacillus seen      Interpretation Summary 6/2021 echo    · LV: Estimated LVEF is 55 - 60%. Normal cavity size, wall thickness and systolic function (ejection fraction normal). Wall motion: normal. Mild (grade 1) left ventricular diastolic dysfunction. · AV: Mild aortic valve regurgitation is present. · TV: Right Ventricular Arterial Pressure (RVSP) is 25 mmHg. · AO: Mild aortic root and ascending aorta dilatation. Aortic root diameter = 3.9 cm. Ascending aorta diameter = 4 cm. Procedure Conclusion 62,021    Nuclear Stress Test    Nuclear Cardiac Spect Rest then Gated Stress with tomographic imaging/tomography utilized for the tomographic myocardical perfusion imaging performed. Exercise was used as the stressing method and agent. One day myocardial perfusion study. Date: 6/4/2021. Left ventricular perfusion is normal. Myocardial perfusion imaging supports a low risk stress test.   There is no prior study available for comparison. . This Single Photon Emission Computer Tomograph (SPECT) study utilized tomographic imaging/tomography for the tomographic myocardial perfusion imaging performed during this study.        Assessment ICD-10-CM ICD-9-CM    1. Nonrheumatic aortic valve insufficiency  I35.1 424.1     Stable symptom continue treatment monitor   2. Aortic root dilatation (HCC)  I77.810 447.71     Asymptomatic stable monitor   3. Essential hypertension  I10 401.9     Stable continue treatment   4. Hyperlipidemia, unspecified hyperlipidemia type  E78.5 272.4 HEPATIC FUNCTION PANEL      LIPID PANEL    Weighted LDL. We will add low-dose rosuvastatin   5. Coronary artery calcification  I25.10 414.00     I25.84 414.4     Coronary calcification noted on recent CT scan on my review. Would add statin his LDL is very high and continue with baby aspirin   6/2021  Seen for follow-up. Shortness of breath on exertion is unchanged. Mild aortic regurgitation and aortic root dilatation. Blood pressure is elevated I will increase amlodipine to 10 mg a day. Continue other treatment  2/2022  Cardiac status stable continue current medical management. Coronary calcification noted on my review of CT scan. Would add low-dose rosuvastatin to see if she can tolerate. We will also continue with aspirin 81 mg a day. Blood pressure elevated here but home readings are normal she will take the blood pressure machine with her  To her PCP  There are no discontinued medications. Orders Placed This Encounter    HEPATIC FUNCTION PANEL     Standing Status:   Future     Standing Expiration Date:   8/17/2022    LIPID PANEL     Standing Status:   Future     Standing Expiration Date:   8/17/2022    rosuvastatin (CRESTOR) 5 mg tablet     Sig: Take 1 Tablet by mouth nightly. Dispense:  90 Tablet     Refill:  1       Follow-up and Dispositions    · Return in about 6 months (around 8/16/2022).

## 2022-02-16 NOTE — PROGRESS NOTES
1. Have you been to the ER, urgent care clinic since your last visit? Hospitalized since your last visit? Yes [facility]  2. Have you seen or consulted any other health care providers outside of the [address] [place] [address] since your last visit? Include any pap smears or colon screening.       Yes Where: Dr. Silviano Bolivar

## 2022-02-18 ENCOUNTER — TELEPHONE (OUTPATIENT)
Dept: CARDIOLOGY CLINIC | Age: 82
End: 2022-02-18

## 2022-02-18 NOTE — TELEPHONE ENCOUNTER
Per Dawood to hold crestor and take tylenol and forword to Dr. Dallas Colby  This has been fully explained to the patient, who indicates understanding.

## 2022-02-18 NOTE — TELEPHONE ENCOUNTER
Pt c/o ankle swelling after taking rosuvastatin on Wednesday night and had pain when walking yesterday. Patient did not take medication yesterday for concern that it would happen again. States symptoms have improved today swelling is still present but has gone down, has not taken medication yet today. Please advise.

## 2022-03-14 ENCOUNTER — TELEPHONE (OUTPATIENT)
Dept: CARDIOLOGY CLINIC | Age: 82
End: 2022-03-14

## 2022-03-14 ENCOUNTER — HOSPITAL ENCOUNTER (INPATIENT)
Age: 82
LOS: 3 days | Discharge: HOME | DRG: 312 | End: 2022-03-17
Attending: EMERGENCY MEDICINE | Admitting: STUDENT IN AN ORGANIZED HEALTH CARE EDUCATION/TRAINING PROGRAM
Payer: MEDICARE

## 2022-03-14 ENCOUNTER — APPOINTMENT (OUTPATIENT)
Dept: CT IMAGING | Age: 82
DRG: 312 | End: 2022-03-14
Attending: EMERGENCY MEDICINE
Payer: MEDICARE

## 2022-03-14 ENCOUNTER — APPOINTMENT (OUTPATIENT)
Dept: GENERAL RADIOLOGY | Age: 82
DRG: 312 | End: 2022-03-14
Attending: EMERGENCY MEDICINE
Payer: MEDICARE

## 2022-03-14 DIAGNOSIS — E86.0 DEHYDRATION: ICD-10-CM

## 2022-03-14 DIAGNOSIS — E87.6 HYPOKALEMIA: ICD-10-CM

## 2022-03-14 DIAGNOSIS — R55 SYNCOPE AND COLLAPSE: Primary | ICD-10-CM

## 2022-03-14 DIAGNOSIS — S09.90XA CLOSED HEAD INJURY, INITIAL ENCOUNTER: ICD-10-CM

## 2022-03-14 LAB
ALBUMIN SERPL-MCNC: 3.2 G/DL (ref 3.4–5)
ALBUMIN/GLOB SERPL: 0.9 {RATIO} (ref 0.8–1.7)
ALP SERPL-CCNC: 61 U/L (ref 45–117)
ALT SERPL-CCNC: 36 U/L (ref 13–56)
ANION GAP SERPL CALC-SCNC: 6 MMOL/L (ref 3–18)
APPEARANCE UR: CLEAR
APTT PPP: 28.8 SEC (ref 23–36.4)
AST SERPL-CCNC: 29 U/L (ref 10–38)
ATRIAL RATE: 288 BPM
BACTERIA URNS QL MICRO: NEGATIVE /HPF
BASOPHILS # BLD: 0 K/UL (ref 0–0.1)
BASOPHILS NFR BLD: 0 % (ref 0–2)
BILIRUB SERPL-MCNC: 0.2 MG/DL (ref 0.2–1)
BILIRUB UR QL: NEGATIVE
BUN SERPL-MCNC: 16 MG/DL (ref 7–18)
BUN/CREAT SERPL: 21 (ref 12–20)
CALCIUM SERPL-MCNC: 8.3 MG/DL (ref 8.5–10.1)
CALCULATED R AXIS, ECG10: -3 DEGREES
CALCULATED T AXIS, ECG11: 66 DEGREES
CHLORIDE SERPL-SCNC: 95 MMOL/L (ref 100–111)
CO2 SERPL-SCNC: 33 MMOL/L (ref 21–32)
COLOR UR: YELLOW
CREAT SERPL-MCNC: 0.78 MG/DL (ref 0.6–1.3)
DIAGNOSIS, 93000: NORMAL
DIFFERENTIAL METHOD BLD: ABNORMAL
EOSINOPHIL # BLD: 0 K/UL (ref 0–0.4)
EOSINOPHIL NFR BLD: 1 % (ref 0–5)
EPITH CASTS URNS QL MICRO: NORMAL /LPF (ref 0–5)
ERYTHROCYTE [DISTWIDTH] IN BLOOD BY AUTOMATED COUNT: 12.5 % (ref 11.6–14.5)
GLOBULIN SER CALC-MCNC: 3.6 G/DL (ref 2–4)
GLUCOSE SERPL-MCNC: 160 MG/DL (ref 74–99)
GLUCOSE UR STRIP.AUTO-MCNC: 250 MG/DL
HCT VFR BLD AUTO: 35.6 % (ref 35–45)
HGB BLD-MCNC: 12.5 G/DL (ref 12–16)
HGB UR QL STRIP: NEGATIVE
IMM GRANULOCYTES # BLD AUTO: 0 K/UL (ref 0–0.04)
IMM GRANULOCYTES NFR BLD AUTO: 1 % (ref 0–0.5)
INR PPP: 1 (ref 0.8–1.2)
KETONES UR QL STRIP.AUTO: NEGATIVE MG/DL
LEUKOCYTE ESTERASE UR QL STRIP.AUTO: NEGATIVE
LYMPHOCYTES # BLD: 0.9 K/UL (ref 0.9–3.6)
LYMPHOCYTES NFR BLD: 17 % (ref 21–52)
MAGNESIUM SERPL-MCNC: 1.9 MG/DL (ref 1.6–2.6)
MCH RBC QN AUTO: 30.9 PG (ref 24–34)
MCHC RBC AUTO-ENTMCNC: 35.1 G/DL (ref 31–37)
MCV RBC AUTO: 87.9 FL (ref 78–100)
MONOCYTES # BLD: 0.5 K/UL (ref 0.05–1.2)
MONOCYTES NFR BLD: 8 % (ref 3–10)
NEUTS SEG # BLD: 3.9 K/UL (ref 1.8–8)
NEUTS SEG NFR BLD: 73 % (ref 40–73)
NITRITE UR QL STRIP.AUTO: NEGATIVE
NRBC # BLD: 0 K/UL (ref 0–0.01)
NRBC BLD-RTO: 0 PER 100 WBC
PH UR STRIP: 7.5 [PH] (ref 5–8)
PLATELET # BLD AUTO: 157 K/UL (ref 135–420)
PMV BLD AUTO: 11.5 FL (ref 9.2–11.8)
POTASSIUM SERPL-SCNC: 3.4 MMOL/L (ref 3.5–5.5)
PROT SERPL-MCNC: 6.8 G/DL (ref 6.4–8.2)
PROT UR STRIP-MCNC: 30 MG/DL
PROTHROMBIN TIME: 12.8 SEC (ref 11.5–15.2)
Q-T INTERVAL, ECG07: 424 MS
QRS DURATION, ECG06: 82 MS
QTC CALCULATION (BEZET), ECG08: 390 MS
RBC # BLD AUTO: 4.05 M/UL (ref 4.2–5.3)
RBC #/AREA URNS HPF: NORMAL /HPF (ref 0–5)
SODIUM SERPL-SCNC: 134 MMOL/L (ref 136–145)
SP GR UR REFRACTOMETRY: 1.01 (ref 1–1.03)
TROPONIN-HIGH SENSITIVITY: 18 NG/L (ref 0–54)
TSH SERPL DL<=0.05 MIU/L-ACNC: 3.26 UIU/ML (ref 0.36–3.74)
UROBILINOGEN UR QL STRIP.AUTO: 0.2 EU/DL (ref 0.2–1)
VENTRICULAR RATE, ECG03: 51 BPM
WBC # BLD AUTO: 5.4 K/UL (ref 4.6–13.2)
WBC URNS QL MICRO: NORMAL /HPF (ref 0–4)

## 2022-03-14 PROCEDURE — 72125 CT NECK SPINE W/O DYE: CPT

## 2022-03-14 PROCEDURE — 96360 HYDRATION IV INFUSION INIT: CPT

## 2022-03-14 PROCEDURE — 93005 ELECTROCARDIOGRAM TRACING: CPT

## 2022-03-14 PROCEDURE — 99285 EMERGENCY DEPT VISIT HI MDM: CPT

## 2022-03-14 PROCEDURE — 74011000250 HC RX REV CODE- 250: Performed by: EMERGENCY MEDICINE

## 2022-03-14 PROCEDURE — 74011250636 HC RX REV CODE- 250/636: Performed by: EMERGENCY MEDICINE

## 2022-03-14 PROCEDURE — 84443 ASSAY THYROID STIM HORMONE: CPT

## 2022-03-14 PROCEDURE — 65660000000 HC RM CCU STEPDOWN

## 2022-03-14 PROCEDURE — 83735 ASSAY OF MAGNESIUM: CPT

## 2022-03-14 PROCEDURE — 99221 1ST HOSP IP/OBS SF/LOW 40: CPT | Performed by: INTERNAL MEDICINE

## 2022-03-14 PROCEDURE — 85025 COMPLETE CBC W/AUTO DIFF WBC: CPT

## 2022-03-14 PROCEDURE — 80053 COMPREHEN METABOLIC PANEL: CPT

## 2022-03-14 PROCEDURE — 75810000293 HC SIMP/SUPERF WND  RPR

## 2022-03-14 PROCEDURE — 71045 X-RAY EXAM CHEST 1 VIEW: CPT

## 2022-03-14 PROCEDURE — 74011000250 HC RX REV CODE- 250: Performed by: NURSE PRACTITIONER

## 2022-03-14 PROCEDURE — APPSS60 APP SPLIT SHARED TIME 46-60 MINUTES: Performed by: NURSE PRACTITIONER

## 2022-03-14 PROCEDURE — 84484 ASSAY OF TROPONIN QUANT: CPT

## 2022-03-14 PROCEDURE — 81001 URINALYSIS AUTO W/SCOPE: CPT

## 2022-03-14 PROCEDURE — 74011250637 HC RX REV CODE- 250/637: Performed by: NURSE PRACTITIONER

## 2022-03-14 PROCEDURE — 85730 THROMBOPLASTIN TIME PARTIAL: CPT

## 2022-03-14 PROCEDURE — 70450 CT HEAD/BRAIN W/O DYE: CPT

## 2022-03-14 PROCEDURE — 85610 PROTHROMBIN TIME: CPT

## 2022-03-14 PROCEDURE — 90471 IMMUNIZATION ADMIN: CPT

## 2022-03-14 PROCEDURE — 0HQ0XZZ REPAIR SCALP SKIN, EXTERNAL APPROACH: ICD-10-PCS | Performed by: EMERGENCY MEDICINE

## 2022-03-14 PROCEDURE — 74011250637 HC RX REV CODE- 250/637: Performed by: EMERGENCY MEDICINE

## 2022-03-14 PROCEDURE — 90715 TDAP VACCINE 7 YRS/> IM: CPT | Performed by: EMERGENCY MEDICINE

## 2022-03-14 RX ORDER — POTASSIUM CHLORIDE 1500 MG/1
40 TABLET, FILM COATED, EXTENDED RELEASE ORAL DAILY
Qty: 10 TABLET | Refills: 0 | Status: SHIPPED | OUTPATIENT
Start: 2022-03-14 | End: 2022-03-30

## 2022-03-14 RX ORDER — ACETAMINOPHEN 650 MG/1
650 SUPPOSITORY RECTAL
Status: DISCONTINUED | OUTPATIENT
Start: 2022-03-14 | End: 2022-03-17 | Stop reason: HOSPADM

## 2022-03-14 RX ORDER — ENOXAPARIN SODIUM 100 MG/ML
40 INJECTION SUBCUTANEOUS DAILY
Status: DISCONTINUED | OUTPATIENT
Start: 2022-03-15 | End: 2022-03-17 | Stop reason: HOSPADM

## 2022-03-14 RX ORDER — POLYETHYLENE GLYCOL 3350 17 G/17G
17 POWDER, FOR SOLUTION ORAL DAILY PRN
Status: DISCONTINUED | OUTPATIENT
Start: 2022-03-14 | End: 2022-03-17 | Stop reason: HOSPADM

## 2022-03-14 RX ORDER — ONDANSETRON 2 MG/ML
4 INJECTION INTRAMUSCULAR; INTRAVENOUS
Status: DISCONTINUED | OUTPATIENT
Start: 2022-03-14 | End: 2022-03-17 | Stop reason: HOSPADM

## 2022-03-14 RX ORDER — RIFAMPIN 150 MG/1
150 CAPSULE ORAL
Status: DISCONTINUED | OUTPATIENT
Start: 2022-03-15 | End: 2022-03-17 | Stop reason: HOSPADM

## 2022-03-14 RX ORDER — ACETAMINOPHEN 325 MG/1
650 TABLET ORAL
Status: DISCONTINUED | OUTPATIENT
Start: 2022-03-14 | End: 2022-03-17 | Stop reason: HOSPADM

## 2022-03-14 RX ORDER — ONDANSETRON 4 MG/1
4 TABLET, ORALLY DISINTEGRATING ORAL
Status: DISCONTINUED | OUTPATIENT
Start: 2022-03-14 | End: 2022-03-17 | Stop reason: HOSPADM

## 2022-03-14 RX ORDER — SODIUM CHLORIDE 0.9 % (FLUSH) 0.9 %
5-40 SYRINGE (ML) INJECTION AS NEEDED
Status: DISCONTINUED | OUTPATIENT
Start: 2022-03-14 | End: 2022-03-17 | Stop reason: HOSPADM

## 2022-03-14 RX ORDER — FAMOTIDINE 20 MG/1
20 TABLET, FILM COATED ORAL 2 TIMES DAILY
Status: DISCONTINUED | OUTPATIENT
Start: 2022-03-14 | End: 2022-03-15

## 2022-03-14 RX ORDER — LEVETIRACETAM 250 MG/1
250 TABLET ORAL 2 TIMES DAILY
Qty: 60 TABLET | Refills: 0 | Status: SHIPPED
Start: 2022-03-14 | End: 2022-03-22

## 2022-03-14 RX ORDER — LIDOCAINE HYDROCHLORIDE AND EPINEPHRINE 10; 10 MG/ML; UG/ML
10 INJECTION, SOLUTION INFILTRATION; PERINEURAL ONCE
Status: COMPLETED | OUTPATIENT
Start: 2022-03-14 | End: 2022-03-14

## 2022-03-14 RX ORDER — RIFABUTIN 150 MG/1
CAPSULE ORAL
COMMUNITY

## 2022-03-14 RX ORDER — POTASSIUM CHLORIDE 20 MEQ/1
40 TABLET, EXTENDED RELEASE ORAL
Status: COMPLETED | OUTPATIENT
Start: 2022-03-14 | End: 2022-03-14

## 2022-03-14 RX ORDER — SODIUM CHLORIDE 0.9 % (FLUSH) 0.9 %
5-40 SYRINGE (ML) INJECTION EVERY 8 HOURS
Status: DISCONTINUED | OUTPATIENT
Start: 2022-03-14 | End: 2022-03-17 | Stop reason: HOSPADM

## 2022-03-14 RX ORDER — AZITHROMYCIN 250 MG/1
250 TABLET, FILM COATED ORAL DAILY
Status: DISCONTINUED | OUTPATIENT
Start: 2022-03-15 | End: 2022-03-17 | Stop reason: HOSPADM

## 2022-03-14 RX ADMIN — LIDOCAINE HYDROCHLORIDE AND EPINEPHRINE 100 MG: 10; 10 INJECTION, SOLUTION INFILTRATION; PERINEURAL at 12:40

## 2022-03-14 RX ADMIN — SODIUM CHLORIDE, PRESERVATIVE FREE 10 ML: 5 INJECTION INTRAVENOUS at 22:04

## 2022-03-14 RX ADMIN — POTASSIUM CHLORIDE 40 MEQ: 1500 TABLET, EXTENDED RELEASE ORAL at 13:03

## 2022-03-14 RX ADMIN — FAMOTIDINE 20 MG: 20 TABLET, FILM COATED ORAL at 22:18

## 2022-03-14 RX ADMIN — SODIUM CHLORIDE 500 ML: 900 INJECTION, SOLUTION INTRAVENOUS at 12:55

## 2022-03-14 RX ADMIN — TETANUS TOXOID, REDUCED DIPHTHERIA TOXOID AND ACELLULAR PERTUSSIS VACCINE, ADSORBED 0.5 ML: 5; 2.5; 8; 8; 2.5 SUSPENSION INTRAMUSCULAR at 12:58

## 2022-03-14 NOTE — ED PROVIDER NOTES
EMERGENCY DEPARTMENT HISTORY AND PHYSICAL EXAM    11:45 AM      Date: 3/14/2022  Patient Name: Raysa Stack    History of Presenting Illness     Chief Complaint   Patient presents with    Fall    Head Injury    Head Laceration         History Provided By: Patient and Patient's Daughter  Location/Duration/Severity/Modifying factors   Patient is 71-year-old female with a history of hypertension, WESLEY pulmonary infection, hypertension, hyperlipidemia, valvular heart disease, diverticulosis, the presents emergency department with complaint of head injury. The patient has been losing weight over the last 2 months according to the patient's daughter who provides majority of the history. Patient in February had an episode where she was working in [employer] and said she felt tired and then started to shake and while her eyes were open and had brief episode of altered mental status that resolved within minutes. Patient went to urgent care had a reassuring evaluation and a decrease in her blood pressure medications and has been doing well as far as her daughter was aware. Patient's daughter comes into town every few weeks and has been in town for the last several days and then today while patient was in the kitchen she heard a fall and went and found the patient lying on her side shaking, and was unresponsive. The episode lasted for less than a minute and the patient stopped and had some mild confusion for several minutes and then came back to her baseline. The patient had no incontinence, tongue biting, or history of seizure. Patient has a pancreatic finding on MRI that is being followed by GI and is being treated with azithromycin and Mycobutin daily since February. The patient otherwise lives on her own and patient's daughter lives no emergent need. Patient at this time has no complaints and denies any chest pain, or new shortness of breath.   The patient has been taking less of her lisinopril hydrochlorothiazide but otherwise is taking her medications as normal.  The patient's son is a emergency physician in [place] and has been following along with this issue. The patient is not a smoker, not a drinker, nor drug user. The patient has been having some mild memory loss over the last several months according the patient's daughter however has been functional at home otherwise. PCP: Dung Sam MD    Current Outpatient Medications   Medication Sig Dispense Refill    levETIRAcetam (Keppra) 250 mg tablet Take 1 Tablet by mouth two (2) times a day. 60 Tablet 0    potassium chloride SR (K-TAB) 20 mEq tablet Take 2 Tablets by mouth daily. 10 Tablet 0    rifabutin (MYCOBUTIN) 150 mg capsule rifabutin 150 mg capsule   Take 1 capsule every day by oral route.  azithromycin (ZITHROMAX) 250 mg tablet Take 250 mg by mouth daily.  aspirin delayed-release 81 mg tablet Take 1 Tablet by mouth daily. (Patient taking differently: Take 40.5 mg by mouth daily. ) 100 Tablet 3    neomycin-polymyxin-dexamethasone (DEXACINE) 3.5 mg/g-10,000 unit/g-0.1 % ophthalmic ointment       psyllium husk (DAILY FIBER PO) Take  by mouth.  omeprazole (PRILOSEC) 20 mg capsule Take 20 mg by mouth daily.  cyanocobalamin (Vitamin B-12) 1,000 mcg tablet Take 1,000 mcg by mouth daily.  folic acid/multivit-min/lutein (CENTRUM SILVER PO) Take  by mouth.  bimatoprost (LUMIGAN) 0.03 % ophthalmic drops Administer 1 Drop to both eyes every evening.  DORZOLAMIDE HCL/TIMOLOL MALEAT (DORZOLAMIDE-TIMOLOL OP) Apply  to eye.  CALCIUM CARBONATE/VITAMIN D3 (CALCIUM WITH VITAMIN D PO) Take  by mouth.          Past History     Past Medical History:  Past Medical History:   Diagnosis Date    Diverticulosis of sigmoid colon 11/13/15    mild; Dr. Elliot Velazquez Eczema     Elevated cholesterol     Family history of diabetes mellitus (DM)     Glaucoma     Hyperlipidemia     Hypertension     Indigestion  Interpolated PVCs        Past Surgical History:  Past Surgical History:   Procedure Laterality Date    HX BREAST BIOPSY      HX HYSTERECTOMY  1981    STEPHAN/BSO    HX OOPHORECTOMY      right       Family History:  Family History   Problem Relation Age of Onset   Allison Lobe Glaucoma Mother     Other Father         cerebral aneurysm    Hypertension Sister     Diabetes Sister     Hypertension Sister     Diabetes Paternal Uncle        Social History:  Social History     Tobacco Use    Smoking status: Never Smoker    Smokeless tobacco: Never Used   Substance Use Topics    Alcohol use: No     Comment: with dinner a few days per week    Drug use: No       Allergies: Allergies   Allergen Reactions    Pravastatin Other (comments)     Arthralgias/myalgias         Review of Systems       Review of Systems   Constitutional: Positive for activity change, appetite change, fatigue and unexpected weight change. Negative for fever. HENT: Negative for congestion and rhinorrhea. Eyes: Negative for visual disturbance. Respiratory: Negative for shortness of breath. Cardiovascular: Negative for chest pain and palpitations. Gastrointestinal: Negative for abdominal pain, diarrhea, nausea and vomiting. Genitourinary: Negative for dysuria and hematuria. Musculoskeletal: Negative for back pain. Skin: Negative for rash. Neurological: Positive for syncope and light-headedness. Negative for dizziness and weakness. All other systems reviewed and are negative. Physical Exam     Visit Vitals  BP (!) 149/76 (BP Patient Position: Standing)   Pulse 61   Temp 97.6 °F (36.4 °C)   Resp 16   Ht 5' (1.524 m)   Wt 44.9 kg (99 lb)   SpO2 100%   BMI 19.33 kg/m²         Physical Exam  Vitals and nursing note reviewed. Constitutional:       General: She is not in acute distress. Appearance: She is well-developed. HENT:      Head: Normocephalic.       Comments: Right occipital hematoma with approximately 4 cm laceration noted without active bleeding     Right Ear: External ear normal.      Left Ear: External ear normal.      Nose: Nose normal.   Eyes:      General: No scleral icterus. Conjunctiva/sclera: Conjunctivae normal.      Pupils: Pupils are equal, round, and reactive to light. Neck:      Thyroid: No thyromegaly. Vascular: No JVD. Trachea: No tracheal deviation. Cardiovascular:      Rate and Rhythm: Regular rhythm. Bradycardia present. Heart sounds: Normal heart sounds. No murmur heard. No friction rub. No gallop. Pulmonary:      Effort: Pulmonary effort is normal.      Breath sounds: Normal breath sounds. Chest:      Chest wall: No tenderness. Abdominal:      General: Bowel sounds are normal. There is no distension. Palpations: Abdomen is soft. Tenderness: There is no abdominal tenderness. There is no guarding or rebound. Musculoskeletal:         General: No tenderness. Normal range of motion. Cervical back: Normal range of motion and neck supple. Comments: No deformity, full range of motion of her hips, left forearm with small skin tear     Lymphadenopathy:      Cervical: No cervical adenopathy. Skin:     General: Skin is warm and dry. Neurological:      Mental Status: She is alert and oriented to person, place, and time. Cranial Nerves: No cranial nerve deficit. Coordination: Coordination normal.      Comments: Globally weak, follows commands, gait not observed   Psychiatric:         Behavior: Behavior normal.         Thought Content:  Thought content normal.         Judgment: Judgment normal.      Comments: Supportive and insightful family at the bedside           Diagnostic Study Results     Labs -  Recent Results (from the past 12 hour(s))   EKG, 12 LEAD, INITIAL    Collection Time: 03/14/22 10:20 AM   Result Value Ref Range    Ventricular Rate 51 BPM    Atrial Rate 288 BPM    QRS Duration 82 ms    Q-T Interval 424 ms    QTC Calculation (Bezet) 390 ms Calculated R Axis -3 degrees    Calculated T Axis 66 degrees    Diagnosis       Junctional rhythm  Cannot rule out Anterior infarct , age undetermined  Abnormal ECG  When compared with ECG of 19-FEB-2021 16:56,  Junctional rhythm has replaced Sinus rhythm     CBC WITH AUTOMATED DIFF    Collection Time: 03/14/22 11:20 AM   Result Value Ref Range    WBC 5.4 4.6 - 13.2 K/uL    RBC 4.05 (L) 4.20 - 5.30 M/uL    HGB 12.5 12.0 - 16.0 g/dL    HCT 35.6 35.0 - 45.0 %    MCV 87.9 78.0 - 100.0 FL    MCH 30.9 24.0 - 34.0 PG    MCHC 35.1 31.0 - 37.0 g/dL    RDW 12.5 11.6 - 14.5 %    PLATELET 716 188 - 990 K/uL    MPV 11.5 9.2 - 11.8 FL    NRBC 0.0 0  WBC    ABSOLUTE NRBC 0.00 0.00 - 0.01 K/uL    NEUTROPHILS 73 40 - 73 %    LYMPHOCYTES 17 (L) 21 - 52 %    MONOCYTES 8 3 - 10 %    EOSINOPHILS 1 0 - 5 %    BASOPHILS 0 0 - 2 %    IMMATURE GRANULOCYTES 1 (H) 0.0 - 0.5 %    ABS. NEUTROPHILS 3.9 1.8 - 8.0 K/UL    ABS. LYMPHOCYTES 0.9 0.9 - 3.6 K/UL    ABS. MONOCYTES 0.5 0.05 - 1.2 K/UL    ABS. EOSINOPHILS 0.0 0.0 - 0.4 K/UL    ABS. BASOPHILS 0.0 0.0 - 0.1 K/UL    ABS. IMM. GRANS. 0.0 0.00 - 0.04 K/UL    DF AUTOMATED     METABOLIC PANEL, COMPREHENSIVE    Collection Time: 03/14/22 11:20 AM   Result Value Ref Range    Sodium 134 (L) 136 - 145 mmol/L    Potassium 3.4 (L) 3.5 - 5.5 mmol/L    Chloride 95 (L) 100 - 111 mmol/L    CO2 33 (H) 21 - 32 mmol/L    Anion gap 6 3.0 - 18 mmol/L    Glucose 160 (H) 74 - 99 mg/dL    BUN 16 7.0 - 18 MG/DL    Creatinine 0.78 0.6 - 1.3 MG/DL    BUN/Creatinine ratio 21 (H) 12 - 20      GFR est AA >60 >60 ml/min/1.73m2    GFR est non-AA >60 >60 ml/min/1.73m2    Calcium 8.3 (L) 8.5 - 10.1 MG/DL    Bilirubin, total 0.2 0.2 - 1.0 MG/DL    ALT (SGPT) 36 13 - 56 U/L    AST (SGOT) 29 10 - 38 U/L    Alk.  phosphatase 61 45 - 117 U/L    Protein, total 6.8 6.4 - 8.2 g/dL    Albumin 3.2 (L) 3.4 - 5.0 g/dL    Globulin 3.6 2.0 - 4.0 g/dL    A-G Ratio 0.9 0.8 - 1.7     MAGNESIUM    Collection Time: 03/14/22 11:20 AM Result Value Ref Range    Magnesium 1.9 1.6 - 2.6 mg/dL   PROTHROMBIN TIME + INR    Collection Time: 03/14/22 11:20 AM   Result Value Ref Range    Prothrombin time 12.8 11.5 - 15.2 sec    INR 1.0 0.8 - 1.2     PTT    Collection Time: 03/14/22 11:20 AM   Result Value Ref Range    aPTT 28.8 23.0 - 36.4 SEC   TROPONIN-HIGH SENSITIVITY    Collection Time: 03/14/22 11:20 AM   Result Value Ref Range    Troponin-High Sensitivity 18 0 - 54 ng/L   TSH 3RD GENERATION    Collection Time: 03/14/22 11:20 AM   Result Value Ref Range    TSH 3.26 0.36 - 3.74 uIU/mL   URINALYSIS W/ RFLX MICROSCOPIC    Collection Time: 03/14/22 11:55 AM   Result Value Ref Range    Color YELLOW      Appearance CLEAR      Specific gravity 1.015 1.005 - 1.030      pH (UA) 7.5 5.0 - 8.0      Protein 30 (A) NEG mg/dL    Glucose 250 (A) NEG mg/dL    Ketone Negative NEG mg/dL    Bilirubin Negative NEG      Blood Negative NEG      Urobilinogen 0.2 0.2 - 1.0 EU/dL    Nitrites Negative NEG      Leukocyte Esterase Negative NEG     URINE MICROSCOPIC ONLY    Collection Time: 03/14/22 11:55 AM   Result Value Ref Range    WBC 0 to 3 0 - 4 /hpf    RBC NONE 0 - 5 /hpf    Epithelial cells FEW 0 - 5 /lpf    Bacteria Negative NEG /hpf       Radiologic Studies -   XR CHEST SNGL V   Final Result   1. No evidence of acute cardiopulmonary disease. CT HEAD WO CONT   Final Result      1. Right posterior scalp soft tissue contusion/hematoma. 2. No evidence of acute intracranial abnormality. 3. Mild burden of periventricular white matter presumed chronic microvascular   disease. CT SPINE CERV WO CONT   Final Result      1. No evidence of acute fracture or subluxation. 2. Multilevel advanced degenerative disc disease. Mild spinal canal stenosis and   right-sided high-grade foraminal stenosis as discussed. Medical Decision Making   I am the first provider for this patient.     I reviewed the vital signs, available nursing notes, past medical history, past surgical history, family history and social history. Vital Signs-Reviewed the patient's vital signs. EKG: Sinus bradycardia 51, no STEMI, interpreted by me    Records Reviewed: Nursing Notes, Old Medical Records, Previous Radiology Studies and Previous Laboratory Studies (Time of Review: 11:45 AM)    ED Course: Progress Notes, Reevaluation, and Consults:     Patient has mild hypokalemia, reassuring CT scans, and is alert and oriented. I placed 8 staples into her scalp with good approximation of her wound. We will discussed the case with cardiology given her recent follow-up regarding next steps. I discussed the case with her cardiologist Dr. Johnathan Nicole and knows the patient well. He would like the patient to stop her lisinopril altogether and follow-up in the office this week to see him. I will also consult neurology to see if empirically starting anticonvulsants would be helpful. Candi Fernandez DO 1:16 PM    I discussed the care with patient's son who lives in [place] and initially the daughter and patient were not willing to be admitted to the hospital although I felt it was reasonable we were able to arrange outpatient care plans the patient's son is encouraged the family to stay in the hospital.  Given the syncope versus seizure and the recurrent episodes that she is been having and her age will discuss admission with hospitalist.Hebert Morelos DO 2:35 PM    I discussed the case with the hospitalist who will meet the patient for further care. 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 Patient either has syncope of her seizure and will follow CT head, neck, update her tetanus, laceration repair, cardiac labs, orthostatics, urinalysis, and then reevaluate. Wound Repair    Date/Time: 3/14/2022 12:51 PM  Performed by: attendingPreparation: skin prepped with Shur-Clens  Pre-procedure re-eval: Immediately prior to the procedure, the patient was reevaluated and found suitable for the planned procedure and any planned medications. Location details: scalp  Wound length:2.6 - 7.5 cm  Anesthesia: local infiltration    Anesthesia:  Local Anesthetic: lidocaine 1% with epinephrine  Anesthetic total: 5 mL  Foreign bodies: no foreign bodies  Irrigation solution: saline  Debridement: none  Skin closure: staples  Number of sutures: 8  Patient tolerance: patient tolerated the procedure well with no immediate complications  My total time at bedside, performing this procedure was 1-15 minutes. Diagnosis     Clinical Impression:   1. Syncope and collapse    2. Closed head injury, initial encounter    3. Hypokalemia    4. Dehydration        Disposition: Admit     Follow-up Information     Follow up With Specialties Details Why Contact Info    Michelle Su MD Family Medicine In 10 weeks For suture removal, For wound re-check [address] [facility]  [address] [id number]      Sylvia Miner MD Neurology In 1 week  [address]      Mariann Krishnamurthy MD Cardiology, Internal Medicine On 3/17/2022  [address] [facility] [address] [facility]      [address] [facility] EMERGENCY DEPT Emergency Medicine  As needed, If symptoms worsen [address] [facility]  [phone]           Patient's Medications   Start Taking    LEVETIRACETAM (KEPPRA) 250 MG TABLET    Take 1 Tablet by mouth two (2) times a day. POTASSIUM CHLORIDE SR (K-TAB) 20 MEQ TABLET    Take 2 Tablets by mouth daily. Continue Taking    ASPIRIN DELAYED-RELEASE 81 MG TABLET    Take 1 Tablet by mouth daily. AZITHROMYCIN (ZITHROMAX) 250 MG TABLET    Take 250 mg by mouth daily. BIMATOPROST (LUMIGAN) 0.03 % OPHTHALMIC DROPS    Administer 1 Drop to both eyes every evening. CALCIUM CARBONATE/VITAMIN D3 (CALCIUM WITH VITAMIN D PO)    Take  by mouth. CYANOCOBALAMIN (VITAMIN B-12) 1,000 MCG TABLET    Take 1,000 mcg by mouth daily. DORZOLAMIDE HCL/TIMOLOL MALEAT (DORZOLAMIDE-TIMOLOL OP)    Apply  to eye. FOLIC ACID/MULTIVIT-MIN/LUTEIN (CENTRUM SILVER PO)    Take  by mouth. NEOMYCIN-POLYMYXIN-DEXAMETHASONE (DEXACINE) 3.5 MG/G-10,000 UNIT/G-0.1 % OPHTHALMIC OINTMENT        OMEPRAZOLE (PRILOSEC) 20 MG CAPSULE    Take 20 mg by mouth daily. PSYLLIUM HUSK (DAILY FIBER PO)    Take  by mouth. RIFABUTIN (MYCOBUTIN) 150 MG CAPSULE    rifabutin 150 mg capsule   Take 1 capsule every day by oral route. These Medications have changed    No medications on file   Stop Taking    LISINOPRIL-HYDROCHLOROTHIAZIDE (PRINZIDE, ZESTORETIC) 20-25 MG PER TABLET    Take 1 Tablet by mouth daily. ROSUVASTATIN (CRESTOR) 5 MG TABLET    Take 1 Tablet by mouth nightly. Disclaimer: Sections of this note are dictated using utilizing voice recognition software. Minor typographical errors may be present. If questions arise, please do not hesitate to contact me or call our department.

## 2022-03-14 NOTE — ED TRIAGE NOTES
Patient's daughter, Suresh Bautista, provides information related to this visit. She states that she heard patient fall approximately one hour ago. She states arriving to scene of fall immediately and found patient with eyes open and shaking all over. She states patient was able to communicate with her quickly after incident. Patient noted to have laceration to right posterior head. Bleeding controlled at time of triage. Denies hx of seizures. States patient is currently being treated for fungal infection, mycobacterium avium intracellulare,  in lung. Patient states experiencing dizziness prior to fall. Daughter states patient had similar fall in February, but without injury.

## 2022-03-14 NOTE — TELEPHONE ENCOUNTER
Discussed with patient's daughter. Patient in emergency room after a fall. She will hold her lisinopril upon discharge if no specific etiology is noted.   Follow-up after ER visit

## 2022-03-14 NOTE — ROUTINE PROCESS
TRANSFER - OUT REPORT:    Verbal report given to Jaime Herrera RN(name) on Jermaine Vera  being transferred to [facility] room 212(unit) for routine progression of care       Report consisted of patients Situation, Background, Assessment and   Recommendations(SBAR). Information from the following report(s) ED Summary was reviewed with the receiving nurse. Lines:   Peripheral IV 03/14/22 Left Hand (Active)   Site Assessment Clean, dry, & intact 03/14/22 1115   Phlebitis Assessment 0 03/14/22 1115   Infiltration Assessment 0 03/14/22 1115   Dressing Status Clean, dry, & intact 03/14/22 1115   Hub Color/Line Status Yellow;Patent; Flushed 03/14/22 1115       Peripheral IV 03/14/22 Left Antecubital (Active)   Site Assessment Clean, dry, & intact 03/14/22 1116   Phlebitis Assessment 0 03/14/22 1116   Infiltration Assessment 0 03/14/22 1116   Dressing Status Clean, dry, & intact 03/14/22 1116   Dressing Type Transparent 03/14/22 1116   Hub Color/Line Status Blue;Patent 03/14/22 1116   Action Taken Blood drawn 03/14/22 1116        Opportunity for questions and clarification was provided. Patient transported with:   Monitor.  INT x2

## 2022-03-14 NOTE — TELEPHONE ENCOUNTER
Daughter called and stated patient passed out this weekend. States BP has been normal. States patients son is a doctor and several months ago cut back on her Lisinopril-Hydrochlorothiazide 20-25 mg to one half tab a day stating it is to much for her weight. Please call daughter in ref to this.

## 2022-03-15 ENCOUNTER — APPOINTMENT (OUTPATIENT)
Dept: VASCULAR SURGERY | Age: 82
DRG: 312 | End: 2022-03-15
Attending: INTERNAL MEDICINE
Payer: MEDICARE

## 2022-03-15 ENCOUNTER — APPOINTMENT (OUTPATIENT)
Dept: CT IMAGING | Age: 82
DRG: 312 | End: 2022-03-15
Attending: SURGERY
Payer: MEDICARE

## 2022-03-15 ENCOUNTER — APPOINTMENT (OUTPATIENT)
Dept: NON INVASIVE DIAGNOSTICS | Age: 82
DRG: 312 | End: 2022-03-15
Attending: NURSE PRACTITIONER
Payer: MEDICARE

## 2022-03-15 LAB
ANION GAP SERPL CALC-SCNC: 5 MMOL/L (ref 3–18)
BASOPHILS # BLD: 0 K/UL (ref 0–0.1)
BASOPHILS NFR BLD: 0 % (ref 0–2)
BUN SERPL-MCNC: 17 MG/DL (ref 7–18)
BUN/CREAT SERPL: 27 (ref 12–20)
CALCIUM SERPL-MCNC: 8.2 MG/DL (ref 8.5–10.1)
CHLORIDE SERPL-SCNC: 98 MMOL/L (ref 100–111)
CO2 SERPL-SCNC: 29 MMOL/L (ref 21–32)
CREAT SERPL-MCNC: 0.64 MG/DL (ref 0.6–1.3)
DIFFERENTIAL METHOD BLD: ABNORMAL
ECHO EST RA PRESSURE: 3 MMHG
ECHO LV FRACTIONAL SHORTENING: 26 % (ref 28–44)
ECHO LV INTERNAL DIMENSION DIASTOLE INDEX: 2.77 CM/M2
ECHO LV INTERNAL DIMENSION DIASTOLIC: 3.8 CM (ref 3.9–5.3)
ECHO LV INTERNAL DIMENSION SYSTOLIC INDEX: 2.04 CM/M2
ECHO LV INTERNAL DIMENSION SYSTOLIC: 2.8 CM
ECHO LV IVSD: 1.3 CM (ref 0.6–0.9)
ECHO LV MASS 2D: 153.2 G (ref 67–162)
ECHO LV MASS INDEX 2D: 111.9 G/M2 (ref 43–95)
ECHO LV POSTERIOR WALL DIASTOLIC: 1.1 CM (ref 0.6–0.9)
ECHO LV RELATIVE WALL THICKNESS RATIO: 0.58
ECHO RIGHT VENTRICULAR SYSTOLIC PRESSURE (RVSP): 20 MMHG
ECHO TV REGURGITANT MAX VELOCITY: 2.04 M/S
ECHO TV REGURGITANT PEAK GRADIENT: 17 MMHG
EOSINOPHIL # BLD: 0 K/UL (ref 0–0.4)
EOSINOPHIL NFR BLD: 1 % (ref 0–5)
ERYTHROCYTE [DISTWIDTH] IN BLOOD BY AUTOMATED COUNT: 12.5 % (ref 11.6–14.5)
FERRITIN SERPL-MCNC: 501 NG/ML (ref 8–388)
FOLATE SERPL-MCNC: 18.4 NG/ML (ref 3.1–17.5)
GLUCOSE SERPL-MCNC: 145 MG/DL (ref 74–99)
HCT VFR BLD AUTO: 31.6 % (ref 35–45)
HGB BLD-MCNC: 10.9 G/DL (ref 12–16)
IMM GRANULOCYTES # BLD AUTO: 0 K/UL (ref 0–0.04)
IMM GRANULOCYTES NFR BLD AUTO: 0 % (ref 0–0.5)
IRON SATN MFR SERPL: 16 % (ref 20–50)
IRON SERPL-MCNC: 35 UG/DL (ref 50–175)
LEFT CCA DIST DIAS: 8.8 CM/S
LEFT CCA DIST SYS: 52.9 CM/S
LEFT CCA MID DIAS: 9.47 CM/S
LEFT CCA MID SYS: 49.32 CM/S
LEFT CCA PROX DIAS: 12.4 CM/S
LEFT CCA PROX SYS: 67.4 CM/S
LEFT ECA DIAS: 6.92 CM/S
LEFT ECA SYS: 81.7 CM/S
LEFT ICA DIST DIAS: 18.3 CM/S
LEFT ICA DIST SYS: 112 CM/S
LEFT ICA MID DIAS: 20.3 CM/S
LEFT ICA MID SYS: 144.5 CM/S
LEFT ICA PROX DIAS: 39.8 CM/S
LEFT ICA PROX SYS: 226.5 CM/S
LEFT ICA/CCA SYS: 4.28
LEFT VERTEBRAL DIAS: 13.52 CM/S
LEFT VERTEBRAL SYS: 58.6 CM/S
LYMPHOCYTES # BLD: 1.2 K/UL (ref 0.9–3.6)
LYMPHOCYTES NFR BLD: 35 % (ref 21–52)
MCH RBC QN AUTO: 30 PG (ref 24–34)
MCHC RBC AUTO-ENTMCNC: 34.5 G/DL (ref 31–37)
MCV RBC AUTO: 87.1 FL (ref 78–100)
MONOCYTES # BLD: 0.5 K/UL (ref 0.05–1.2)
MONOCYTES NFR BLD: 13 % (ref 3–10)
NEUTS SEG # BLD: 1.7 K/UL (ref 1.8–8)
NEUTS SEG NFR BLD: 50 % (ref 40–73)
NRBC # BLD: 0 K/UL (ref 0–0.01)
NRBC BLD-RTO: 0 PER 100 WBC
PLATELET # BLD AUTO: 147 K/UL (ref 135–420)
PMV BLD AUTO: 11.8 FL (ref 9.2–11.8)
POTASSIUM SERPL-SCNC: 3.1 MMOL/L (ref 3.5–5.5)
RBC # BLD AUTO: 3.63 M/UL (ref 4.2–5.3)
RIGHT CCA DIST DIAS: 12.1 CM/S
RIGHT CCA DIST SYS: 61 CM/S
RIGHT CCA MID DIAS: 10.38 CM/S
RIGHT CCA MID SYS: 58.36 CM/S
RIGHT CCA PROX DIAS: 10.3 CM/S
RIGHT CCA PROX SYS: 80.7 CM/S
RIGHT ECA DIAS: 5.81 CM/S
RIGHT ECA SYS: 97.9 CM/S
RIGHT ICA DIST DIAS: 13 CM/S
RIGHT ICA DIST SYS: 51.4 CM/S
RIGHT ICA MID DIAS: 10.4 CM/S
RIGHT ICA MID SYS: 44.4 CM/S
RIGHT ICA PROX DIAS: 6 CM/S
RIGHT ICA PROX SYS: 55.7 CM/S
RIGHT ICA/CCA SYS: 0.9
RIGHT VERTEBRAL DIAS: 7.5 CM/S
RIGHT VERTEBRAL SYS: 43.3 CM/S
SODIUM SERPL-SCNC: 132 MMOL/L (ref 136–145)
TIBC SERPL-MCNC: 225 UG/DL (ref 250–450)
VAS LEFT SUBCLAVIAN PROX EDV: 1.4 CM/S
VAS LEFT SUBCLAVIAN PROX PSV: 87.2 CM/S
VAS RIGHT SUBCLAVIAN PROX EDV: 0 CM/S
VAS RIGHT SUBCLAVIAN PROX PSV: 107.6 CM/S
VIT B12 SERPL-MCNC: 1046 PG/ML (ref 211–911)
WBC # BLD AUTO: 3.4 K/UL (ref 4.6–13.2)

## 2022-03-15 PROCEDURE — 80048 BASIC METABOLIC PNL TOTAL CA: CPT

## 2022-03-15 PROCEDURE — 74011000250 HC RX REV CODE- 250: Performed by: NURSE PRACTITIONER

## 2022-03-15 PROCEDURE — 82728 ASSAY OF FERRITIN: CPT

## 2022-03-15 PROCEDURE — 93308 TTE F-UP OR LMTD: CPT

## 2022-03-15 PROCEDURE — 99223 1ST HOSP IP/OBS HIGH 75: CPT | Performed by: INTERNAL MEDICINE

## 2022-03-15 PROCEDURE — 74011250636 HC RX REV CODE- 250/636: Performed by: NURSE PRACTITIONER

## 2022-03-15 PROCEDURE — 97161 PT EVAL LOW COMPLEX 20 MIN: CPT

## 2022-03-15 PROCEDURE — 70498 CT ANGIOGRAPHY NECK: CPT

## 2022-03-15 PROCEDURE — 99232 SBSQ HOSP IP/OBS MODERATE 35: CPT | Performed by: INTERNAL MEDICINE

## 2022-03-15 PROCEDURE — 65660000000 HC RM CCU STEPDOWN

## 2022-03-15 PROCEDURE — 97165 OT EVAL LOW COMPLEX 30 MIN: CPT

## 2022-03-15 PROCEDURE — 97116 GAIT TRAINING THERAPY: CPT

## 2022-03-15 PROCEDURE — 85025 COMPLETE CBC W/AUTO DIFF WBC: CPT

## 2022-03-15 PROCEDURE — 36415 COLL VENOUS BLD VENIPUNCTURE: CPT

## 2022-03-15 PROCEDURE — 83540 ASSAY OF IRON: CPT

## 2022-03-15 PROCEDURE — 97535 SELF CARE MNGMENT TRAINING: CPT

## 2022-03-15 PROCEDURE — 74011250637 HC RX REV CODE- 250/637: Performed by: INTERNAL MEDICINE

## 2022-03-15 PROCEDURE — 74011250637 HC RX REV CODE- 250/637: Performed by: NURSE PRACTITIONER

## 2022-03-15 PROCEDURE — 93880 EXTRACRANIAL BILAT STUDY: CPT

## 2022-03-15 PROCEDURE — 2709999900 HC NON-CHARGEABLE SUPPLY

## 2022-03-15 PROCEDURE — 74011000636 HC RX REV CODE- 636: Performed by: INTERNAL MEDICINE

## 2022-03-15 PROCEDURE — 82607 VITAMIN B-12: CPT

## 2022-03-15 RX ORDER — TIMOLOL MALEATE 5 MG/ML
1 SOLUTION/ DROPS OPHTHALMIC 2 TIMES DAILY
COMMUNITY
Start: 2022-01-19

## 2022-03-15 RX ORDER — FAMOTIDINE 20 MG/1
20 TABLET, FILM COATED ORAL DAILY
Status: DISCONTINUED | OUTPATIENT
Start: 2022-03-16 | End: 2022-03-17 | Stop reason: HOSPADM

## 2022-03-15 RX ORDER — LANOLIN ALCOHOL/MO/W.PET/CERES
12 CREAM (GRAM) TOPICAL
Status: DISCONTINUED | OUTPATIENT
Start: 2022-03-15 | End: 2022-03-17 | Stop reason: HOSPADM

## 2022-03-15 RX ADMIN — POTASSIUM BICARBONATE 40 MEQ: 782 TABLET, EFFERVESCENT ORAL at 17:17

## 2022-03-15 RX ADMIN — ENOXAPARIN SODIUM 40 MG: 100 INJECTION SUBCUTANEOUS at 08:37

## 2022-03-15 RX ADMIN — RIFAMPIN 150 MG: 150 CAPSULE ORAL at 08:37

## 2022-03-15 RX ADMIN — SODIUM CHLORIDE, PRESERVATIVE FREE 10 ML: 5 INJECTION INTRAVENOUS at 14:00

## 2022-03-15 RX ADMIN — AZITHROMYCIN DIHYDRATE 250 MG: 250 TABLET, FILM COATED ORAL at 08:37

## 2022-03-15 RX ADMIN — SODIUM CHLORIDE, PRESERVATIVE FREE 10 ML: 5 INJECTION INTRAVENOUS at 07:05

## 2022-03-15 RX ADMIN — SODIUM CHLORIDE, PRESERVATIVE FREE 10 ML: 5 INJECTION INTRAVENOUS at 22:31

## 2022-03-15 RX ADMIN — FAMOTIDINE 20 MG: 20 TABLET, FILM COATED ORAL at 08:37

## 2022-03-15 RX ADMIN — IOPAMIDOL 80 ML: 755 INJECTION, SOLUTION INTRAVENOUS at 22:59

## 2022-03-15 NOTE — CONSULTS
Cardiology Initial Patient Referral Note    Cardiology referral request from Dr. Flavio Mahajan for evaluation and management/treatment of syncope. Date of  Admission: 3/14/2022 10:51 AM   Primary Care Physician:  Bonnie Virk MD    Attending Cardiologist: Dr. Zimmerman [facility]:     Hospital Problems  Date Reviewed: 2/2/2022          Codes Class Noted POA    Syncope and collapse ICD-10-CM: R55  ICD-9-CM: 780.2  3/14/2022 Unknown        Hypokalemia ICD-10-CM: E87.6  ICD-9-CM: 276.8  3/14/2022 Unknown              -Syncope, recurrent. Suspect orthostasis/hypotension in setting of below.  -Hypertension. Currently elevated but BP meds recently decreased for low BP. -Chronic sinus bradycardia. No pauses or high grade AV block. PVCs and PACs noted. Will continue to follow on telemetry off AV gemma agents.   -Possible seizure, will appreciate neurology evaluation.  -Pancreatic mass for which outpatient evaluation was pending, will appreciate GI evaluation. 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  -Right posterior scalp soft tissue contusion/hematoma. -Unintentional weight loss. -Hypokalemia. Replace as needed. -Hyponatremia. Continue to trend.  -Anemia. Continue to trend.  -Hyperlipidemia. No longer on statin due to intolerance. Primary cardiologist Dr. Chase Hector      Echo 6/2021  · LV: Estimated LVEF is 55 - 60%. Normal cavity size, wall thickness and systolic function (ejection fraction normal). Wall motion: normal. Mild (grade 1) left ventricular diastolic dysfunction. · AV: Mild aortic valve regurgitation is present. · TV: Right Ventricular Arterial Pressure (RVSP) is 25 mmHg. · AO: Mild aortic root and ascending aorta dilatation. Aortic root diameter = 3.9 cm. Ascending aorta diameter = 4 cm. Nuclear stress 6/2021  · Baseline ECG: Normal sinus rhythm.   · Stress test: Negative stress test.  · SPECT: Left ventricular function post-stress was normal. Calculated ejection fraction is 72%. There is no evidence of transient ischemic dilation (TID). The TID ratio is 0.89. · SPECT: Left ventricular perfusion is normal. Myocardial perfusion imaging supports a low risk stress test.         Plan: Will continue close telemetry monitoring. Would continue to hold any AV gemma agents, including recent timolol eye drops. Will review orthostatic vitals. Will review echocardiogram.  Will review carotid doppler when complete. Will appreciate GI, ID, neurology and nutritionist evaluation. Patients daughter is at bedside, but has a brother in [place] who is an ER doctor who would like updates. History of Present Illness: This is a [de-identified] y.o. female admitted for Syncope and collapse [R55]  Hypokalemia [E87.6]. Patient complains of: Syncope. Patient is an [de-identified]year old female with history of HTN, hyperlipidemia. Patients daughter heard patient fall from other room. Patient was on the ground, confusion and possible seizure activity noted by daughter. Patient thinks she was sitting in chair and got dizzy. Patient has had a previous syncopal episode. She was standing and felt dizzy and fell to the ground. Patient has had recent low BP for which BP meds were decreased. Patient has had recent unintentional weight loss. Patient has had recent mycobacterial infection. Patient has had recent pancreatic mass for which she was to have outpatient evaluation. Patient denies any CP, palp, SOB. Cardiac risk factors: dyslipidemia, hypertension      Review of Symptoms:  Except as stated above include:  Constitutional:  negative  Respiratory:  negative  Cardiovascular:  C/o syncope.  Denies CP, palp, SOB  Gastrointestinal: negative  Genitourinary:  negative  Musculoskeletal:  Negative  Neurological:  Negative  Dermatological:  Negative  Endocrinological: Negative  Psychological:  Negative       Past Medical History:     Past Medical History:   Diagnosis Date    Diverticulosis of sigmoid colon 11/13/15    mild; Dr. Josefina Hope Eczema     Elevated cholesterol     Family history of diabetes mellitus (DM)     Glaucoma     Hyperlipidemia     Hypertension     Indigestion     Interpolated PVCs          Social History:     Social History     Socioeconomic History    Marital status:    Tobacco Use    Smoking status: Never Smoker    Smokeless tobacco: Never Used   Substance and Sexual Activity    Alcohol use: No     Comment: with dinner a few days per week    Drug use: No    Sexual activity: Not Currently        Family History:     Family History   Problem Relation Age of Onset   Arce Glaucoma Mother     Other Father         cerebral aneurysm    Hypertension Sister     Diabetes Sister     Hypertension Sister     Diabetes Paternal Uncle         Medications:      Allergies   Allergen Reactions    Pravastatin Other (comments)     Arthralgias/myalgias        Current Facility-Administered Medications   Medication Dose Route Frequency    sodium chloride (NS) flush 5-40 mL  5-40 mL IntraVENous Q8H    sodium chloride (NS) flush 5-40 mL  5-40 mL IntraVENous PRN    acetaminophen (TYLENOL) tablet 650 mg  650 mg Oral Q6H PRN    Or    acetaminophen (TYLENOL) suppository 650 mg  650 mg Rectal Q6H PRN    polyethylene glycol (MIRALAX) packet 17 g  17 g Oral DAILY PRN    ondansetron (ZOFRAN ODT) tablet 4 mg  4 mg Oral Q8H PRN    Or    ondansetron (ZOFRAN) injection 4 mg  4 mg IntraVENous Q6H PRN    enoxaparin (LOVENOX) injection 40 mg  40 mg SubCUTAneous DAILY    famotidine (PEPCID) tablet 20 mg  20 mg Oral BID    azithromycin (ZITHROMAX) tablet 250 mg  250 mg Oral DAILY    rifAMPin (RIFADIN) capsule 150 mg  150 mg Oral ACB         Physical Exam:     Visit Vitals  BP (!) 159/78 (BP Patient Position: At rest;Standing)   Pulse (!) 58   Temp 98.1 °F (36.7 °C)   Resp 18   Ht 5' (1.524 m)   Wt 96 lb 12.8 oz (43.9 kg)   SpO2 98% Breastfeeding No   BMI 18.90 kg/m²       TELE: sinus claudio, PACs, PVCs    BP Readings from Last 3 Encounters:   03/15/22 (!) 159/78   02/16/22 (!) 155/64   12/17/21 (!) 148/68     Pulse Readings from Last 3 Encounters:   03/15/22 (!) 58   02/16/22 (!) 56   12/17/21 65     Wt Readings from Last 3 Encounters:   03/14/22 96 lb 12.8 oz (43.9 kg)   02/16/22 100 lb (45.4 kg)   12/17/21 99 lb (44.9 kg)       General:  alert, cooperative, no distress, appears stated age  Neck:  no JVD  Lungs:  clear to auscultation bilaterally  Heart:  regular rate and rhythm  Abdomen:  abdomen is soft without significant tenderness, masses, organomegaly or guarding  Extremities:  extremities normal, atraumatic, no cyanosis or edema  Skin: Warm and dry. no hyperpigmentation, vitiligo, or suspicious lesions  Neuro: alert, oriented x3, affect appropriate  Psych: non focal     Data Review:     Recent Labs     03/15/22  0354 03/14/22  1120   WBC 3.4* 5.4   HGB 10.9* 12.5   HCT 31.6* 35.6    157     Recent Labs     03/15/22  0354 03/14/22  1120   * 134*   K 3.1* 3.4*   CL 98* 95*   CO2 29 33*   * 160*   BUN 17 16   CREA 0.64 0.78   CA 8.2* 8.3*   MG  --  1.9   ALB  --  3.2*   ALT  --  36   INR  --  1.0       Results for orders placed or performed during the hospital encounter of 03/14/22   EKG, 12 LEAD, INITIAL   Result Value Ref Range    Ventricular Rate 51 BPM    Atrial Rate 288 BPM    QRS Duration 82 ms    Q-T Interval 424 ms    QTC Calculation (Bezet) 390 ms    Calculated R Axis -3 degrees    Calculated T Axis 66 degrees    Diagnosis       Sinus bradycardia  Cannot exclude anterior MI versus lead placement issue. Abnormal ECG  When compared with ECG of 19-FEB-2021 16:56,  No significant change was found  Confirmed by Claudell Gent, MD, Troy Malik [id number]) on 3/14/2022 4:03:16 PM     Results for orders placed or performed in visit on 05/23/12   AMB POC EKG ROUTINE W/ 12 LEADS, INTER & REP    Impression    #1.  Sinus bradycardia at 41 beats per minute. EKG is otherwise normal       All Cardiac Markers in the last 24 hours:  No results found for: CPK, CK, CKMMB, CKMB, RCK3, CKMBT, CKNDX, CKND1, JERZY, TROPT, TROIQ, SANTIAGO, TROPT, TNIPOC, BNP, BNPP    Last Lipid:    Lab Results   Component Value Date/Time    Cholesterol, total 229 (H) 11/05/2021 09:09 AM    HDL Cholesterol 60 11/05/2021 09:09 AM    LDL, calculated 149.4 (H) 11/05/2021 09:09 AM    Triglyceride 98 11/05/2021 09:09 AM    CHOL/HDL Ratio 3.8 11/05/2021 09:09 AM       Cardiographics:     EKG Results     Procedure 720 Value Units Date/Time    EKG, 12 LEAD, INITIAL [[id number]] Collected: 03/14/22 1020    Order Status: Completed Updated: 03/14/22 1603     Ventricular Rate 51 BPM      Atrial Rate 288 BPM      QRS Duration 82 ms      Q-T Interval 424 ms      QTC Calculation (Bezet) 390 ms      Calculated R Axis -3 degrees      Calculated T Axis 66 degrees      Diagnosis --     Sinus bradycardia  Cannot exclude anterior MI versus lead placement issue. Abnormal ECG  When compared with ECG of 19-FEB-2021 16:56,  No significant change was found  Confirmed by Kellie Sen MD, [facility]u [facility] ([id number]) on 3/14/2022 4:03:16 PM          06/04/21    ECHO ADULT COMPLETE 06/04/2021 6/4/2021    Interpretation Summary  · LV: Estimated LVEF is 55 - 60%. Normal cavity size, wall thickness and systolic function (ejection fraction normal). Wall motion: normal. Mild (grade 1) left ventricular diastolic dysfunction. · AV: Mild aortic valve regurgitation is present. · TV: Right Ventricular Arterial Pressure (RVSP) is 25 mmHg. · AO: Mild aortic root and ascending aorta dilatation. Aortic root diameter = 3.9 cm. Ascending aorta diameter = 4 cm. Signed by: Leda Willett MD on 6/4/2021  8:27 AM      06/04/21    NUCLEAR CARDIAC STRESS TEST 06/04/2021 7/22/2021    Interpretation Summary  · Baseline ECG: Normal sinus rhythm.   · Stress test: Negative stress test.  · SPECT: Left ventricular function post-stress was normal. Calculated ejection fraction is 72%. There is no evidence of transient ischemic dilation (TID). The TID ratio is 0.89. · SPECT: Left ventricular perfusion is normal. Myocardial perfusion imaging supports a low risk stress test.    Signed by: Tonie Field MD on 6/4/2021 10:21 AM        XR Results (most recent):  Results from Hospital Encounter encounter on 03/14/22    XR CHEST SNGL V    Narrative  PORTABLE CHEST X-RAY    CPT CODE: 50005    INDICATION: Syncope. COMPARISON: Plain film 2/19/2021. FINDINGS:  Heart size normal. Aortic arch not enlarged. Small amount of calcification in  the arch  No pneumothorax appreciated. No overt pulmonary edema. No focal infiltrate or consolidation. No significant effusion on portable AP  image. Thoracolumbar curvature convex to the right, similar. Impression  1. No evidence of acute cardiopulmonary disease.         Signed By: LICHA Mclaughlin     March 15, 2022

## 2022-03-15 NOTE — CONSULTS
[facility]  (A Division of [address] [place] [address])      Consultation Note      Date of Admission: 3/14/2022    Date of Note: 3/15/2022      Reason for Referral: WESLEY  Referring Physician: Zhao Liu NP    Micro from this admission:   none    Current Antimicrobials:    Prior Antimicrobials:  Azithromycin   Rifabitin        Assessment:         Pulmonary WESLEY-followed by Dr. Johnny Kawasaki. bronchoscopy 5/19/21. Current chest x-ray without significant infiltrates. Recurrent syncope and falls:  Suspect related to orthostatics  Bradycardia:  Cardiology following. Unlikley related to azithromycin as not documented QT prolongation. 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 GI following- Suggests EGD? EUS  Unexplained weight loss:  ? Related to WESLEY vs pancreatic process. Electrolyte abnormalities. Plan:   No current contraindication in continuing azithromycin and rifabutin at this time.   -Biggest concern would have been QT prolongation which may or may not have been contributing to bradycardia however this has not been noted on EKG nor telemetry since her admission.   -If new medications are added please review for potential drug drug interactions with both azithromycin and rifabutin.   -Note that her pulmonologist has not yet started ethambutol. Given her acute issues I will defer adding this medication until after she is discharged    Jared Dunn, [facility]  [address], [address] [facility] [address]  Office: [phone]  Mobile/Text: [phone]    Lines / Catheters:  peripheral    HPI:  Ms. Jasvir Simpson is a pleasant 62-year-old female with a past medical history significant for dyslipidemia, hypertension, eczema, Mycobacterium avium infection who is presenting to the emergency department with recurrent syncope and frequent falls. During this episode she was at home when her daughter had heard her fall and found her on the floor where she was reportedly unresponsive for about 1 minute. During this episode she had sustained a laceration on the right side of her head. She was noted to have bradycardia and cardiology has been consulted. There is no evidence of pauses or high-grade AV blocks noted. She does have an occasional PVC and PAC AC. There is no evidence of QT prolongation. Additionally throughout imaging she was noted to have a pancreatic mass which has been evaluated by GI and is felt to be a cyst at the head of the pancreas which is concerning for is slightly higher risk process such as a mucinous neoplasm. In regards to her WESLEY, she has been followed by Dr. Tray Pederson as an outpatient for the last several years. She has had serial CT scans to follow her disease. She was formally diagnosed with bronchoscopy with WESLEY growing from AFB cultures from bronch samples on 5/19/21 ( 2 AFB cx +, AFB stain negative). She was initially started on azithromycin at the beginning of February then rifabutin 150 mg was added to this regimen. According to the care plan as per Dr. Tray Pederson they will be adding ethambutol once daily. Per patient and her daughter decision was made to initiate therapy in a stepwise basis as the patient was felt to be frail and losing weight without apparent reason. Thus far she has been tolerating her therapy. Patient and family are aware that medications are given for about 1 year and have been counseled in regards to potential side effects and drug drug interactions.          Past Medical History:   Diagnosis Date    Diverticulosis of sigmoid colon 11/13/15    mild; Dr. Amaya Georges Eczema     Elevated cholesterol     Family history of diabetes mellitus (DM)     Glaucoma     Hyperlipidemia     Hypertension     Indigestion     Interpolated PVCs      Past Surgical History:   Procedure Laterality Date    HX BREAST BIOPSY      HX HYSTERECTOMY  1981    STEPHAN/BSO    HX OOPHORECTOMY      right     Family History   Problem Relation Age of Onset   [facility] Glaucoma Mother     Other Father         cerebral aneurysm    Hypertension Sister     Diabetes Sister     Hypertension Sister     Diabetes Paternal Uncle      Medications reviewed as below:   Current Facility-Administered Medications   Medication Dose Route Frequency Provider Last Rate Last Admin    [START ON 3/16/2022] famotidine (PEPCID) tablet 20 mg  20 mg Oral DAILY Angelique Agustin NP        sodium chloride (NS) flush 5-40 mL  5-40 mL IntraVENous Q8H Angelique Agustin NP   10 mL at 03/15/22 1400    sodium chloride (NS) flush 5-40 mL  5-40 mL IntraVENous PRN Radha Agustin NP        acetaminophen (TYLENOL) tablet 650 mg  650 mg Oral Q6H PRN Radha Agustin NP        Or    acetaminophen (TYLENOL) suppository 650 mg  650 mg Rectal Q6H PRN Angelique Agustin NP        polyethylene glycol (MIRALAX) packet 17 g  17 g Oral DAILY PRN Angelique Agustin NP        ondansetron (ZOFRAN ODT) tablet 4 mg  4 mg Oral Q8H PRN Angelique Agustin NP        Or    ondansetron (ZOFRAN) injection 4 mg  4 mg IntraVENous Q6H PRN Angelique Agustin NP        enoxaparin (LOVENOX) injection 40 mg  40 mg SubCUTAneous DAILY Angelique Agustin NP   40 mg at 03/15/22 2559    azithromycin (ZITHROMAX) tablet 250 mg  250 mg Oral DAILY Angelique Agustin NP   250 mg at 03/15/22 3990    rifAMPin (RIFADIN) capsule 150 mg  150 mg Oral ACB Angelique Agustin NP   150 mg at 03/15/22 2434     Allergies   Allergen Reactions    Pravastatin Other (comments)     Arthralgias/myalgias     Social History     Socioeconomic History    Marital status:      Spouse name: Not on file    Number of children: Not on file    Years of education: Not on file    Highest education level: Not on file   Occupational History  Not on file   Tobacco Use    Smoking status: Never Smoker    Smokeless tobacco: Never Used   Substance and Sexual Activity    Alcohol use: No     Comment: with dinner a few days per week    Drug use: No    Sexual activity: Not Currently   Other Topics Concern    Not on file   Social History Narrative    Not on file     Social Determinants of Health     Financial Resource Strain:     Difficulty of Paying Living Expenses: Not on file   Food Insecurity:     Worried About Running Out of Food in the Last Year: Not on file    Dea of Food in the Last Year: Not on file   Transportation Needs:     Lack of Transportation (Medical): Not on file    Lack of Transportation (Non-Medical): Not on file   Physical Activity:     Days of Exercise per Week: Not on file    Minutes of Exercise per Session: Not on file   Stress:     Feeling of Stress : Not on file   Social Connections:     Frequency of Communication with Friends and Family: Not on file    Frequency of Social Gatherings with Friends and Family: Not on file    Attends Denominational Services: Not on file    Active Member of [address] [place] [address] or Organizations: Not on file    Attends Club or Organization Meetings: Not on file    Marital Status: Not on file   Intimate Partner Violence:     Fear of Current or Ex-Partner: Not on file    Emotionally Abused: Not on file    Physically Abused: Not on file    Sexually Abused: Not on file   Housing Stability:     Unable to Pay for Housing in the Last Year: Not on file    Number of [place] in the Last Year: Not on file    Unstable Housing in the Last Year: Not on file        Review of Systems    Negative Unless BOLDED    General: fevers, chills, myalgias, arthralgias, unexplained weight loss, malaise, fatigue.   HEENT:  headaches,sinus pain or presure, recent URI, recent dental procedures;  tinnitus, hearing loss , visual changes, catarats, dizziness or blurred vision  PUlMONARY:  cough , shortness of breath, sputum production, hx of asthma or COPD. previous treatement for TB or PPD. Cardiovascular: chest pain, previous CAD/MI, vavlular heart disease,  murmurs  GI:   nausea, vomiting, diarrhea, abdominal pain, prior C.diff  :  urinary frequency, dysuria, hematuria, bladder incontinence. Neurologic:  seizures, syncope or prior CVA/TIA, confusion, memory impairment, neuropathy  Musculoskeletal:  myalgias arthralgias, joint pain/ swelling,  back pain  Skin:  Purities,  recurrent cellulitis,  chronic stasis ulcer, diabetic foot ulcers  Endocrine: polyuria, polydipsia, hair loss, weight gain  Psych: Denies depression or treatment by a psychiatrist/psycologist  Heme-Onc: prior DVT, easy bruising, fatigue, malignancy        Objective:        Visit Vitals  BP (!) 161/66 (BP 1 Location: Left upper arm, BP Patient Position: At rest;Semi fowlers)   Pulse 62   Temp 98.3 °F (36.8 °C)   Resp 18   Ht 5' (1.524 m)   Wt 43.5 kg (96 lb)   SpO2 98%   Breastfeeding No   BMI 18.75 kg/m²     Temp (24hrs), Av.1 °F (36.7 °C), Min:97.7 °F (36.5 °C), Max:98.3 °F (36.8 °C)        General:   awake alert and oriented   Skin:   no rashes or skin lesions noted on limited exam; clean dressing on her head   HEENT:  Normocephalic, atraumatic, PERRL, EOMI, no scleral icterus or pallor; no conjunctival hemmohage;  nasal and oral mucous are moist and without evidence of lesions. No thrush. Dentition fair. Neck supple, no bruits. Lymph Nodes:   no cervical, axillary or inguinal adenopathy   Lungs:   non-labored, bilaterally clear to aspiration- no crackles wheezes rales or rhonchi   Heart:  bradycardic, s1 and s2; no murmurs rubs or gallops, no edema, + pedal pulses   Abdomen:  soft, non-distended, active bowel sounds, no hepatomegaly, no splenomegaly. Non-tender   Genitourinary:  deferred   Extremities:   no clubbing, cyanosis; no joint effusions or swelling;  Full ROM of all large joints to the upper and lower extremities; muscle mass appropriate for age   Neurologic:  No gross focal sensory abnormalities; 5/5 muscle strength to upper and lower extremities. Speech appropirate. Cranial nerves intact   Psychiatric:   appropriate and interactive. Labs: Results:   Chemistry Recent Labs     03/15/22  0354 03/14/22  1120   * 160*   * 134*   K 3.1* 3.4*   CL 98* 95*   CO2 29 33*   BUN 17 16   CREA 0.64 0.78   CA 8.2* 8.3*   AGAP 5 6   BUCR 27* 21*   AP  --  61   TP  --  6.8   ALB  --  3.2*   GLOB  --  3.6   AGRAT  --  0.9      CBC w/Diff Recent Labs     03/15/22  0354 03/14/22  1120   WBC 3.4* 5.4   RBC 3.63* 4.05*   HGB 10.9* 12.5   HCT 31.6* 35.6    157   GRANS 50 73   LYMPH 35 17*   EOS 1 1            No results found for: SDES No results found for: CULT     Results     ** No results found for the last 336 hours. **            Imaging:      All imaging reviewed from Admission to present as per radiology interpretation in [facility]

## 2022-03-15 NOTE — PROGRESS NOTES
Problem: Self Care Deficits Care Plan (Adult)  Goal: *Acute Goals and Plan of Care (Insert Text)  Outcome: Resolved/Met       OCCUPATIONAL THERAPY EVALUATION/DISCHARGE    Patient: Lesvia Barkley [de-identified] y.o. female)  Date: 3/15/2022  Primary Diagnosis: Syncope and collapse [R55]  Hypokalemia [E87.6]  Precautions Fall,Seizure  PLOF: Patient lives alone and was independent with self-care and functional mobility PTA. Patient daughter visiting her and reports she is able to stay for a week for support as they figure out future arrangements. ASSESSMENT AND RECOMMENDATIONS:  Patient cleared to participate in OT evaluation by RN. Upon entering the room, patient was supine in bed, alert, and agreeable to participate in OT evaluation with daughter present. Patient was seen with PT to maximize patient safety, participation, and functional mobility in preparation for self-care tasks. Patient is independent - supervision with basic self-care, supervision with bed mobility and stand by assist with functional transfers using rolling walker in preparation for ADLs. The patient presents with good static standing and fair dynamic standing balance, however will defer to PT for functional balance and functional mobility tasks. Based on the objective data described below, the patient presents with no deficits that impede pt function with ADLs. At this time patient is safe to d/c home with family support from selfcare standpoint. OT to d/c from caseload. Skilled occupational therapy is not indicated at this time.   Discharge Recommendations: Home Health and 24/7 Family supervision  Further Equipment Recommendations for Discharge: shower chair to decrease the risk of falls and rolling walker for support     SUBJECTIVE:   Patient stated Judith Rodarte when asked if she feels better using a walker    OBJECTIVE DATA SUMMARY:     Past Medical History:   Diagnosis Date    Diverticulosis of sigmoid colon 11/13/15    mild;  Ttai Killian    Eczema     Elevated cholesterol     Family history of diabetes mellitus (DM)     Glaucoma     Hyperlipidemia     Hypertension     Indigestion     Interpolated PVCs      Past Surgical History:   Procedure Laterality Date    HX BREAST BIOPSY      HX HYSTERECTOMY  1981    STEPHAN/BSO    HX OOPHORECTOMY      right     Barriers to Learning/Limitations: yes;  altered mental status (intermittent Confusion)  Compensate with: visual, verbal, tactile, kinesthetic cues/model    Home Situation:   Home Situation  Home Environment: Private residence  # Steps to Enter: 3  Rails to Enter: Yes  Hand Rails : Bilateral  One/Two Story Residence: One story  Living Alone: Yes  Support Systems: Friend/Neighbor  Patient Expects to be Discharged to[de-identified] Home  Current DME Used/Available at Home: None  Tub or Shower Type: Tub/Shower combination  [x]     Right hand dominant   []     Left hand dominant    Cognitive/Behavioral Status:  Neurologic State: Alert  Orientation Level: Oriented to person;Oriented to place;Oriented to situation  Cognition: Follows commands  Safety/Judgement: Awareness of environment; Fall prevention    Skin: Intact; dressing around forehead from laceration s/p fall  Edema: None noted    Vision/Perceptual:    Acuity: Within Defined Limits    Corrective Lenses: Glasses    Coordination: BUE     Fine Motor Skills-Upper: Left Intact; Right Intact    Gross Motor Skills-Upper: Left Intact; Right Intact    Balance:  Sitting: Intact  Standing: Impaired; With support  Standing - Static: Good  Standing - Dynamic : Fair    Strength: BUE  Strength: Generally decreased, functional        Tone & Sensation: BUE  Tone: Normal  Sensation: Intact        Range of Motion: BUE  AROM: Within functional limits     Functional Mobility and Transfers for ADLs:  Bed Mobility:  Supine to Sit: Supervision  Sit to Supine: Supervision  Scooting: Supervision    Transfers:  Sit to Stand: Stand-by assistance  Stand to Sit: Stand-by assistance      ADL Assessment:  Feeding: Independent    Oral Facial Hygiene/Grooming: Modified Independent;Supervision (Supervision standing)    Bathing: Modified independent;Supervision    Upper Body Dressing: Independent    Lower Body Dressing: Modified independent;Supervision (Suprvision standing)    Toileting: Modified independent;Supervision      ADL Intervention:  Grooming  Grooming Assistance: Supervision  Position Performed: Standing (at sink)  Washing Hands: Supervision    Upper Body Dressing Assistance  Dressing Assistance: [address] with hospital gown: Independent    Lower Body Dressing Assistance  Dressing Assistance: Modified independent  Socks: Modified independent  Leg Crossed Method Used: Yes  Position Performed: Seated edge of bed    Cognitive Retraining  Safety/Judgement: Awareness of environment; Fall prevention    Pain:  Pain level pre-treatment: 0/10   Pain level post-treatment: 0/10   Pain Intervention(s): Medication (see MAR); Rest, Ice, Repositioning\   Response to intervention: Nurse notified, See doc flow    Activity Tolerance:   Fair+    Please refer to the flowsheet for vital signs taken during this treatment. After treatment:   []  Patient left in no apparent distress sitting up in chair  [x]  Patient left in no apparent distress in bed  [x]  Call bell left within reach  [x]  Nursing notified  [x]  Transport present  []  Bed alarm activated    COMMUNICATION/EDUCATION:   [x]      Role of Occupational Therapy in the acute care setting  [x]      Home safety education was provided and the patient/caregiver indicated understanding. [x]      Patient/family have participated as able and agree with findings and recommendations. []      Patient is unable to participate in plan of care at this time.     Thank you for this referral.  [place] Mins, OTR/L  Time Calculation: 25 mins      Eval Complexity: History: MEDIUM Complexity : Expanded review of history including physical, cognitive and psychosocial  history ; Examination: LOW Complexity : 1-3 performance deficits relating to physical, cognitive , or psychosocial skils that result in activity limitations and / or participation restrictions ;    Decision Making:LOW Complexity : No comorbidities that affect functional and no verbal or physical assistance needed to complete eval tasks

## 2022-03-15 NOTE — H&P
History & Physical    Patient: Araceli Ferguson MRN: [id number]  CSN: [id number]    YOB: 1940  Age: [de-identified] y.o. Sex: female      DOA: 3/14/2022  CC:fall     PCP: Yumiko Van MD       HPI:     Araceli Ferguson is a [de-identified] y.o. female who is a transfer from [facility] ER today for further evaluation of syncope and collapse. Information taken from patient, patient's daughter and EMR. Today daughter heard her mother fall while she was in the kitchen. Found patient lying on her side shaking and unresponsive for up to a minute. Some mild confusion noted after for several minutes and returned back to baseline. Laceration of the left side of head. No evidence of incontinence or tongue biting. No history of seizure. No symptoms leading up to fall. This similar episode happened in February 2022 with symptoms of being tired and body started to shake, eyes remained open with change in mental status which resolved in a few minutes. She had an evaluation with a urgent care center with a reassuring evaluation. History of 2 months of unintentional weight loss. MRI lesion on pancreas and following GI. Currently being treated for fungal infection with azithromycin and mycobutin since February 2022. Adjustments were made to her BP medications, cut in half. Those are the only changes to medical history. In ER right occipital hematoma with approximately 4 cm laceration noted. Staples     ROS  GENERAL: fall, left side of head laceration, decrease appetite, fatigue, weight loss   HEENT: No change in vision, no earache, no tinnitus, no sore throat or sinus congestion. NECK: No pain or stiffness. PULMONARY: No shortness of breath, no cough or wheeze. Cardiovascular: no pnd or orthopnea, no CP  GASTROINTESTINAL: No abdominal pain, no nausea, no vomiting or diarrhea, no melena or bright red blood per rectum. GENITOURINARY: No urinary frequency, no urgency, no hesitancy or dysuria.    MUSCULOSKELETAL: No joint or muscle pain, no back pain, no recent trauma. DERMATOLOGIC: No rash, no itching, no lesions. ENDOCRINE: No polyuria, no polydipsia, no heat or cold intolerance. No recent change in weight. HEMATOLOGICAL: No anemia or easy bruising or bleeding. NEUROLOGIC: headache, weakness     Past Medical History:   Diagnosis Date    Diverticulosis of sigmoid colon 11/13/15    mild; Dr. Jillian Desai Eczema     Elevated cholesterol     Family history of diabetes mellitus (DM)     Glaucoma     Hyperlipidemia     Hypertension     Indigestion     Interpolated PVCs        Past Surgical History:   Procedure Laterality Date    HX BREAST BIOPSY      HX HYSTERECTOMY  1981    STEPHAN/BSO    HX OOPHORECTOMY      right       Family History   Problem Relation Age of Onset   Arce Glaucoma Mother     Other Father         cerebral aneurysm    Hypertension Sister     Diabetes Sister     Hypertension Sister     Diabetes Paternal Uncle        Social History     Socioeconomic History    Marital status:    Tobacco Use    Smoking status: Never Smoker    Smokeless tobacco: Never Used   Substance and Sexual Activity    Alcohol use: No     Comment: with dinner a few days per week    Drug use: No    Sexual activity: Not Currently       Prior to Admission medications    Medication Sig Start Date End Date Taking? Authorizing Provider   levETIRAcetam (Keppra) 250 mg tablet Take 1 Tablet by mouth two (2) times a day. 3/14/22  Yes Santana Morelos MD   potassium chloride SR (K-TAB) 20 mEq tablet Take 2 Tablets by mouth daily. 3/14/22  Yes Sparkle Combs MD   rifabutin [facility]) 150 mg capsule rifabutin 150 mg capsule   Take 1 capsule every day by oral route. Other, MD Jose Manuel   azithromycin (ZITHROMAX) 250 mg tablet Take 250 mg by mouth daily. Provider, Historical   aspirin delayed-release 81 mg tablet Take 1 Tablet by mouth daily. Patient taking differently: Take 40.5 mg by mouth daily.  1/28/22   Huy Carlos, NP   neomycin-polymyxin-dexamethasone (DEXACINE) 3.5 mg/g-10,000 unit/g-0.1 % ophthalmic ointment  12/14/21   Provider, Historical   psyllium husk (DAILY FIBER PO) Take  by mouth. Provider, Historical   omeprazole (PRILOSEC) 20 mg capsule Take 20 mg by mouth daily. Provider, Historical   cyanocobalamin (Vitamin B-12) 1,000 mcg tablet Take 1,000 mcg by mouth daily. Provider, Historical   folic acid/multivit-min/lutein (CENTRUM SILVER PO) Take  by mouth. Provider, Historical   bimatoprost (LUMIGAN) 0.03 % ophthalmic drops Administer 1 Drop to both eyes every evening. Provider, Historical   DORZOLAMIDE HCL/TIMOLOL MALEAT (DORZOLAMIDE-TIMOLOL OP) Apply  to eye. Provider, Historical   CALCIUM CARBONATE/VITAMIN D3 (CALCIUM WITH VITAMIN D PO) Take  by mouth. Provider, Historical       Allergies   Allergen Reactions    Pravastatin Other (comments)     Arthralgias/myalgias              Physical Exam:      Visit Vitals  BP (!) 163/69   Pulse (!) 57   Temp 98.1 °F (36.7 °C)   Resp 16   Ht 5' (1.524 m)   Wt 44.9 kg (99 lb)   SpO2 99%   BMI 19.33 kg/m²       Physical Exam:  General:         Alert, cooperative, no acute distress    HEENT:           bandage to head,   Lungs:            CTA bilaterally. No Wheezing/Rhonchi/Rales  Heart:              SB, No murmur, No Rubs, No Gallops  Abdomen:      Soft, Non distended, Non tender. +Bowel sounds, no HSM  Extremities:   No c/c/e  Psych:              Good insight. Not anxious or agitated. Neurologic:     Alert and oriented X 4.  Flat affect      Lab/Data Review:  Labs: Results:       Chemistry Recent Labs     03/14/22  1120   *   *   K 3.4*   CL 95*   CO2 33*   BUN 16   CREA 0.78   CA 8.3*   AGAP 6   BUCR 21*   AP 61   TP 6.8   ALB 3.2*   GLOB 3.6   AGRAT 0.9      CBC w/Diff Recent Labs     03/14/22  1120   WBC 5.4   RBC 4.05*   HGB 12.5   HCT 35.6      GRANS 73   LYMPH 17*   EOS 1      Coagulation Recent Labs     03/14/22  1120   PTP 12.8 INR 1.0   APTT 28.8       Iron/Ferritin No results for input(s): IRON in the last 72 hours. No lab exists for component: TIBCCALC   BNP No results for input(s): BNPP in the last 72 hours. Cardiac Enzymes No results for input(s): CPK, CKND1, JERZY in the last 72 hours. No lab exists for component: CKRMB, TROIP   Liver Enzymes Recent Labs     03/14/22  1120   TP 6.8   ALB 3.2*   AP 61      Thyroid Studies Lab Results   Component Value Date/Time    TSH 3.26 03/14/2022 11:20 AM          All Micro Results     None          Imaging Reviewed:  XR Results (most recent):  Results from Hospital Encounter encounter on 03/14/22    XR CHEST SNGL V    Narrative  PORTABLE CHEST X-RAY    CPT CODE: 76837    INDICATION: Syncope. COMPARISON: Plain film 2/19/2021. FINDINGS:  Heart size normal. Aortic arch not enlarged. Small amount of calcification in  the arch  No pneumothorax appreciated. No overt pulmonary edema. No focal infiltrate or consolidation. No significant effusion on portable AP  image. Thoracolumbar curvature convex to the right, similar. Impression  1. No evidence of acute cardiopulmonary disease. CT Results (most recent):  Results from Hospital Encounter encounter on 03/14/22    CT SPINE CERV WO CONT    Narrative  EXAM: CT SPINE CERV WO CONT    INDICATION: closed head injury, laceration, transient confusion and altered  mental status, fall    COMPARISON: None. TECHNIQUE: Unenhanced multislice helical CT of the cervical spine was performed  in the axial plane and sagittal and coronal reformations were generated. CT  dose reduction was achieved through use of a standardized protocol tailored for  this examination and automatic exposure control for dose modulation. FINDINGS:    The alignment of the cervical spine is normal.    Vertebral body heights are maintained. No evidence for fracture or subluxation. Intact odontoid process.  Atlantodental mild degenerative changes and  mineralization along the transverse ligament, might be due to CPPD. Multilevel disc height loss and endplate osteophytosis mostly at C3-4, C5-6 and  C6-7. Multilevel associated uncovertebral spurring on high-grade foraminal  stenosis at right C3-4, C5-6 and C6-7. . Mild spinal stenosis at C3-4, C5-6 and  C6-7. The prevertebral soft tissues are normal.    The visualized portions of the lung apices are clear. Impression  1. No evidence of acute fracture or subluxation. 2. Multilevel advanced degenerative disc disease. Mild spinal canal stenosis and  right-sided high-grade foraminal stenosis as discussed. Head CT without contrast     HISTORY: Closed head injury, transient altered mental status, transient  confusion  COMPARISON: None     Technique: CT images of the head were obtained. All CT scans at this facility  are performed using dose optimization technique as appropriate to the performed  exam, to include automated exposure control, adjustment of the mA and/or kV  according to patient's size (Including appropriate matching for site-specific  examinations), or use of iterative reconstruction technique.     FINDINGS:   No intracranial hemorrhage, extra-axial fluid collection or mass effect. No  shift of midline structures. No evidence for acute ischemia. Mild  age-appropriate volume loss. Mild periventricular white matter hypoattenuation,  nonspecific and likely sequelae of chronic microvascular disease. Intact osseous structures. The visualized paranasal air sinuses are aerated. Several surgical clips inferior to the right auricle. Right posterior scapula  soft tissue swelling/small hematoma.     IMPRESSION     1. Right posterior scalp soft tissue contusion/hematoma. 2. No evidence of acute intracranial abnormality.   3. Mild burden of periventricular white matter presumed chronic microvascular  disease.               Assessment:   Active Problems:    Syncope and collapse (3/14/2022)      Hypokalemia (3/14/2022)    Bradycardia   Right posterior scalp soft tissue contusion/hematoma     Consults  Cardiology   GI  ID  Nutrition   PT/OT        Plan:   1. Awaiting for full consults tomorrow for further updates on plan of care  2. Continue PO antibiotics atypical mycobacterial infection. 3. Monitor ortho static BP. Encouraged to use the call light when she needs to ambulate  4. Echo ordered   5. Full code  6. Daughter Yetejal at bedside and updated on plan of care.              Ronnie Worthington NP  3/14/2022, 8:57 PM

## 2022-03-15 NOTE — CONSULTS
Neurology Consultation Note     Consult requested on 03/15/2022 by Cristopher Brown MD  Consult performed on 03/15/2022 by Ary Gonzalez MD, PhD, Jeana Frankel is a [de-identified] y.o. W w/PMHx of HPL, T2D, HTN who p/w syncopal episode. Her daughter heard her fall and found her on the floor shaking and unresponsive for one minute. The patient reports no LOC. She suffered a laceration of the right side of the head. To note, she is being treated with azithromycin and mycobutin for a fungal infection. Further, she was hypotensive in the past and her BP meds were being adjusted. O - Vitals signs and nursing notes reviewed. Medications reviewed. Imaging reviewed - CTH no acute intracranial process, right scalp hematoma. Labs reviewed. Neurological examination - lying in bed, not in acute distress. A&Ox4. PERRLA, EOMI, visual fields intact to confrontation, no significant nystagmus, no ptosis, face sensation intact and symmetric without droop, hearing intact, no dysarthria, shoulder shrug intact, tongue midline. Moving all extremities equally and symmetrically. Some fasciculations of the distal limbs. No other involuntary movements noted. Muscle tone and bulk is normal and symmetric. Muscle strength 5/5 throughout. Sensation intact to LT intact. Some pseudoathetosis of the BUE. Few beats clonus of the BLE. Some postural dizziness with rapid raise from lying to seated, dissipated with stabilization at each posture. A - [de-identified] y.o. W w/HPL, HTN, T2D, p/w recurrent syncopal events. The neurological examination is reassuring. The patient's history and examination are c/w syncopal events, underlying distal symmetric peripheral polyneuropathy, orthostatism. P - Orthostatism care, compression stockings, hydration, exercises, PT/OT eval.     Thank you for the consult. Raul Gonzalez MD, PhD, [facility] Neurologist, [facility]

## 2022-03-15 NOTE — PROGRESS NOTES
Hospitalist Progress Note    Patient: Luis Rosa Age: [de-identified] y.o. : 1940 MR#: [id number] SSN: [id number]  Date/Time: 3/15/2022 8:59 AM    DOA: 3/14/2022  PCP: Michelle Su MD    Subjective:     Her daughter was at bedside to provide what has happened. Apparently, patient was witnessed to be shaking while she was on the floor, patient was not confuse or has incontinence at that time. Apparently, this has happened in a few week ago. She sustained a scalp laceration this time. NOTE: she was recently started on timolol eye drop for her left eye complaint. She was recently noted to have low SBP as well, her lisinopril/HCTZ has been stopped. She has h/o chronic bradycardia but no evidence of pause. She was recently on Azithromycin+Rifabutin. EKG on admission did not show prolonged QTc    NOTE: keppra was prescribed from this ER but she has not been on Keppra. No clear evidence of seizure or report of seizure disorder     Telemetry has bradycardia but no pause. Orthostatic BP noted on measure this AM.   Carotid duplex with L ICA 50-69% stenosis, LAURENT <50% stenosis  Echo is without valvular disease             ROS:  No current fever/chills, no headache, no dizziness, no facial pain, no sinus congestion,   No swallowing pain, No chest pain, no palpitation, no shortness of breath, no abd pain,  No diarrhea, no urinary complaint, no leg pain or swelling      Assessment/Plan:   1. Syncope with collapse, likely multifactorial, she has chronic bradycardia that might have been worsened by her recently prescribed/use of Timolol eyedrops, plus her orthostatic BP in the setting of left ICA stenosis. 2.  Jerking/shaking observed by daughter at home while she was down, this could possibly be convulsive syncope. Doubt that this was seizure disorder. 3.  Chronic bradycardia, no pause on telemetry   4. Left ICA stenosis, 50-69%,  Right ICA stenosis <50%   5.   Right posterior scalp soft tissue laceration, s/p repair   6. Hypertension   7. Hypokalemia, hyponatremia,  8. Mycobacterium avium infection, on chronic Azithromycin and Rifabutin   9. Pancreatic lesion with interval size change   10.  unintentional weight loss, 8 lbs   11. Normocytic anemia   12. Leukopenia       Spoke with Dr Jonh Murillo for vascular evaluation for syncope and L ICA stenosis. Neurology evaluation follow up, thought that she had seizure, MRI brain with contrast if she truly has first seizure. Spoke with Cardiology for her continue bradycardia care.  Advised her to stop timolol use   Cont telemetry monitor  ID follow up, currently, no clear QTc pathology with her current Azithromycin use   GI follow up for her pancreatic mass, this was suppose to be scheduled in the clinic today   Check iron profile and vit b12  Resume her home medications as tolerated    Full code  Called and spoke with Gómez (ER MD), [phone]      Disposition planning: home tomorrow if no event in telemetry, MRI brain  Family updated (.daughter updated at bedside.)  Additional Notes:    Time spent >35 minutes    Case discussed with:  [x]Patient  [x]Family  [x]Nursing  [x]Case Management  DVT Prophylaxis:  [x]Lovenox  []Hep SQ  []SCDs  []Coumadin   []On Heparin gtt    Signed By: Micheal Bruce MD     March 15, 2022 8:59 AM              Objective:   VS:   Visit Vitals  BP (!) 159/78 (BP Patient Position: At rest;Standing)   Pulse (!) 58   Temp 98.1 °F (36.7 °C)   Resp 18   Ht 5' (1.524 m)   Wt 43.9 kg (96 lb 12.8 oz)   SpO2 98%   Breastfeeding No   BMI 18.90 kg/m²      Tmax/24hrs: Temp (24hrs), Av °F (36.7 °C), Min:97.6 °F (36.4 °C), Max:98.3 °F (36.8 °C)      Intake/Output Summary (Last 24 hours) at 3/15/2022 0859  Last data filed at 3/15/2022 0742  Gross per 24 hour   Intake 940 ml   Output 1300 ml   Net -360 ml       Tele: sinus bradycardia   General:  Cooperative, Not in acute distress, speaks in full sentence while in bed  HEENT: PERRL, EOMI, supple neck, no JVD, dry oral mucosa  Cardiovascular: S1S2 regular, no rub/gallop   Pulmonary: air entry bilaterally, no wheezing, no crackle  GI:  Soft, non tender, non distended, +bs, no guarding   Extremities:  No pedal edema, +distal pulses appreciated   Neuro: AOx3, moving all extremities, no gross deficit. Additional:       Current Facility-Administered Medications   Medication Dose Route Frequency    sodium chloride (NS) flush 5-40 mL  5-40 mL IntraVENous Q8H    sodium chloride (NS) flush 5-40 mL  5-40 mL IntraVENous PRN    acetaminophen (TYLENOL) tablet 650 mg  650 mg Oral Q6H PRN    Or    acetaminophen (TYLENOL) suppository 650 mg  650 mg Rectal Q6H PRN    polyethylene glycol (MIRALAX) packet 17 g  17 g Oral DAILY PRN    ondansetron (ZOFRAN ODT) tablet 4 mg  4 mg Oral Q8H PRN    Or    ondansetron (ZOFRAN) injection 4 mg  4 mg IntraVENous Q6H PRN    enoxaparin (LOVENOX) injection 40 mg  40 mg SubCUTAneous DAILY    famotidine (PEPCID) tablet 20 mg  20 mg Oral BID    azithromycin (ZITHROMAX) tablet 250 mg  250 mg Oral DAILY    rifAMPin (RIFADIN) capsule 150 mg  150 mg Oral ACB            Lab/Data Review:  Labs: Results:       Chemistry Recent Labs     03/15/22  0354 03/14/22  1120   * 160*   * 134*   K 3.1* 3.4*   CL 98* 95*   CO2 29 33*   BUN 17 16   CREA 0.64 0.78   BUCR 27* 21*   AGAP 5 6   CA 8.2* 8.3*     Recent Labs     03/14/22  1120   ALT 36   TP 6.8   ALB 3.2*   GLOB 3.6   AGRAT 0.9      CBC w/Diff Recent Labs     03/15/22  0354 03/14/22  1120 03/14/22  1120   WBC 3.4*  --  5.4   RBC 3.63*  --  4.05*   HGB 10.9*  --  12.5   HCT 31.6*  --  35.6   MCV 87.1   < > 87.9   MCH 30.0   < > 30.9   MCHC 34.5   < > 35.1   RDW 12.5   < > 12.5     --  157   GRANS 50  --  73   LYMPH 35  --  17*   EOS 1  --  1    < > = values in this interval not displayed.       Coagulation Recent Labs     03/14/22  1120   PTP 12.8   INR 1.0   APTT 28.8       Iron/Ferritin No results found for: IRON, FE, TIBC, IBCT, PSAT, FERR    BNP    Cardiac Enzymes No results found for: CPK, RCK1, RCK2, RCK3, RCK4, CKMB, CKNDX, CKND1, TROPT, TROIQ, BNPP, BNP     Lactic Acid    Thyroid Studies          All Micro Results     None            Images:    CT (Most Recent). CT Results (most recent):  Results from Hospital Encounter encounter on 03/14/22    CT SPINE CERV WO CONT    Narrative  EXAM: CT SPINE CERV WO CONT    INDICATION: closed head injury, laceration, transient confusion and altered  mental status, fall    COMPARISON: None. TECHNIQUE: Unenhanced multislice helical CT of the cervical spine was performed  in the axial plane and sagittal and coronal reformations were generated. CT  dose reduction was achieved through use of a standardized protocol tailored for  this examination and automatic exposure control for dose modulation. FINDINGS:    The alignment of the cervical spine is normal.    Vertebral body heights are maintained. No evidence for fracture or subluxation. Intact odontoid process. Atlantodental mild degenerative changes and  mineralization along the transverse ligament, might be due to CPPD. Multilevel disc height loss and endplate osteophytosis mostly at C3-4, C5-6 and  C6-7. Multilevel associated uncovertebral spurring on high-grade foraminal  stenosis at right C3-4, C5-6 and C6-7. . Mild spinal stenosis at C3-4, C5-6 and  C6-7. The prevertebral soft tissues are normal.    The visualized portions of the lung apices are clear. Impression  1. No evidence of acute fracture or subluxation. 2. Multilevel advanced degenerative disc disease. Mild spinal canal stenosis and  right-sided high-grade foraminal stenosis as discussed. XRAY (Most Recent)      EKG Results for orders placed or performed in visit on 05/23/12   AMB POC EKG ROUTINE W/ 12 LEADS, INTER & REP     Status: Abnormal    Impression    #1. Sinus bradycardia at 41 beats per minute.   EKG is otherwise normal        2D ECHO 03/14/22    ECHO ADULT FOLLOW-UP OR LIMITED 03/15/2022 3/15/2022    Interpretation Summary    Left Ventricle: Left ventricle size is normal. Mildly increased wall thickness. Normal wall motion. Normal left ventricular systolic function with a visually estimated EF of 55 - 60%.   Aortic Valve: Mild transvalvular regurgitation. Signed by: Gerber Aragon MD on 3/15/2022  1:13 PM           Duplex of carotid:  Interpretation Summary    · Mild mixed density plaque at the bifurcation and internal carotid artery on the right, with less than 50% stenosis. · Moderate mixed density plaque at the bifurcation and internal carotid artery on the left, with 50-69% stenosis(note stenosis may be on the higher end of percentage). · No evidence of significant stenosis in the external carotid arteries. · Antegrade vertebral arteries bilaterally. · Normal velocities in the the subclavian arteries bilaterally.

## 2022-03-15 NOTE — CONSULTS
[email or web address]  [phone]    GASTROENTEROLOGY CONSULT      Impression:   1. Pancreatic lesion with interval size change - MRI noted below  2. Weight loss - 8 to 10 lbs over past 2 months  3. Syncope and collapse - ? Seizure disorder  4. Bradycardia  5. Hypokalemia  6. 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 Anemia - mild, will trend  8. Right posterior scalp soft tissue contusion/hematoma    MRI/MRCP 2/10/2022    IMPRESSION     1. The pancreatic head cystic lesion continues to grow. Branch duct IPMN is  most likely. While there is slight rim enhancement, there are no internal solid  suspicious components to indicate malignant transformation however the diffusion  restriction in the enhancing wall does raise concern. Close follow-up advised. 2. Small left renal cyst.  3. Small hiatal hernia. 4. Scoliosis.         Plan:     1. Conservative management needed relating to pancreatic cystic lesion, slight enlargement without overt malignant features on MRI as noted above. Could consider EUS with FNA but she is ultimately likely not a good candidate for pancreatectomy due to age and current comorbidities. Pancreatectomy would be the treatment for enlarging pancreatic lesion of this type. 2. Appreciate input from Nutrition concerning her recent weight loss, no overt findings on abdominal MRI contributing to this. 3. Appreciate input from ID and cardiology  4. Medical management per primary team      Chief Complaint: Pancreatic lesion      HPI:  Colten Mercado is a [de-identified] y.o. female who I am being asked to see in consultation for an opinion regarding the above. She presented to  ED as a transfer from [facility] where she had experience episode of syncope and collapse with subsequent head laceration. She had experienced one prior episode without definitive cause. She has a history of mycobacterium avium infection which has been treated with azithromycin and mycobutin for the past month.  She reports a two month history of anorexia and unintentional weight loss, roughly 8 to 10 lbs. She denies dysphagia, reflux, abdominal pain, change in bowel habits or blood in the stool. Daughter reports she was to be seen at [facility] today due to the pancreatic lesion. She notes patient has declined over past 2 to 3 months, was very active. PMH:   Past Medical History:   Diagnosis Date    Diverticulosis of sigmoid colon 11/13/15    mild; Dr. Jose Chamorro Eczema     Elevated cholesterol     Family history of diabetes mellitus (DM)     Glaucoma     Hyperlipidemia     Hypertension     Indigestion     Interpolated PVCs        PSH:   Past Surgical History:   Procedure Laterality Date    HX BREAST BIOPSY      HX HYSTERECTOMY  1981    STEPHAN/BSO    HX OOPHORECTOMY      right       Social HX:   Social History     Socioeconomic History    Marital status:      Spouse name: Not on file    Number of children: Not on file    Years of education: Not on file    Highest education level: Not on file   Occupational History    Not on file   Tobacco Use    Smoking status: Never Smoker    Smokeless tobacco: Never Used   Substance and Sexual Activity    Alcohol use: No     Comment: with dinner a few days per week    Drug use: No    Sexual activity: Not Currently   Other Topics Concern    Not on file   Social History Narrative    Not on file     Social Determinants of Health     Financial Resource Strain:     Difficulty of Paying Living Expenses: Not on file   Food Insecurity:     Worried About Running Out of Food in the Last Year: Not on file    Dea of Food in the Last Year: Not on file   Transportation Needs:     Lack of Transportation (Medical): Not on file    Lack of Transportation (Non-Medical):  Not on file   Physical Activity:     Days of Exercise per Week: Not on file    Minutes of Exercise per Session: Not on file   Stress:     Feeling of Stress : Not on file   Social Connections:     Frequency of Communication with Friends and Family: Not on file    Frequency of Social Gatherings with Friends and Family: Not on file    Attends Jainism Services: Not on file    Active Member of Clubs or Organizations: Not on file    Attends Club or Organization Meetings: Not on file    Marital Status: Not on file   Intimate Partner Violence:     Fear of Current or Ex-Partner: Not on file    Emotionally Abused: Not on file    Physically Abused: Not on file    Sexually Abused: Not on file   Housing Stability:     Unable to Pay for Housing in the Last Year: Not on file    Number of [place] in the Last Year: Not on file    Unstable Housing in the Last Year: Not on file       FHX:   Family History   Problem Relation Age of Onset    Glaucoma Mother     Other Father         cerebral aneurysm    Hypertension Sister     Diabetes Sister     Hypertension Sister     Diabetes Paternal Uncle        Allergy:   Allergies   Allergen Reactions    Pravastatin Other (comments)     Arthralgias/myalgias       Patient Active Problem List   Diagnosis Code    Eczema L30.9    Glaucoma H40.9    Generalized osteoarthrosis, involving multiple sites M15.9    Essential hypertension I10    Hyperlipidemia LDL goal <130 E78.5    Raynaud's disease without gangrene I73.00    Impaired fasting glucose R73.01    Nonrheumatic aortic valve insufficiency I35.1    Acid-fast bacteria present R89.9    Chronic constipation K59.09    Syncope and collapse R55    Hypokalemia E87.6       Home Medications:     Medications Prior to Admission   Medication Sig    rifabutin (MYCOBUTIN) 150 mg capsule rifabutin 150 mg capsule   Take 1 capsule every day by oral route.  azithromycin (ZITHROMAX) 250 mg tablet Take 250 mg by mouth daily.  aspirin delayed-release 81 mg tablet Take 1 Tablet by mouth daily.  (Patient taking differently: Take 40.5 mg by mouth daily.)    neomycin-polymyxin-dexamethasone (DEXACINE) 3.5 mg/g-10,000 unit/g-0.1 % ophthalmic ointment     psyllium husk (DAILY FIBER PO) Take  by mouth.  omeprazole (PRILOSEC) 20 mg capsule Take 20 mg by mouth daily.  cyanocobalamin (Vitamin B-12) 1,000 mcg tablet Take 1,000 mcg by mouth daily.  folic acid/multivit-min/lutein (CENTRUM SILVER PO) Take  by mouth.  bimatoprost (LUMIGAN) 0.03 % ophthalmic drops Administer 1 Drop to both eyes every evening.  DORZOLAMIDE HCL/TIMOLOL MALEAT (DORZOLAMIDE-TIMOLOL OP) Apply  to eye.  CALCIUM CARBONATE/VITAMIN D3 (CALCIUM WITH VITAMIN D PO) Take  by mouth. Review of Systems:     Constitutional: weight loss, fatigue. Skin: No rashes, pruritis, jaundice, ulcerations, erythema. HENT: No headaches, nosebleeds, sinus pressure, rhinorrhea, sore throat. Eyes: No visual changes, blurred vision, eye pain, photophobia, jaundice. Cardiovascular: No chest pain, heart palpitations. Respiratory: No cough, SOB, wheezing, chest discomfort, orthopnea. Gastrointestinal: Neg unless noted otherwise in H&P   Genitourinary: No dysuria, bleeding, discharge, pyuria. Musculoskeletal: No weakness, arthralgias, wasting. Endo: No sweats. Heme: No bruising, easy bleeding. Allergies: As noted. Neurological: Cranial nerves intact. Alert and oriented. Gait not assessed. Psychiatric:  No anxiety, depression, hallucinations. Visit Vitals  BP (!) 159/78   Pulse (!) 58   Temp 98.1 °F (36.7 °C)   Resp 18   Ht 5' (1.524 m)   Wt 43.5 kg (96 lb)   SpO2 98%   Breastfeeding No   BMI 18.75 kg/m²       Physical Assessment:     constitutional: appearance: thin, small build, in no acute distress. skin: inspection: right posterior head laceration  eyes: inspection: normal conjunctivae and lids; no jaundice pupils: normal  ENMT: mouth: normal oral mucosa,lips and gums; good dentition. oropharynx: normal tongue, hard and soft palate; posterior pharynx without erithema, exudate or lesions.    neck: thyroid: normal size, consistency and position; no masses or tenderness. respiratory: effort: normal chest excursion; no intercostal retraction or accessory muscle use. cardiovascular: abdominal aorta: normal size and position; no bruits. palpation: PMI of normal size and position; bradycardia; no thrill or murmurs. abdominal: abdomen: normal consistency; no tenderness or masses. hernias: no hernias appreciated. liver: normal size and consistency. spleen: not palpable. rectal: hemoccult/guaiac: not performed. musculoskeletal: digits and nails: no clubbing, cyanosis, petechiae or other inflammatory conditions. head and neck: normal range of motion; no pain, crepitation or contracture. spine/ribs/pelvis: normal range of motion; no pain, deformity or contracture. neurologic: cranial nerves: II-XII grossly normal. Pupils intact. psychiatric: judgement/insight: within normal limits. memory: within normal limits for recent and remote events. mood and affect: no evidence of depression, anxiety or agitation. orientation: oriented to time, space and person. Basic Metabolic Profile   Recent Labs     03/15/22  0354 03/14/22  1120 03/14/22  1120   *   < > 134*   K 3.1*   < > 3.4*   CL 98*   < > 95*   CO2 29   < > 33*   BUN 17   < > 16   *   < > 160*   CA 8.2*   < > 8.3*   MG  --   --  1.9    < > = values in this interval not displayed. CBC w/Diff    Recent Labs     03/15/22  0354   WBC 3.4*   RBC 3.63*   HGB 10.9*   HCT 31.6*   MCV 87.1   MCH 30.0   MCHC 34.5   RDW 12.5       Recent Labs     03/15/22  0354   GRANS 50   LYMPH 35   EOS 1        Hepatic Function   Recent Labs     03/14/22  1120   ALB 3.2*   TP 6.8   TBILI 0.2   AP 61        Coags   Recent Labs     03/14/22  1120   PTP 12.8   INR 1.0   APTT 28.8           LICHA Helms. [facility] [address]  Cell: [phone]  [email or web address]

## 2022-03-15 NOTE — ROUTINE PROCESS
Bedside and Verbal shift change report given to [employer] RN (oncoming nurse) by Marilin Workman RN (offgoing nurse). Report included the following information SBAR, Kardex, MAR, Recent Results and Cardiac Rhythm SB. Patient assisted to BR, return to bed, call light in reach and bed alarm on.

## 2022-03-15 NOTE — PROGRESS NOTES
conducted an initial consultation and Spiritual Assessment for Jana Tucker, who is a [de-identified] y.o.,female. Patients Primary Language is: [place]. According to the patients EMR Lutheran Affiliation is: Orthodox. The reason the Patient came to the hospital is:   Patient Active Problem List    Diagnosis Date Noted    Syncope and collapse 03/14/2022    Hypokalemia 03/14/2022    Acid-fast bacteria present 06/30/2021    Chronic constipation 06/30/2021    Nonrheumatic aortic valve insufficiency 06/16/2021    Impaired fasting glucose 02/21/2018    Raynaud's disease without gangrene 05/15/2017    Hyperlipidemia LDL goal <130 03/23/2016    Essential hypertension 04/20/2015    Generalized osteoarthrosis, involving multiple sites 05/23/2012    Eczema     Glaucoma         The  provided the following Interventions:  Initiated a relationship of care and support. Explored issues of [place], belief, spirituality and Druze/ritual needs while hospitalized. Listened empathically. Provided information about Spiritual Care Services. Offered prayer and assurance of continued prayers on patient's behalf. Chart reviewed. The following outcomes where achieved:  Patient shared limited information about both their medical narrative and spiritual journey/beliefs. Patient processed feeling about current hospitalization. Patient expressed gratitude for 's visit. Assessment:  Patient does not have any Druze/cultural needs that will affect patients preferences in health care. There are no spiritual or Druze issues which require intervention at this time. Plan:  Chaplains will continue to follow and will provide pastoral care on an as needed/requested basis.  recommends bedside caregivers page  on duty if patient shows signs of acute spiritual or emotional distress.       [facility]   [phone]

## 2022-03-15 NOTE — PROGRESS NOTES
MRI Screening form needs to be filled out and faxed to [address] MRI can be scheduled. If unable to obtain information from pt, [facility] needs to be contacted.  If pt is claustro or will need pain meds, please have ordered in advance in order to facilitate exam.

## 2022-03-15 NOTE — PROGRESS NOTES
H&P and orders performed by Nurse Practitioner have been reviewed and Patient has been independently interviewed and examined. Attestation by Physician is forthcoming.

## 2022-03-15 NOTE — CONSULTS
Comprehensive Nutrition Assessment    Type and Reason for Visit: Initial,Consult    Nutrition Recommendations/Plan:   - Add supplement: Ensure Enlive BID  - Continue all other nutrition interventions. Encourage/ monitor po intake of meals and supplements. Nutrition Assessment:  Pt unavailable at time of visit. Consult received due to pt with unplanned wt loss; per chart hx, noted wt loss starting from 2019. Noted fair po intake x1 meal per chart documentation. Will continue to monitor and plan to add supplement. GI following; pt has pancreatic lesion; pt s/p MRI/MRCP on 2/10/2022. Malnutrition Assessment:  Malnutrition Status: At risk for malnutrition (specify) (wt loss PTA per chart hx and chart review)      Nutrition History and Allergies: Past medical hx:  Diverticulosis of sigmoid colon, HLD, HTN, indigestion, elevated cholesterol. Wt trends:   Pt was in 120- 129 lb range in 2011- 2019,   111 lb on 3/29/2021,    104 lb on 6/30/2021. Wt loss of 8 lb, 7.7% x 8-9 month PTA; with net wt loss of 15 lb, 13.5% x 1 year PTA per chart hx. No known food allergies     Estimated Daily Nutrient Needs:  Energy (kcal): 0935-8238; Weight Used for Energy Requirements: Current (43.5 kg)  Protein (g): 44-61; Weight Used for Protein Requirements: Current (x1-1.4)  Fluid (ml/day): 0674-6286; Method Used for Fluid Requirements: 1 ml/kcal      Nutrition Related Findings:  BM 3/15. No edema. Wounds:    None       Current Nutrition Therapies:  ADULT DIET Regular;  Low Sodium (2 gm)    Anthropometric Measures:  · Height:  5' (152.4 cm)  · Current Body Wt:  43.5 kg (95 lb 14.4 oz)   · Admission Body Wt:  98 lb 15.8 oz (pt stated)    · Usual Body Wt:  47.2 kg (104 lb) (6/30/2021 per chart hx)     · Ideal Body Wt:  100 lbs:  95.9 %   · Adjusted Body Weight:   ; Weight Adjustment for: No adjustment   · BMI Category:  Underweight (BMI less than 22) age over 72       Nutrition Diagnosis:   · Unintended weight loss related to inadequate protein-energy intake (predicted/ prolonged) as evidenced by weight loss (net wt loss of 13.5% x 1 year PTA)      Nutrition Interventions:   Food and/or Nutrient Delivery: Continue current diet,Start oral nutrition supplement  Nutrition Education and Counseling: No recommendations at this time,Education not indicated  Coordination of Nutrition Care: Continue to monitor while inpatient    Goals:  PO nutrition intake will meet >75% of patient's estimated nutrition needs within the next follow up date       Nutrition Monitoring and Evaluation:   Behavioral-Environmental Outcomes: None identified  Food/Nutrient Intake Outcomes: Food and nutrient intake,Supplement intake  Physical Signs/Symptoms Outcomes: Biochemical data,Meal time behavior    Discharge Planning:    Continue oral nutrition supplement,Continue current diet     Electronically signed by Efra Coulter RD on 3/15/2022 at 3:39 PM    Contact: [phone]

## 2022-03-15 NOTE — PROGRESS NOTES
Problem: Mobility Impaired (Adult and Pediatric)  Goal: *Acute Goals and Plan of Care (Insert Text)  Description: Physical Therapy Goals  Initiated 3/15/2022 and to be accomplished within 7 day(s)  1. Patient will move from supine to sit and sit to supine , scoot up and down, and roll side to side in bed with modified independence. 2.  Patient will transfer from bed to chair and chair to bed with modified independence using the least restrictive device. 3.  Patient will perform sit to stand with modified independence. 4.  Patient will ambulate with modified independence for 100 feet with the least restrictive device. 5.  Patient will ascend/descend 4 stairs with B handrail(s) with modified independence. PLOF: Pt independent without AD, lives alone in 2 story house with bedroom and full bath on first floor with 4 JENNIFER with B handrails. Daughter lives in [address] [facility] but comes to visit her ~1x/month. Outcome: Progressing Towards Goal     PHYSICAL THERAPY EVALUATION    Patient: Geena Price [de-identified] y.o. female)  Date: 3/15/2022  Primary Diagnosis: Syncope and collapse [R55]  Hypokalemia [E87.6]        Precautions:  Fall,Seizure    ASSESSMENT :  Pt cleared to participate in PT session, pt received semi-reclined in bed and agreeable to therapy session. Completing with OT to maximize safety and mobility. Based on the objective data described below, the patient presents with decreased endurance, decreased strength, decreased balance reactions, gait deviations, and decreased independence in functional mobility. Pt completing bed mobility with supervision. Standing with SBA to RW. Ambulating x30 feet in room with RW and SBA. Pt able to stand with 0 UE support to wash hands at sink. Pt returned to sitting in recliner, transport arriving and pt returned to bed with SBA and RW. Pt positioned for comfort and educated to call for assist before getting up, pt verbalized understanding.  Pt left with all needs met and call bell in reach. RN notified of position and participation. Pt lives alone, at this time recommending RW and HH with 24/7 supervision for safety from family/friends    Patient will benefit from skilled intervention to address the above impairments. Patient's rehabilitation potential is considered to be Fair  Factors which may influence rehabilitation potential include:   []         None noted  []         Mental ability/status  []         Medical condition  [x]         Home/family situation and support systems  []         Safety awareness  []         Pain tolerance/management  []         Other:      PLAN :  Recommendations and Planned Interventions:   [x]           Bed Mobility Training             []    Neuromuscular Re-Education  [x]           Transfer Training                   []    Orthotic/Prosthetic Training  [x]           Gait Training                          []    Modalities  [x]           Therapeutic Exercises           []    Edema Management/Control  [x]           Therapeutic Activities            [x]    Family Training/Education  [x]           Patient Education  []           Other (comment):    Frequency/Duration: Patient will be followed by physical therapy 1-2 times per day/4-7 days per week to address goals. Discharge Recommendations: Home Health with 24/7 supervision   Further Equipment Recommendations for Discharge: rolling walker     SUBJECTIVE:   Patient stated I am from [facility].     OBJECTIVE DATA SUMMARY:     Past Medical History:   Diagnosis Date    Diverticulosis of sigmoid colon 11/13/15    mild; Dr. Blaze Rosales     Elevated cholesterol     Family history of diabetes mellitus (DM)     Glaucoma     Hyperlipidemia     Hypertension     Indigestion     Interpolated PVCs      Past Surgical History:   Procedure Laterality Date    HX BREAST BIOPSY      HX HYSTERECTOMY  1981    STEPHAN/BSO    HX OOPHORECTOMY      right     Barriers to Learning/Limitations: None  Compensate with: N/A  Home Situation:  Home Situation  Home Environment: Private residence  # Steps to Enter: 3  Rails to Enter: Yes  Hand Rails : Bilateral  One/Two Story Residence: One story  Living Alone: Yes  Support Systems: Friend/Neighbor  Patient Expects to be Discharged to[de-identified] Home  Current DME Used/Available at Home: None  Tub or Shower Type: Tub/Shower combination  Critical Behavior:  Neurologic State: Alert  Orientation Level: Oriented to person;Oriented to place;Oriented to situation  Cognition: Follows commands  Safety/Judgement: Awareness of environment; Fall prevention  Psychosocial  Patient Behaviors: Calm; Cooperative  Family  Behaviors: Supportive  Purposeful Interaction: Yes  Pt Identified Daily Priority: Clinical issues (comment)  Caritas Process: Establish trust  Caring Interventions: Reassure  Reassure: Therapeutic listening     Family  Behaviors: Supportive  Skin Integrity:  (stitches 6 posterior head)  Skin Integumentary  Skin Integrity:  (stitches 6 posterior head)     Strength:    Strength: Generally decreased, functional    Tone & Sensation:   Tone: Normal    Sensation: Intact    Range Of Motion:  AROM: Within functional limits    Posture:  Posture (WDL): Within defined limits     Functional Mobility:  Bed Mobility:     Supine to Sit: Supervision  Sit to Supine: Supervision  Scooting: Supervision  Transfers:  Sit to Stand: Stand-by assistance  Stand to Sit: Stand-by assistance    Balance:   Sitting: Intact  Standing: Impaired; With support  Standing - Static: Good  Standing - Dynamic : Fair    Ambulation/Gait Training:  Distance (ft): 30 Feet (ft)  Assistive Device: Walker, rolling  Ambulation - Level of Assistance: Stand-by assistance     Gait Description (WDL): Exceptions to WDL  Gait Abnormalities: Decreased step clearance    Base of Support: Narrowed     Speed/Kiana: Slow;Shuffled  Step Length: Right shortened;Left shortened    Pain:  Pain level pre-treatment: 0/10   Pain level post-treatment: 0/10     Activity Tolerance:   Appropriate tolerance    Please refer to the flowsheet for vital signs taken during this treatment. After treatment:   []         Patient left in no apparent distress sitting up in chair  [x]         Patient left in no apparent distress in bed  [x]         Call bell left within reach  [x]         Nursing notified  []         Caregiver present  []         Bed alarm activated  []         SCDs applied    COMMUNICATION/EDUCATION:   [x]         Role of Physical Therapy in the acute care setting. [x]         Fall prevention education was provided and the patient/caregiver indicated understanding. [x]         Patient/family have participated as able in goal setting and plan of care. [x]         Patient/family agree to work toward stated goals and plan of care. []         Patient understands intent and goals of therapy, but is neutral about his/her participation. []         Patient is unable to participate in goal setting/plan of care: ongoing with therapy staff.  []         Other:     Thank you for this referral.  Radha Davila, PT   Time Calculation: 24 mins      Eval Complexity: History: MEDIUM  Complexity : 1-2 comorbidities / personal factors will impact the outcome/ POC Exam:LOW Complexity : 1-2 Standardized tests and measures addressing body structure, function, activity limitation and / or participation in recreation  Presentation: LOW Complexity : Stable, uncomplicated  Clinical Decision Making:Low Complexity low  Overall Complexity:LOW

## 2022-03-16 ENCOUNTER — APPOINTMENT (OUTPATIENT)
Dept: MRI IMAGING | Age: 82
DRG: 312 | End: 2022-03-16
Attending: INTERNAL MEDICINE
Payer: MEDICARE

## 2022-03-16 PROCEDURE — 36415 COLL VENOUS BLD VENIPUNCTURE: CPT

## 2022-03-16 PROCEDURE — 65660000000 HC RM CCU STEPDOWN

## 2022-03-16 PROCEDURE — 74011250637 HC RX REV CODE- 250/637: Performed by: INTERNAL MEDICINE

## 2022-03-16 PROCEDURE — 86301 IMMUNOASSAY TUMOR CA 19-9: CPT

## 2022-03-16 PROCEDURE — 74011250636 HC RX REV CODE- 250/636: Performed by: STUDENT IN AN ORGANIZED HEALTH CARE EDUCATION/TRAINING PROGRAM

## 2022-03-16 PROCEDURE — A9577 INJ MULTIHANCE: HCPCS | Performed by: STUDENT IN AN ORGANIZED HEALTH CARE EDUCATION/TRAINING PROGRAM

## 2022-03-16 PROCEDURE — 99233 SBSQ HOSP IP/OBS HIGH 50: CPT | Performed by: INTERNAL MEDICINE

## 2022-03-16 PROCEDURE — 99233 SBSQ HOSP IP/OBS HIGH 50: CPT | Performed by: STUDENT IN AN ORGANIZED HEALTH CARE EDUCATION/TRAINING PROGRAM

## 2022-03-16 PROCEDURE — 70553 MRI BRAIN STEM W/O & W/DYE: CPT

## 2022-03-16 PROCEDURE — 74011250637 HC RX REV CODE- 250/637: Performed by: NURSE PRACTITIONER

## 2022-03-16 PROCEDURE — 74011000250 HC RX REV CODE- 250: Performed by: NURSE PRACTITIONER

## 2022-03-16 PROCEDURE — 74011250636 HC RX REV CODE- 250/636: Performed by: NURSE PRACTITIONER

## 2022-03-16 PROCEDURE — 2709999900 HC NON-CHARGEABLE SUPPLY

## 2022-03-16 RX ORDER — GUAIFENESIN 100 MG/5ML
81 LIQUID (ML) ORAL DAILY
Status: DISCONTINUED | OUTPATIENT
Start: 2022-03-17 | End: 2022-03-17 | Stop reason: HOSPADM

## 2022-03-16 RX ORDER — LISINOPRIL 5 MG/1
5 TABLET ORAL DAILY
Status: DISCONTINUED | OUTPATIENT
Start: 2022-03-17 | End: 2022-03-17 | Stop reason: HOSPADM

## 2022-03-16 RX ORDER — CLOPIDOGREL BISULFATE 75 MG/1
75 TABLET ORAL DAILY
Status: DISCONTINUED | OUTPATIENT
Start: 2022-03-17 | End: 2022-03-17 | Stop reason: HOSPADM

## 2022-03-16 RX ORDER — POTASSIUM CHLORIDE 20 MEQ/1
40 TABLET, EXTENDED RELEASE ORAL DAILY
Status: DISCONTINUED | OUTPATIENT
Start: 2022-03-17 | End: 2022-03-17 | Stop reason: HOSPADM

## 2022-03-16 RX ADMIN — GADOBENATE DIMEGLUMINE 8 ML: 529 INJECTION, SOLUTION INTRAVENOUS at 13:55

## 2022-03-16 RX ADMIN — NITROGLYCERIN 0.5 INCH: 20 OINTMENT TOPICAL at 17:15

## 2022-03-16 RX ADMIN — Medication 12 MG: at 00:17

## 2022-03-16 RX ADMIN — Medication 12 MG: at 22:33

## 2022-03-16 RX ADMIN — AZITHROMYCIN DIHYDRATE 250 MG: 250 TABLET, FILM COATED ORAL at 08:11

## 2022-03-16 RX ADMIN — SODIUM CHLORIDE, PRESERVATIVE FREE 10 ML: 5 INJECTION INTRAVENOUS at 22:33

## 2022-03-16 RX ADMIN — FAMOTIDINE 20 MG: 20 TABLET, FILM COATED ORAL at 08:22

## 2022-03-16 RX ADMIN — NITROGLYCERIN 0.5 INCH: 20 OINTMENT TOPICAL at 06:10

## 2022-03-16 RX ADMIN — SODIUM CHLORIDE, PRESERVATIVE FREE 10 ML: 5 INJECTION INTRAVENOUS at 06:59

## 2022-03-16 RX ADMIN — ENOXAPARIN SODIUM 40 MG: 100 INJECTION SUBCUTANEOUS at 08:13

## 2022-03-16 RX ADMIN — SODIUM CHLORIDE, PRESERVATIVE FREE 10 ML: 5 INJECTION INTRAVENOUS at 14:50

## 2022-03-16 RX ADMIN — RIFAMPIN 150 MG: 150 CAPSULE ORAL at 08:11

## 2022-03-16 NOTE — PROGRESS NOTES
.Bedside shift change report given to JENNIFER Fields (oncoming nurse) by ALESSANDRO Eid RN (offgoing nurse). Report included the following information SBAR, Kardex, MAR, Recent Results and Cardiac Rhythm (Sinus Sosa Merry).

## 2022-03-16 NOTE — PROGRESS NOTES
Reason for Admission:  Syncope and collapse [R55]  Hypokalemia [E87.6]                 RUR Score:    8            Plan for utilizing home health:    yes                      Likelihood of Readmission:   LOW                         Transition of Care Plan:              Initial assessment completed with patient. Cognitive status of patient: oriented to time, place, person and situation. Face sheet information confirmed:  yes. The patient designates daughter to participate in her discharge plan and to receive any needed information. This patient lives in a single family home with patient. Patient is able to navigate steps as needed. Prior to hospitalization, patient was considered to be independent with ADLs/IADLS : yes . Patient has a current ACP document on file: no      Healthcare Decision Maker:     Click here to complete [address] including selection of the Healthcare Decision Maker Relationship (ie \"Primary\")    The daughter will be available to transport patient home upon discharge. The patient already has none reported, and  medical equipment available in the home. Patient is not currently active with home health. Patient has not stayed in a skilled nursing facility or rehab. Was  stay within last 60 days : no. This patient is on dialysis :no        List of available Home Health agencies were provided and reviewed with the patient prior to discharge. Freedom of choice signed: yes, for available . Currently, the discharge plan is Home with [address]. The patient states that she can obtain her medications from the pharmacy, and take her medications as directed. Patient's current insurance is medicare and aarp       Care Management Interventions  PCP Verified by CM:  Yes  Mode of Transport at Discharge: Self  Transition of Care Consult (CM Consult): Discharge Planning  Support Systems: Child(perla)  Confirm Follow Up Transport: Family  The Plan for Transition of Care is Related to the Following Treatment Goals : home health  Discharge Location  Patient Expects to be Discharged to[de-identified] Home with home health

## 2022-03-16 NOTE — PROGRESS NOTES
Report received from [facility], [address] [place] [address]. Patient resting comfortably in bed with call bell in reach.

## 2022-03-16 NOTE — PROGRESS NOTES
Cardiology Progress Note    Admit Date: 3/14/2022  Attending Cardiologist: Dr. Paulie Davis:     Hospital Problems  Date Reviewed: 3/16/2022          Codes Class Noted POA    * (Principal) Syncope and collapse ICD-10-CM: R55  ICD-9-CM: 780.2  3/14/2022 Unknown        Hypokalemia ICD-10-CM: E87.6  ICD-9-CM: 276.8  3/14/2022 Unknown        Acid-fast bacteria present ICD-10-CM: R89.9  ICD-9-CM: 795.39  6/30/2021 Yes        Hyperlipidemia LDL goal <130 ICD-10-CM: E78.5  ICD-9-CM: 272.4  3/23/2016 Yes              -Syncope, recurrent. Suspect orthostasis/hypotension in setting of below.  -Hypertension. Currently elevated but BP meds recently decreased for low BP. -Chronic sinus bradycardia. No pauses or high grade AV block. PVCs and PACs noted. Will continue to follow on telemetry off AV gemma agents.   -Possible seizure, will appreciate neurology evaluation.  -Carotid disease with moderate-severe disease, will appreciate vascular involvement.  -Pancreatic mass for which outpatient evaluation was pending, will appreciate GI evaluation. 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  -Right posterior scalp soft tissue contusion/hematoma. -Unintentional weight loss. -Hypokalemia. Replace as needed. -Hyponatremia. Continue to trend.  -Anemia. Continue to trend.  -Hyperlipidemia. No longer on statin due to intolerance.     Primary cardiologist Dr. Raven Avalos        Echo 6/2021    Left Ventricle: Left ventricle size is normal. Mildly increased wall thickness. Normal wall motion. Normal left ventricular systolic function with a visually estimated EF of 55 - 60%.   Aortic Valve: Mild transvalvular regurgitation.       Nuclear stress 6/2021  · Baseline ECG: Normal sinus rhythm. · Stress test: Negative stress test.  · SPECT: Left ventricular function post-stress was normal. Calculated ejection fraction is 72%.  There is no evidence of transient ischemic dilation (TID). The TID ratio is 0.89. · SPECT: Left ventricular perfusion is normal. Myocardial perfusion imaging supports a low risk stress test.      Plan:     Echo unremarkable. No significant tele events overnight. Orthostatic vitals appeared unremarkable, would resume lisinopril half dose. Would hold off on HCTZ at this time. Would continue to replace potassium as needed. Continue work up and treatment of WESLEY per ID team.  Continue work up and treatment of pancreatic lesion per GI team.  Continue work up and treatment of carotid disease per vascular team.  Contine nutritional support, encourage oral intake.     Subjective:     Ambulated without complaints      Objective:      Patient Vitals for the past 8 hrs:   Temp Pulse Resp BP SpO2   03/16/22 0842 97.5 °F (36.4 °C) 65 18 (!) 179/75 99 %   03/16/22 0629 -- -- -- (!) 172/80 --   03/16/22 0610 -- (!) 55 -- (!) 193/82 --         Patient Vitals for the past 96 hrs:   Weight   03/15/22 1059 96 lb (43.5 kg)   03/14/22 2251 96 lb 12.8 oz (43.9 kg)   03/14/22 1049 99 lb (44.9 kg)       TELE: normal sinus rhythm               Current Facility-Administered Medications   Medication Dose Route Frequency Last Admin    nitroglycerin (NITROBID) 2 % ointment 0.5 Inch  0.5 Inch Topical Q6H 0.5 Inch at 03/16/22 0610    famotidine (PEPCID) tablet 20 mg  20 mg Oral DAILY 20 mg at 03/16/22 9935    melatonin tablet 12 mg  12 mg Oral QHS PRN 12 mg at 03/16/22 0017    sodium chloride (NS) flush 5-40 mL  5-40 mL IntraVENous Q8H 10 mL at 03/16/22 0659    sodium chloride (NS) flush 5-40 mL  5-40 mL IntraVENous PRN      acetaminophen (TYLENOL) tablet 650 mg  650 mg Oral Q6H PRN      Or    acetaminophen (TYLENOL) suppository 650 mg  650 mg Rectal Q6H PRN      polyethylene glycol (MIRALAX) packet 17 g  17 g Oral DAILY PRN      ondansetron (ZOFRAN ODT) tablet 4 mg  4 mg Oral Q8H PRN      Or    ondansetron (ZOFRAN) injection 4 mg  4 mg IntraVENous Q6H PRN      enoxaparin (LOVENOX) injection 40 mg  40 mg SubCUTAneous DAILY 40 mg at 03/16/22 0813    azithromycin (ZITHROMAX) tablet 250 mg  250 mg Oral DAILY 250 mg at 03/16/22 1753    rifAMPin (RIFADIN) capsule 150 mg  150 mg Oral  mg at 03/16/22 0811         Intake/Output Summary (Last 24 hours) at 3/16/2022 1221  Last data filed at 3/16/2022 1142  Gross per 24 hour   Intake 460 ml   Output 550 ml   Net -90 ml       Physical Exam:  General:  alert, cooperative, no distress, appears stated age  Neck:  no JVD  Lungs:  clear to auscultation bilaterally  Heart:  regular rate and rhythm  Abdomen:  abdomen is soft without significant tenderness, masses, organomegaly or guarding  Extremities:  extremities normal, atraumatic, no cyanosis or edema    Visit Vitals  BP (!) 179/75 (BP 1 Location: Right upper arm, BP Patient Position: At rest)   Pulse 65   Temp 97.5 °F (36.4 °C)   Resp 18   Ht 5' (1.524 m)   Wt 96 lb (43.5 kg)   SpO2 99%   Breastfeeding No   BMI 18.75 kg/m²       Data Review:     Labs: Results:       Chemistry Recent Labs     03/15/22  0354 03/14/22  1120   * 160*   * 134*   K 3.1* 3.4*   CL 98* 95*   CO2 29 33*   BUN 17 16   CREA 0.64 0.78   CA 8.2* 8.3*   MG  --  1.9   AGAP 5 6   BUCR 27* 21*   AP  --  61   TP  --  6.8   ALB  --  3.2*   GLOB  --  3.6   AGRAT  --  0.9      CBC w/Diff Recent Labs     03/15/22  0354 03/14/22  1120   WBC 3.4* 5.4   RBC 3.63* 4.05*   HGB 10.9* 12.5   HCT 31.6* 35.6    157   GRANS 50 73   LYMPH 35 17*   EOS 1 1      Cardiac Enzymes No results found for: CPK, CK, CKMMB, CKMB, RCK3, CKMBT, CKNDX, CKND1, JERZY, TROPT, TROIQ, SANTIAGO, TROPT, TNIPOC, BNP, BNPP   Coagulation Recent Labs     03/14/22  1120   PTP 12.8   INR 1.0   APTT 28.8       Lipid Panel Lab Results   Component Value Date/Time    Cholesterol, total 229 (H) 11/05/2021 09:09 AM    HDL Cholesterol 60 11/05/2021 09:09 AM    LDL, calculated 149.4 (H) 11/05/2021 09:09 AM    VLDL, calculated 19.6 11/05/2021 09:09 AM    Triglyceride 98 11/05/2021 09:09 AM    CHOL/HDL Ratio 3.8 11/05/2021 09:09 AM      BNP No results found for: BNP, BNPP, XBNPT   Liver Enzymes Recent Labs     03/14/22  1120   TP 6.8   ALB 3.2*   AP 61      Thyroid Studies Lab Results   Component Value Date/Time    TSH 3.26 03/14/2022 11:20 AM          Signed By: LICHA Norman     March 16, 2022

## 2022-03-16 NOTE — PROGRESS NOTES
[email or web address]  [phone]    Gastroenterology follow up-Progress note    Impression:  1. Pancreatic lesion with interval size change - Cystic lesion, head of pancreas, may be IPMN (side branch) but internal septations suggestive of a HIGHER RISK lesion, ie mucinous cystic neoplasm. EGD/EUS would be indicated for prognostic purpose only as patient not good candidate for resection. 2. Weight loss - 8 to 10 lbs over past 2 months  3. Syncope and collapse - ? Seizure disorder  4. Bradycardia  5. Hypokalemia  6. 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 Anemia - mild, will trend  8. Right posterior scalp soft tissue contusion/hematoma    Plan:  1. Continue with conservative management and supportive care. EGD/EUS for prognostic purposes only would need to be done as OP, patient and family to discuss. Would recommend follow up with Dr. Zepeda Doing if EGD/EUS is pursued. 2. Agree with nutrition consult  3. Appreciate input from ID and cardiology  4. Medical management per primary team    No GI interventions indicated at this time, will be available should any questions or other issues arise. Chief Complaint: Pancreatic lesion      Subjective:  No complaints, daughter at bedside, eating breakfast during encounter. ROS: Denies any fevers, chills, rash.      Eyes: conjunctiva normal, EOM normal   Neck: ROM normal, supple and trachea normal   Cardiovascular: heart normal, intact distal pulses, normal rate and regular rhythm   Pulmonary/Chest Wall: breath sounds normal and effort normal   Abdominal: appearance normal, bowel sounds normal and soft, non-acute, non-tender     Patient Active Problem List   Diagnosis Code    Eczema L30.9    Glaucoma H40.9    Generalized osteoarthrosis, involving multiple sites M15.9    Essential hypertension I10    Hyperlipidemia LDL goal <130 E78.5    Raynaud's disease without gangrene I73.00    Impaired fasting glucose R73.01    Nonrheumatic aortic valve insufficiency I35.1    Acid-fast bacteria present R89.9    Chronic constipation K59.09    Syncope and collapse R55    Hypokalemia E87.6         Visit Vitals  BP (!) 179/75 (BP 1 Location: Right upper arm, BP Patient Position: At rest)   Pulse 65   Temp 97.5 °F (36.4 °C)   Resp 18   Ht 5' (1.524 m)   Wt 43.5 kg (96 lb)   SpO2 99%   Breastfeeding No   BMI 18.75 kg/m²           Intake/Output Summary (Last 24 hours) at 3/16/2022 1003  Last data filed at 3/15/2022 1356  Gross per 24 hour   Intake 240 ml   Output 400 ml   Net -160 ml       CBC w/Diff    Lab Results   Component Value Date/Time    WBC 3.4 (L) 03/15/2022 03:54 AM    RBC 3.63 (L) 03/15/2022 03:54 AM    HGB 10.9 (L) 03/15/2022 03:54 AM    HCT 31.6 (L) 03/15/2022 03:54 AM    MCV 87.1 03/15/2022 03:54 AM    MCH 30.0 03/15/2022 03:54 AM    MCHC 34.5 03/15/2022 03:54 AM    RDW 12.5 03/15/2022 03:54 AM     03/15/2022 03:54 AM    Lab Results   Component Value Date/Time    GRANS 50 03/15/2022 03:54 AM    LYMPH 35 03/15/2022 03:54 AM    EOS 1 03/15/2022 03:54 AM    BASOS 0 03/15/2022 03:54 AM      Basic Metabolic Profile   Recent Labs     03/15/22  0354 03/14/22  1120 03/14/22  1120   *   < > 134*   K 3.1*   < > 3.4*   CL 98*   < > 95*   CO2 29   < > 33*   BUN 17   < > 16   CA 8.2*   < > 8.3*   MG  --   --  1.9    < > = values in this interval not displayed. Hepatic Function    Lab Results   Component Value Date/Time    ALB 3.2 (L) 03/14/2022 11:20 AM    TP 6.8 03/14/2022 11:20 AM    AP 61 03/14/2022 11:20 AM    No results found for: TBIL       [facility]   Recent Labs     03/14/22  1120   PTP 12.8   INR 1.0   APTT 28.8               LICHA Helms    [facility]. [employer]  Phone: [phone]  Pager: [phone]

## 2022-03-16 NOTE — PROGRESS NOTES
[facility]  (A Division of [address] [place] [address])    Follow-up Note      Date of Admission: 3/14/2022       Date of Note:  3/16/2022          Current Antimicrobials:    Prior Antimicrobials:  Azithromycin   Rifabitin      Assessment:           Pulmonary WESLEY-followed by Dr. Hayes Dutton. bronchoscopy 5/19/21 with AFB cultures + x 2 for WESLEY, AFB smears were negative. Started on Azithro + Rif, pending initiation on Ethambutol. Current chest x-ray without significant infiltrates. Recurrent syncope and falls:  Suspect related to orthostatics. Further w/u in progress  Bradycardia:  Cardiology following. Unlikley related to azithromycin as not documented QT prolongation. 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 GI following- Suggests EGD? EUS  Unexplained weight loss:  ? Related to WESLEY vs pancreatic process. Electrolyte abnormalities. Iron deficiency anemia    Plan:   Continue with Azithromycin and Rifampin (on Rifabutin at home). Ethambutol to be added after discharge. Will need periodic EKG during therapy to monitor for QT prolongation, especially given recent bradycardia. CBC, CMP to monitor for drug associated toxicities. Will continue to follow. Happy to see in OP ID clinic as well. Juma Saldivar DO  [facility]  [address], [address] [facility] [address]  Office: [phone]  Mobile/Text: [phone]     Microbiology:  none    Lines / Catheters:  peripehral    Subjective:   Patient seen and examined at bedside. Daughter is present. Patient seems depressed and frustrated- wants to get up and out of bed, and try to go home. No particular complaints otherwise. No further dizziness.     Objective:        Visit Vitals  BP (!) 179/75 (BP 1 Location: Right upper arm, BP Patient Position: At rest)   Pulse 65   Temp 97.5 °F (36.4 °C)   Resp 18   Ht 5' (1.524 m)   Wt 43.5 kg (96 lb)   SpO2 99%   Breastfeeding No   BMI 18.75 kg/m²     Temp (24hrs), Av °F (36.7 °C), Min:97.5 °F (36.4 °C), Max:98.3 °F (36.8 °C)        General:   awake alert and oriented, non-toxic, thin   Skin:   no rashes or skin lesions noted on limited exam, dry and warm; laceration on her scalp crusted over and without further bleeding   HEENT:  No scleral icterus or pallor; oral mucosa moist, lips moist   Lymph Nodes:   not assessed today   Lungs:   non, labored; bilaterally clear to aspiration- no crackles wheezes rales or rhonchi   Heart:  RRR, s1 and s2; no murmurs rubs or gallops; no edema, + pedal pulses   Abdomen:  soft, non-distended, active bowel sounds, non-tender   Genitourinary:  deferred   Extremities:   average muscle tone; no contractures, no joint effusions   Neurologic:  No gross focal motor or sensory abnormalities; CN 2-12 intact; Follows commands.     Psychiatric:   calm, seems depressed         Lab results:    Chemistry  Recent Labs     03/15/22  0354 22  1120   * 160*   * 134*   K 3.1* 3.4*   CL 98* 95*   CO2 29 33*   BUN 17 16   CREA 0.64 0.78   CA 8.2* 8.3*   AGAP 5 6   BUCR 27* 21*   AP  --  61   TP  --  6.8   ALB  --  3.2*   GLOB  --  3.6   AGRAT  --  0.9       CBC w/ Diff  Recent Labs     03/15/22  0354 22  1120   WBC 3.4* 5.4   RBC 3.63* 4.05*   HGB 10.9* 12.5   HCT 31.6* 35.6    157   GRANS 50 73   LYMPH 35 17*   EOS 1 1       Microbiology  All Micro Results     None           Santosh Hernandes DO   3/16/2022

## 2022-03-17 ENCOUNTER — APPOINTMENT (OUTPATIENT)
Dept: NON INVASIVE DIAGNOSTICS | Age: 82
DRG: 312 | End: 2022-03-17
Attending: INTERNAL MEDICINE
Payer: MEDICARE

## 2022-03-17 ENCOUNTER — APPOINTMENT (OUTPATIENT)
Dept: CT IMAGING | Age: 82
DRG: 312 | End: 2022-03-17
Attending: STUDENT IN AN ORGANIZED HEALTH CARE EDUCATION/TRAINING PROGRAM
Payer: MEDICARE

## 2022-03-17 ENCOUNTER — TELEPHONE (OUTPATIENT)
Dept: INTERNAL MEDICINE CLINIC | Age: 82
End: 2022-03-17

## 2022-03-17 VITALS
HEIGHT: 60 IN | SYSTOLIC BLOOD PRESSURE: 167 MMHG | TEMPERATURE: 97.9 F | RESPIRATION RATE: 14 BRPM | BODY MASS INDEX: 18.85 KG/M2 | DIASTOLIC BLOOD PRESSURE: 73 MMHG | OXYGEN SATURATION: 97 % | HEART RATE: 69 BPM | WEIGHT: 96 LBS

## 2022-03-17 PROBLEM — A31.0 MAI (MYCOBACTERIUM AVIUM-INTRACELLULARE) (HCC): Status: ACTIVE | Noted: 2022-03-17

## 2022-03-17 PROBLEM — D18.1 HYGROMA: Status: ACTIVE | Noted: 2022-03-17

## 2022-03-17 LAB — CANCER AG19-9 SERPL-ACNC: 41 U/ML (ref 0–35)

## 2022-03-17 PROCEDURE — 74011250637 HC RX REV CODE- 250/637: Performed by: NURSE PRACTITIONER

## 2022-03-17 PROCEDURE — 74011250637 HC RX REV CODE- 250/637: Performed by: INTERNAL MEDICINE

## 2022-03-17 PROCEDURE — 74011250636 HC RX REV CODE- 250/636: Performed by: NURSE PRACTITIONER

## 2022-03-17 PROCEDURE — 70450 CT HEAD/BRAIN W/O DYE: CPT

## 2022-03-17 PROCEDURE — 74011250637 HC RX REV CODE- 250/637: Performed by: PHYSICIAN ASSISTANT

## 2022-03-17 PROCEDURE — 93270 REMOTE 30 DAY ECG REV/REPORT: CPT

## 2022-03-17 PROCEDURE — 99232 SBSQ HOSP IP/OBS MODERATE 35: CPT | Performed by: INTERNAL MEDICINE

## 2022-03-17 PROCEDURE — 74011250637 HC RX REV CODE- 250/637: Performed by: STUDENT IN AN ORGANIZED HEALTH CARE EDUCATION/TRAINING PROGRAM

## 2022-03-17 PROCEDURE — 99239 HOSP IP/OBS DSCHRG MGMT >30: CPT | Performed by: STUDENT IN AN ORGANIZED HEALTH CARE EDUCATION/TRAINING PROGRAM

## 2022-03-17 PROCEDURE — 74011000250 HC RX REV CODE- 250: Performed by: NURSE PRACTITIONER

## 2022-03-17 RX ORDER — ETHAMBUTOL HYDROCHLORIDE 100 MG/1
200 TABLET, FILM COATED ORAL DAILY
Qty: 60 TABLET | Refills: 0 | Status: SHIPPED | OUTPATIENT
Start: 2022-03-17 | End: 2022-04-06

## 2022-03-17 RX ORDER — LISINOPRIL 5 MG/1
5 TABLET ORAL DAILY
Qty: 30 TABLET | Refills: 0 | Status: SHIPPED | OUTPATIENT
Start: 2022-03-18 | End: 2022-04-08 | Stop reason: SDUPTHER

## 2022-03-17 RX ORDER — ETHAMBUTOL HYDROCHLORIDE 400 MG/1
400 TABLET, FILM COATED ORAL DAILY
Qty: 30 TABLET | Refills: 0 | Status: SHIPPED | OUTPATIENT
Start: 2022-03-17 | End: 2022-04-06

## 2022-03-17 RX ORDER — CLOPIDOGREL BISULFATE 75 MG/1
75 TABLET ORAL DAILY
Qty: 30 TABLET | Refills: 1 | Status: SHIPPED | OUTPATIENT
Start: 2022-03-18 | End: 2022-03-30

## 2022-03-17 RX ADMIN — SODIUM CHLORIDE, PRESERVATIVE FREE 10 ML: 5 INJECTION INTRAVENOUS at 06:29

## 2022-03-17 RX ADMIN — RIFAMPIN 150 MG: 150 CAPSULE ORAL at 06:25

## 2022-03-17 RX ADMIN — ENOXAPARIN SODIUM 40 MG: 100 INJECTION SUBCUTANEOUS at 09:52

## 2022-03-17 RX ADMIN — FAMOTIDINE 20 MG: 20 TABLET, FILM COATED ORAL at 09:52

## 2022-03-17 RX ADMIN — ASPIRIN 81 MG 81 MG: 81 TABLET ORAL at 09:52

## 2022-03-17 RX ADMIN — NITROGLYCERIN 0.5 INCH: 20 OINTMENT TOPICAL at 06:25

## 2022-03-17 RX ADMIN — CLOPIDOGREL BISULFATE 75 MG: 75 TABLET ORAL at 09:52

## 2022-03-17 RX ADMIN — AZITHROMYCIN DIHYDRATE 250 MG: 250 TABLET, FILM COATED ORAL at 09:52

## 2022-03-17 RX ADMIN — POTASSIUM CHLORIDE 40 MEQ: 20 TABLET, EXTENDED RELEASE ORAL at 09:52

## 2022-03-17 RX ADMIN — NITROGLYCERIN 0.5 INCH: 20 OINTMENT TOPICAL at 00:34

## 2022-03-17 RX ADMIN — LISINOPRIL 5 MG: 5 TABLET ORAL at 09:52

## 2022-03-17 NOTE — ADVANCED PRACTICE NURSE
Cardiology Initial Patient Referral Note    Cardiology referral request from Dr. Sherryle Blitz for evaluation and management/treatment of syncope/bradycardia    Date of  Admission: 3/14/2022 10:51 AM   Primary Care Physician:  Kati Contreras MD    Attending Cardiologist: Dr. Mat Rodriguez:   1. Syncope with fall - orthostatic hypotension .    2. 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 Lisinopril home dose decreased to 5 mg, held HCTZ. 3.  Chronic bradycardia, no excessive pause on telemetry   F/U with cardiologist in 1 month after D/C  Left ICA stenosis, 50-69%,  Right ICA stenosis <50%   Started on ASA, Plavix.   Hospital Problems  Date Reviewed: 3/16/2022          Codes Class Noted POA    * (Principal) Syncope and collapse ICD-10-CM: R55  ICD-9-CM: 780.2  3/14/2022 Unknown        Hypokalemia ICD-10-CM: E87.6  ICD-9-CM: 276.8  3/14/2022 Unknown        Acid-fast bacteria present ICD-10-CM: R89.9  ICD-9-CM: 795.39  6/30/2021 Yes        Hyperlipidemia LDL goal <130 ICD-10-CM: E78.5  ICD-9-CM: 272.4  3/23/2016 Yes                 Plan:   1. Syncope with fall - orthostatic hypotension .    2. 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 Lisinopril home dose decreased to 5 mg, held HCTZ. 3.  Chronic bradycardia, no excessive pause on telemetry   F/U with cardiologist in 1 month after D/C  Left ICA stenosis, 50-69%,  Right ICA stenosis <50%   Started on ASA, Plavix.      History of Present Illness: This is a [de-identified] y.o. female admitted for Syncope and collapse [R55]  Hypokalemia [E87.6]. Pt denies any chest discomfort, headache, lightheadedness. Pt up ambulating to bathroom with daughter assisting. Pt aware of discharge planning with home health today. Will have small heart monitor device placed this morning.           Cardiac risk factors:   Review of Symptoms:  General:  Pleasant, Cooperative, Not in acute distress, HEENT: PERRL, EOMI, supple neck, no JVD, dryness to oral mucosa  Cardiovascular: S1 S2 regular, no rub/gallop   Pulmonary: lungs CTA bilaterally, no wheezing, no crackles  GI:  Soft, non tender, non distended, positive BS,  no guarding   Extremities:  No pedal edema, +distal pulses appreciated   Neuro: AOx3, moving all extremities, no gross deficit. {Ros - complete:97295}     Past Medical History:     Past Medical History:   Diagnosis Date    Diverticulosis of sigmoid colon 11/13/15    mild; Dr. Lalit Salazar Eczema     Elevated cholesterol     Family history of diabetes mellitus (DM)     Glaucoma     Hyperlipidemia     Hypertension     Indigestion     Interpolated PVCs          Social History:     Social History     Socioeconomic History    Marital status:    Tobacco Use    Smoking status: Never Smoker    Smokeless tobacco: Never Used   Substance and Sexual Activity    Alcohol use: No     Comment: with dinner a few days per week    Drug use: No    Sexual activity: Not Currently        Family History:     Family History   Problem Relation Age of Onset   Peyman Adams Glaucoma Mother     Other Father         cerebral aneurysm    Hypertension Sister     Diabetes Sister     Hypertension Sister     Diabetes Paternal Uncle         Medications:      Allergies   Allergen Reactions    Pravastatin Other (comments)     Arthralgias/myalgias        Current Facility-Administered Medications   Medication Dose Route Frequency    nitroglycerin (NITROBID) 2 % ointment 0.5 Inch  0.5 Inch Topical Q6H    lisinopriL (PRINIVIL, ZESTRIL) tablet 5 mg  5 mg Oral DAILY    clopidogreL (PLAVIX) tablet 75 mg  75 mg Oral DAILY    aspirin chewable tablet 81 mg  81 mg Oral DAILY    potassium chloride (K-DUR, KLOR-CON M20) SR tablet 40 mEq  40 mEq Oral DAILY    famotidine (PEPCID) tablet 20 mg  20 mg Oral DAILY    melatonin tablet 12 mg  12 mg Oral QHS PRN    sodium chloride (NS) flush 5-40 mL  5-40 mL IntraVENous Q8H    sodium chloride (NS) flush 5-40 mL  5-40 mL IntraVENous PRN    acetaminophen (TYLENOL) tablet 650 mg  650 mg Oral Q6H PRN    Or    acetaminophen (TYLENOL) suppository 650 mg  650 mg Rectal Q6H PRN    polyethylene glycol (MIRALAX) packet 17 g  17 g Oral DAILY PRN    ondansetron (ZOFRAN ODT) tablet 4 mg  4 mg Oral Q8H PRN    Or    ondansetron (ZOFRAN) injection 4 mg  4 mg IntraVENous Q6H PRN    enoxaparin (LOVENOX) injection 40 mg  40 mg SubCUTAneous DAILY    azithromycin (ZITHROMAX) tablet 250 mg  250 mg Oral DAILY    rifAMPin (RIFADIN) capsule 150 mg  150 mg Oral ACB         Physical Exam:     Visit Vitals  BP (!) 167/73 (BP 1 Location: Left upper arm, BP Patient Position: At rest)   Pulse 69   Temp 97.9 °F (36.6 °C)   Resp 14   Ht 5' (1.524 m)   Wt 43.5 kg (96 lb)   SpO2 97%   Breastfeeding No   BMI 18.75 kg/m²       TELE: {Telemetry:[id number]}    BP Readings from Last 3 Encounters:   03/17/22 (!) 167/73   02/16/22 (!) 155/64   12/17/21 (!) 148/68     Pulse Readings from Last 3 Encounters:   03/17/22 69   02/16/22 (!) 56   12/17/21 65     Wt Readings from Last 3 Encounters:   03/15/22 43.5 kg (96 lb)   02/16/22 45.4 kg (100 lb)   12/17/21 44.9 kg (99 lb)       General:         Data Review:     Recent Labs     03/15/22  0354 03/14/22  1120   WBC 3.4* 5.4   HGB 10.9* 12.5   HCT 31.6* 35.6    157     Recent Labs     03/15/22  0354 03/14/22  1120   * 134*   K 3.1* 3.4*   CL 98* 95*   CO2 29 33*   * 160*   BUN 17 16   CREA 0.64 0.78   CA 8.2* 8.3*   MG  --  1.9   ALB  --  3.2*   ALT  --  36   INR  --  1.0       Results for orders placed or performed during the hospital encounter of 03/14/22   EKG, 12 LEAD, INITIAL   Result Value Ref Range    Ventricular Rate 51 BPM    Atrial Rate 288 BPM    QRS Duration 82 ms    Q-T Interval 424 ms    QTC Calculation (Bezet) 390 ms    Calculated R Axis -3 degrees    Calculated T Axis 66 degrees    Diagnosis       Sinus bradycardia  Cannot exclude anterior MI versus lead placement issue. Abnormal ECG  When compared with ECG of 19-FEB-2021 16:56,  No significant change was found  Confirmed by Corine Lopes MD, Zackery Amber ([id number]) on 3/14/2022 4:03:16 PM     Results for orders placed or performed in visit on 05/23/12   AMB POC EKG ROUTINE W/ 12 LEADS, INTER & REP    Impression    #1. Sinus bradycardia at 41 beats per minute. EKG is otherwise normal       All Cardiac Markers in the last 24 hours:  No results found for: CPK, CK, CKMMB, CKMB, RCK3, CKMBT, CKNDX, CKND1, JERZY, TROPT, TROIQ, SANTIAGO, TROPT, TNIPOC, BNP, BNPP    Last Lipid:    Lab Results   Component Value Date/Time    Cholesterol, total 229 (H) 11/05/2021 09:09 AM    HDL Cholesterol 60 11/05/2021 09:09 AM    LDL, calculated 149.4 (H) 11/05/2021 09:09 AM    Triglyceride 98 11/05/2021 09:09 AM    CHOL/HDL Ratio 3.8 11/05/2021 09:09 AM       Cardiographics:     EKG Results     Procedure 720 Value Units Date/Time    EKG, 12 LEAD, INITIAL [[id number]] Collected: 03/14/22 1020    Order Status: Completed Updated: 03/14/22 1603     Ventricular Rate 51 BPM      Atrial Rate 288 BPM      QRS Duration 82 ms      Q-T Interval 424 ms      QTC Calculation (Bezet) 390 ms      Calculated R Axis -3 degrees      Calculated T Axis 66 degrees      Diagnosis --     Sinus bradycardia  Cannot exclude anterior MI versus lead placement issue. Abnormal ECG  When compared with ECG of 19-FEB-2021 16:56,  No significant change was found  Confirmed by Corine Lopes MD, Caio Wadsworth ([id number]) on 3/14/2022 4:03:16 PM          03/14/22    ECHO ADULT FOLLOW-UP OR LIMITED 03/15/2022 3/15/2022    Interpretation Summary    Left Ventricle: Left ventricle size is normal. Mildly increased wall thickness. Normal wall motion. Normal left ventricular systolic function with a visually estimated EF of 55 - 60%.   Aortic Valve: Mild transvalvular regurgitation.     Signed by: Alice An MD on 3/15/2022  1:13 PM      06/04/21    NUCLEAR CARDIAC STRESS TEST 06/04/2021 7/22/2021    Interpretation Summary  · Baseline ECG: Normal sinus rhythm. · Stress test: Negative stress test.  · SPECT: Left ventricular function post-stress was normal. Calculated ejection fraction is 72%. There is no evidence of transient ischemic dilation (TID). The TID ratio is 0.89. · SPECT: Left ventricular perfusion is normal. Myocardial perfusion imaging supports a low risk stress test.    Signed by: Barrington Arredondo MD on 6/4/2021 10:21 AM        XR Results (most recent):  Results from Hospital Encounter encounter on 03/14/22    XR CHEST SNGL V    Narrative  PORTABLE CHEST X-RAY    CPT CODE: 16904    INDICATION: Syncope. COMPARISON: Plain film 2/19/2021. FINDINGS:  Heart size normal. Aortic arch not enlarged. Small amount of calcification in  the arch  No pneumothorax appreciated. No overt pulmonary edema. No focal infiltrate or consolidation. No significant effusion on portable AP  image. Thoracolumbar curvature convex to the right, similar. Impression  1. No evidence of acute cardiopulmonary disease.         Signed By: Clare Mendez     March 17, 2022

## 2022-03-17 NOTE — PROGRESS NOTES
1300 Report received from [address] [place] [address] and [facility]. Pt dressed and ready for discharge. Daughter at bedside. 1341 Discharge instructions, med list and where to  meds discussed with patient and daughter. Expressed understanding. Discharged home. To exit per W/C. No distress noted at time of transport. /

## 2022-03-17 NOTE — PROGRESS NOTES
.Bedside shift change report given to Codie Blair RN (oncoming nurse) by ALESSANDRO Triplett RN (offgoing nurse). Report included the following information SBAR, Kardex, Intake/Output, MAR, Recent Results, and Cardiac Rhythm (Sinus Marleen Feast) .

## 2022-03-17 NOTE — PROGRESS NOTES
Hospitalist Progress Note    Patient: Hipolito Seay Age: [de-identified] y.o. : 1940 MR#: [id number] SSN: [id number]  Date/Time: 3/16/2022 8:59 AM    DOA: 3/14/2022  PCP: Jamal Gibbs MD    Subjective:     Patient doing well today. Daughter in bedside. Reviewed labs, imaging at bedside     ROS:  No current fever/chills, no headache, no dizziness, no facial pain, no sinus congestion,   No swallowing pain, No chest pain, no palpitation, no shortness of breath, no abd pain,  No diarrhea, no urinary complaint, no leg pain or swelling      Assessment/Plan:   1. Syncope with collapse - likely secondary to orthostatic hypotension   2. Jerking/shaking observed by daughter at home while she was down, this could possibly be convulsive syncope. Doubt that this was seizure disorder. 3. 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 Neurology consulted. Suggested interval CT to check stability of hygroma. Hygroma likely secondary to trauma from falling. CT ordered, results to be included when available. Cardiology consulted - echo without any abnormalities. Lisinopril home dose decreased to 5 mg, held HCTZ. 4.  Chronic bradycardia, no prolonged pause on telemetry    #4. No intervention needed. Cardiology (Dr. Rochelle Shabazz) to arrange 14 day event monitor at discharge. 5.  Left ICA stenosis, 50-69%,  Right ICA stenosis <50%    #4. Vascular consulted. Started on ASA, Plavix. 6.  Right posterior scalp soft tissue laceration, s/p repair    #6. Closed with 8 staples on 3/14/22. Will need removal in approximately 1 week. 7.  Hypertension    #7. Stable on lisinopril 5 mg.   8.   Hypokalemia, hyponatremia   #8. Potassium orally repleted. Minor hyponatremia at 132.   9.   Mycobacterium avium infection, on chronic Azithromycin and Rifabutin    #8. Infectious disease consulted. Patient on Rifabutin at home. Ethambutol to be added on discharge. 10.   Pancreatic lesion with interval size change    #10. GI consulted.  No inpatient workup recommended. EGD for prognostic purposes to be completed outpatient with Dr. Pat Lara if family wishes to pursue. 11.  unintentional weight loss, 8 lbs    #11. Patient with history of food restricting secondary to health fears. Although, without definitive diagnosis of pancreatic lesion, cannot rule out catabolic process. Could also be associated with WESLEY. 12.  Normocytic anemia   13. Leukopenia    #12-13. No intervention needed. Patient did not require transfusion. Baseline Hgb appears to around 12.5. Full code  Daughter at bedside. Update on all studies and current results.        Disposition planning: home tomorrow if no event in telemetry, CT brain   Family updated (.daughter updated at bedside.)  Additional Notes:    Time spent >35 minutes    Case discussed with:  [x]Patient  [x]Family  [x]Nursing  [x]Case Management  DVT Prophylaxis:  [x]Lovenox  []Hep SQ  []SCDs  []Coumadin   []On Heparin gtt    Signed By: Claudene Dauphin, DO     2022 8:59 AM              Objective:   VS:   Visit Vitals  BP (!) 166/74 (BP 1 Location: Right upper arm, BP Patient Position: At rest)   Pulse 72   Temp 98 °F (36.7 °C)   Resp 18   Ht 5' (1.524 m)   Wt 43.5 kg (96 lb)   SpO2 97%   Breastfeeding No   BMI 18.75 kg/m²      Tmax/24hrs: Temp (24hrs), Av.9 °F (36.6 °C), Min:97.5 °F (36.4 °C), Max:98.1 °F (36.7 °C)      Intake/Output Summary (Last 24 hours) at 3/16/2022 2023  Last data filed at 3/16/2022 1142  Gross per 24 hour   Intake 220 ml   Output 150 ml   Net 70 ml       Tele: sinus bradycardia   General:  Cooperative, Not in acute distress, speaks in full sentence while in bed  HEENT: PERRL, EOMI, supple neck, no JVD, dry oral mucosa  Cardiovascular: S1S2 regular, no rub/gallop   Pulmonary: air entry bilaterally, no wheezing, no crackle  GI:  Soft, non tender, non distended, +bs, no guarding   Extremities:  No pedal edema, +distal pulses appreciated   Neuro: AOx3, moving all extremities, no gross deficit. Additional:       Current Facility-Administered Medications   Medication Dose Route Frequency    nitroglycerin (NITROBID) 2 % ointment 0.5 Inch  0.5 Inch Topical Q6H    [START ON 3/17/2022] lisinopriL (PRINIVIL, ZESTRIL) tablet 5 mg  5 mg Oral DAILY    [START ON 3/17/2022] clopidogreL (PLAVIX) tablet 75 mg  75 mg Oral DAILY    [START ON 3/17/2022] aspirin chewable tablet 81 mg  81 mg Oral DAILY    famotidine (PEPCID) tablet 20 mg  20 mg Oral DAILY    melatonin tablet 12 mg  12 mg Oral QHS PRN    sodium chloride (NS) flush 5-40 mL  5-40 mL IntraVENous Q8H    sodium chloride (NS) flush 5-40 mL  5-40 mL IntraVENous PRN    acetaminophen (TYLENOL) tablet 650 mg  650 mg Oral Q6H PRN    Or    acetaminophen (TYLENOL) suppository 650 mg  650 mg Rectal Q6H PRN    polyethylene glycol (MIRALAX) packet 17 g  17 g Oral DAILY PRN    ondansetron (ZOFRAN ODT) tablet 4 mg  4 mg Oral Q8H PRN    Or    ondansetron (ZOFRAN) injection 4 mg  4 mg IntraVENous Q6H PRN    enoxaparin (LOVENOX) injection 40 mg  40 mg SubCUTAneous DAILY    azithromycin (ZITHROMAX) tablet 250 mg  250 mg Oral DAILY    rifAMPin (RIFADIN) capsule 150 mg  150 mg Oral ACB            Lab/Data Review:  Labs: Results:       Chemistry Recent Labs     03/15/22  0354 03/14/22  1120   * 160*   * 134*   K 3.1* 3.4*   CL 98* 95*   CO2 29 33*   BUN 17 16   CREA 0.64 0.78   BUCR 27* 21*   AGAP 5 6   CA 8.2* 8.3*     Recent Labs     03/14/22  1120   ALT 36   TP 6.8   ALB 3.2*   GLOB 3.6   AGRAT 0.9      CBC w/Diff Recent Labs     03/15/22  0354 03/14/22  1120 03/14/22  1120   WBC 3.4*  --  5.4   RBC 3.63*  --  4.05*   HGB 10.9*  --  12.5   HCT 31.6*  --  35.6   MCV 87.1   < > 87.9   MCH 30.0   < > 30.9   MCHC 34.5   < > 35.1   RDW 12.5   < > 12.5     --  157   GRANS 50  --  73   LYMPH 35  --  17*   EOS 1  --  1    < > = values in this interval not displayed.       Coagulation Recent Labs     03/14/22  1120   PTP 12.8   INR 1.0 APTT 28.8       Iron/Ferritin Lab Results   Component Value Date/Time    Iron 35 (L) 03/15/2022 03:54 AM    TIBC 225 (L) 03/15/2022 03:54 AM    Iron % saturation 16 (L) 03/15/2022 03:54 AM    Ferritin 501 (H) 03/15/2022 03:54 AM       BNP    Cardiac Enzymes No results found for: CPK, RCK1, RCK2, RCK3, RCK4, CKMB, CKNDX, CKND1, TROPT, TROIQ, BNPP, BNP     Lactic Acid    Thyroid Studies          All Micro Results     None            Images:    CT (Most Recent). CT Results (most recent):  Results from Hospital Encounter encounter on 03/14/22    CTA NECK    Narrative  EXAM: CTA NECK    CLINICAL INDICATIONS: carotid stenosis ; collapsed at home; syncope versus  seizure    TECHNIQUE: Thin section CT scan of the neck performed during rapid IV bolus  contrast administration. These data were reconstructed for vascular analysis. 3D post processed images, including surface shaded displays, were produced for  this exam on independent console, permanently archived and interpreted. All CT scans at this facility are performed using dose optimization technique as  appropriate to a performed exam, to include automated exposure control,  adjustment of the MA and/or kV according to patient size (including appropriate  matching for site-specific examinations) or use of  iterative reconstruction  technique. COMPARISON: Recent cervical spine CT; prior CTA April 2021    FINDINGS:    CTA SOFT TISSUE ANALYSIS:  Lungs: Multiple areas of reticular nodularity within the upper lungs, more so on  the right. There is some associated bronchial wall thickening. Most sizable  nodular component is within the right upper lobe, measuring about 6 mm. Reference axial image 12. Additional same size nodule within superior segment  right lower lobe on image 9. These findings are generally seen previously.   Upper chest: Unremarkable  Neck:  Again demonstrated are multiple clustered metallic densities along the  posterior margin of the right side of the mandible. Lymph nodes: No adenopathy  Orbits: Unremarkable  Paranasal sinuses: Clear  Brain: No gross hemorrhage or mass effect. Bones: Multilevel degenerative findings recently described on trauma or  malignancy spine CT. Skin staples at right occipital scalp laceration. CTA NECK VASCULAR ANALYSIS:    Aortic arch: Mild atherosclerosis    Inominate: Mild atherosclerosis    Right Subclavian: Patent. Left Subclavian: Patent. Right carotid:  -CCA: Distal atherosclerosis without significant stenosis. -ECA: Mild proximal atherosclerosis, patent  -ICA: Mild to moderate atherosclerosis. There is an estimated 30% stenosis of  proximal ICA by NASCET criteria.]    Left carotid:  -CCA: Patent. -ECA: Patent. -ICA: Moderate atherosclerosis. There is an estimated 60-70% stenosis of  proximal ICA by NASCET criteria.]    Right vertebral: Patent. Left vertebral: Patent. Lower intracranial imaging shows no mass effect. Mild cavernous segment  atherosclerosis bilaterally. Patent bilateral AKILA and MCA. Patent  vertebrobasilar system. Impression  1. Bilateral proximal cervical carotid atherosclerosis  -On the left, this is potentially hemodynamically significant with 60-70%  stenosis    2. Reticulonodular findings within the lungs most characteristic of  postinfectious/postinflammatory sequelae. Some accompanying chronic morphology  bronchitis. XRAY (Most Recent)      EKG Results for orders placed or performed in visit on 05/23/12   AMB POC EKG ROUTINE W/ 12 LEADS, INTER & REP     Status: Abnormal    Impression    #1. Sinus bradycardia at 41 beats per minute. EKG is otherwise normal        2D ECHO 03/14/22    ECHO ADULT FOLLOW-UP OR LIMITED 03/15/2022 3/15/2022    Interpretation Summary    Left Ventricle: Left ventricle size is normal. Mildly increased wall thickness. Normal wall motion. Normal left ventricular systolic function with a visually estimated EF of 55 - 60%.     Aortic Valve: Mild transvalvular regurgitation. Signed by: Brandi Mackay MD on 3/15/2022  1:13 PM           Duplex of carotid:  Interpretation Summary    · Mild mixed density plaque at the bifurcation and internal carotid artery on the right, with less than 50% stenosis. · Moderate mixed density plaque at the bifurcation and internal carotid artery on the left, with 50-69% stenosis(note stenosis may be on the higher end of percentage). · No evidence of significant stenosis in the external carotid arteries. · Antegrade vertebral arteries bilaterally. · Normal velocities in the the subclavian arteries bilaterally.

## 2022-03-17 NOTE — PROGRESS NOTES
Cardiology Progress Note    Admit Date: 3/14/2022  Attending Cardiologist: Dr. Cecilio Cabrera  Discussed patient's care  at length with Gaston Nuñez PA-C. Would not restart diuretics at discharge. Patient hemodynamically stable without significant complaint. Agree with assessment and plan. See Cavanaugh MD  Assessment:     Hospital Problems  Date Reviewed: 3/16/2022          Codes Class Noted POA    * (Principal) Syncope and collapse ICD-10-CM: R55  ICD-9-CM: 780.2  3/14/2022 Unknown        Hypokalemia ICD-10-CM: E87.6  ICD-9-CM: 276.8  3/14/2022 Unknown        Acid-fast bacteria present ICD-10-CM: R89.9  ICD-9-CM: 795.39  6/30/2021 Yes        Hyperlipidemia LDL goal <130 ICD-10-CM: E78.5  ICD-9-CM: 272.4  3/23/2016 Yes              -Syncope, recurrent. Suspect orthostasis/hypotension in setting of below.  -Hypertension. Currently elevated but BP meds recently decreased as outpatient for low BP. -Chronic sinus bradycardia. No pauses or high grade AV block. PVCs and PACs noted. Will continue to follow on telemetry off AV gemma agents.   -Possible seizure, appreciate neurology evaluation.  -Carotid disease with moderate-severe disease, appreciate vascular involvement, started on ASA/plavix.  -Pancreatic mass for which outpatient evaluation was pending, appreciate GI evaluation. 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  -Right posterior scalp soft tissue contusion/hematoma. -Unintentional weight loss. -Hypokalemia. Replace as needed. -Hyponatremia. Continue to trend.  -Anemia. Continue to trend.  -Hyperlipidemia. No longer on statin due to intolerance.     Primary cardiologist Dr. Den Saleem     Echo 3/15/2022      Left Ventricle: Left ventricle size is normal. Mildly increased wall thickness. Normal wall motion. Normal left ventricular systolic function with a visually estimated EF of 55 - 60%.     Aortic Valve: Mild transvalvular regurgitation.     Nuclear stress 6/2021  · Baseline ECG: Normal sinus rhythm. · Stress test: Negative stress test.  · SPECT: Left ventricular function post-stress was normal. Calculated ejection fraction is 72%. There is no evidence of transient ischemic dilation (TID). The TID ratio is 0.89. · SPECT: Left ventricular perfusion is normal. Myocardial perfusion imaging supports a low risk stress test.       Plan:     BP elevated, low dose lisinopril resumed. Would not resume diuretics at this time. Will plan event monitor, placed this AM.  No further cardiac work up. Plan for discharge later today. Subjective:     No new complaints.      Objective:      Patient Vitals for the past 8 hrs:   Temp Pulse Resp BP SpO2   03/17/22 0656 97.9 °F (36.6 °C) 69 14 (!) 167/73 97 %   03/17/22 0402 98.4 °F (36.9 °C) 69 17 (!) 179/77 97 %         Patient Vitals for the past 96 hrs:   Weight   03/15/22 1059 96 lb (43.5 kg)   03/14/22 2251 96 lb 12.8 oz (43.9 kg)   03/14/22 1049 99 lb (44.9 kg)       TELE: normal sinus rhythm               Current Facility-Administered Medications   Medication Dose Route Frequency Last Admin    nitroglycerin (NITROBID) 2 % ointment 0.5 Inch  0.5 Inch Topical Q6H 0.5 Inch at 03/17/22 0625    lisinopriL (PRINIVIL, ZESTRIL) tablet 5 mg  5 mg Oral DAILY 5 mg at 03/17/22 0952    clopidogreL (PLAVIX) tablet 75 mg  75 mg Oral DAILY 75 mg at 03/17/22 5869    aspirin chewable tablet 81 mg  81 mg Oral DAILY 81 mg at 03/17/22 0952    potassium chloride (K-DUR, KLOR-CON M20) SR tablet 40 mEq  40 mEq Oral DAILY 40 mEq at 03/17/22 0952    famotidine (PEPCID) tablet 20 mg  20 mg Oral DAILY 20 mg at 03/17/22 0055    melatonin tablet 12 mg  12 mg Oral QHS PRN 12 mg at 03/16/22 2233    sodium chloride (NS) flush 5-40 mL  5-40 mL IntraVENous Q8H 10 mL at 03/17/22 0629    sodium chloride (NS) flush 5-40 mL  5-40 mL IntraVENous PRN      acetaminophen (TYLENOL) tablet 650 mg  650 mg Oral Q6H PRN      Or    acetaminophen (TYLENOL) suppository 650 mg  650 mg Rectal Q6H PRN      polyethylene glycol (MIRALAX) packet 17 g  17 g Oral DAILY PRN      ondansetron (ZOFRAN ODT) tablet 4 mg  4 mg Oral Q8H PRN      Or    ondansetron (ZOFRAN) injection 4 mg  4 mg IntraVENous Q6H PRN      enoxaparin (LOVENOX) injection 40 mg  40 mg SubCUTAneous DAILY 40 mg at 03/17/22 0952    azithromycin (ZITHROMAX) tablet 250 mg  250 mg Oral DAILY 250 mg at 03/17/22 0886    rifAMPin (RIFADIN) capsule 150 mg  150 mg Oral  mg at 03/17/22 5613         Intake/Output Summary (Last 24 hours) at 3/17/2022 1134  Last data filed at 3/17/2022 7819  Gross per 24 hour   Intake 220 ml   Output 1000 ml   Net -780 ml       Physical Exam:  General:  alert, cooperative, no distress, appears stated age  Neck:  no JVD  Lungs:  clear to auscultation bilaterally  Heart:  regular rate and rhythm  Abdomen:  abdomen is soft without significant tenderness, masses, organomegaly or guarding  Extremities:  extremities normal, atraumatic, no cyanosis or edema    Visit Vitals  BP (!) 167/73 (BP 1 Location: Left upper arm, BP Patient Position: At rest)   Pulse 69   Temp 97.9 °F (36.6 °C)   Resp 14   Ht 5' (1.524 m)   Wt 96 lb (43.5 kg)   SpO2 97%   Breastfeeding No   BMI 18.75 kg/m²       Data Review:     Labs: Results:       Chemistry Recent Labs     03/15/22  0354   *   *   K 3.1*   CL 98*   CO2 29   BUN 17   CREA 0.64   CA 8.2*   AGAP 5   BUCR 27*      CBC w/Diff Recent Labs     03/15/22  0354   WBC 3.4*   RBC 3.63*   HGB 10.9*   HCT 31.6*      GRANS 50   LYMPH 35   EOS 1      Cardiac Enzymes No results found for: CPK, CK, CKMMB, CKMB, RCK3, CKMBT, CKNDX, CKND1, JERZY, TROPT, TROIQ, SANTIAGO, TROPT, TNIPOC, BNP, BNPP   Coagulation No results for input(s): PTP, INR, APTT, INREXT in the last 72 hours.     Lipid Panel Lab Results   Component Value Date/Time    Cholesterol, total 229 (H) 11/05/2021 09:09 AM    HDL Cholesterol 60 11/05/2021 09:09 AM LDL, calculated 149.4 (H) 11/05/2021 09:09 AM    VLDL, calculated 19.6 11/05/2021 09:09 AM    Triglyceride 98 11/05/2021 09:09 AM    CHOL/HDL Ratio 3.8 11/05/2021 09:09 AM      BNP No results found for: BNP, BNPP, XBNPT   Liver Enzymes No results for input(s): TP, ALB, TBIL, AP in the last 72 hours.     No lab exists for component: SGOT, GPT, DBIL   Thyroid Studies Lab Results   Component Value Date/Time    TSH 3.26 03/14/2022 11:20 AM          Signed By: Sherryle Solomons, PA     March 17, 2022

## 2022-03-17 NOTE — DISCHARGE SUMMARY
Discharge Summary    Patient: Mica Vigil MRN: [id number]  CSN: [id number]    YOB: 1940  Age: [de-identified] y.o. Sex: female    DOA: 3/14/2022 LOS:  LOS: 3 days   Discharge Date:      Admission Diagnosis: Syncope and collapse [R55]  Hypokalemia [E87.6]    Discharge Diagnosis:    Hospital Problems  Date Reviewed: 3/16/2022          Codes Class Noted POA    Hygroma ICD-10-CM: D18.1  ICD-9-CM: 228.1  3/17/2022 Unknown        WESLEY (mycobacterium avium-intracellulare) ([facility] [address] ICD-10-CM: A31.0  ICD-9-CM: 031.0  3/17/2022 Unknown        * (Principal) Syncope and collapse ICD-10-CM: R55  ICD-9-CM: 780.2  3/14/2022 Unknown        Hypokalemia ICD-10-CM: E87.6  ICD-9-CM: 276.8  3/14/2022 Unknown        Hyperlipidemia LDL goal <130 ICD-10-CM: E78.5  ICD-9-CM: 272.4  3/23/2016 Yes        Essential hypertension ICD-10-CM: I10  ICD-9-CM: 401.9  4/20/2015 Yes              Discharge Condition: Stable    PHYSICAL EXAM  Visit Vitals  BP (!) 167/73 (BP 1 Location: Left upper arm, BP Patient Position: At rest)   Pulse 69   Temp 97.9 °F (36.6 °C)   Resp 14   Ht 5' (1.524 m)   Wt 43.5 kg (96 lb)   SpO2 97%   Breastfeeding No   BMI 18.75 kg/m²     General:  Cooperative, Not in acute distress, speaks in full sentence while in bed  HEENT: PERRL, EOMI, supple neck, no JVD, dry oral mucosa. Head with staples. Cardiovascular: S1S2 regular, no rub/gallop   Pulmonary: air entry bilaterally, no wheezing, no crackle  GI:  Soft, non tender, non distended, +bs, no guarding   Extremities:  No pedal edema, +distal pulses appreciated   Neuro: AOx3, moving all extremities, no gross deficit. Hospital Course By Problem:   1. Syncope with collapse - likely secondary to orthostatic hypotension   2. Jerking/shaking observed by daughter at home while she was down, this could possibly be convulsive syncope. Doubt that this was seizure disorder. 3. 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 Neurology consulted.  Suggested interval CT to check stability of hygroma. Hygroma likely secondary to trauma from falling. CT ordered, results to be included when available. Cardiology consulted - echo without any abnormalities. Lisinopril home dose decreased to 5 mg, held HCTZ. 4.  Chronic bradycardia, no prolonged pause on telemetry               #4. No intervention needed. Cardiology (Dr. Corine Lopes) to arrange 14 day event monitor at discharge. 5.  Left ICA stenosis, 50-69%,  Right ICA stenosis <50%               #4. Vascular consulted. Started on ASA, Plavix. 6.  Right posterior scalp soft tissue laceration, s/p repair               #6. Closed with 8 staples on 3/14/22. Will need removal in approximately 1 week. 7.  Hypertension               #7. Stable on lisinopril 5 mg.   8.   Hypokalemia, hyponatremia              #8. Potassium orally repleted. Minor hyponatremia at 132.   9.   Mycobacterium avium infection, on chronic Azithromycin and Rifabutin               #8. Infectious disease consulted. Patient on Rifabutin at home. Ethambutol to be added on discharge. 10.   Pancreatic lesion with interval size change               #10. GI consulted. No inpatient workup recommended. EGD for prognostic purposes to be completed outpatient with Dr. Patricio Hoffman if family wishes to pursue. 11.  unintentional weight loss, 8 lbs               #11. Patient with history of food restricting secondary to health fears. Although, without definitive diagnosis of pancreatic lesion, cannot rule out catabolic process. Could also be associated with WESLEY. 12.  Normocytic anemia   13. Leukopenia               #12-13. No intervention needed. Patient did not require transfusion. Baseline Hgb appears to around 12.5. Consults:   Infectious disease  Cardiology  GI    Significant Diagnostic Studies:   Echo (3/15/22): Left Ventricle Left ventricle size is normal. Mildly increased wall thickness. Septal motion is consistent with bundle branch block.  Normal wall motion. Normal left ventricular systolic function with a visually estimated EF of 55 - 60%. Right Ventricle Right ventricle size is normal. Normal systolic function. Aortic Valve Thickened cusp. Mild transvalvular regurgitation. No stenosis. Mitral Valve Trace transvalvular regurgitation. Tricuspid Valve Trace transvalvular regurgitation. Pulmonary Artery Pulmonary hypertension not present. IVC/Hepatic Veins IVC diameter is less than or equal to 21 mm and decreases greater than 50% during inspiration; therefore the estimated right atrial pressure is normal (~3 mmHg). IVC size is normal.   Pericardium The pericardium is normal. No pericardial effusion. CTA neck - 3/15/22  1. Bilateral proximal cervical carotid atherosclerosis   -On the left, this is potentially hemodynamically significant with 60-70%  stenosis     2. Reticulonodular findings within the lungs most characteristic of  postinfectious/postinflammatory sequelae. Some accompanying chronic morphology  Bronchitis. CT head without contrast - 3/14/22     1. Right posterior scalp soft tissue contusion/hematoma. 2. No evidence of acute intracranial abnormality. 3. Mild burden of periventricular white matter presumed chronic microvascular  disease. MRI brain - 3/16/22  1. Interval development of a thin caliber CSF hygroma along the right cerebrum  -No associated hemorrhage  -No associated mass effect  -There is overlying thin dural enhancement on this affected side  -This is also the side of occipital scalp laceration  -No evidence within chart of interval lumbar puncture  -Local CSF leak presumably from the injury is presumed. No concerning features.      2.  Advise short interval follow-up head CT to ensure no increase in caliber or  mass effect from this hygroma.     Duplex carotid bilaterally (3/15/22)  · Mild mixed density plaque at the bifurcation and internal carotid artery on the right, with less than 50% stenosis. · Moderate mixed density plaque at the bifurcation and internal carotid artery on the left, with 50-69% stenosis(note stenosis may be on the higher end of percentage). · No evidence of significant stenosis in the external carotid arteries. · Antegrade vertebral arteries bilaterally. · Normal velocities in the the subclavian arteries bilaterally. CT head 3/17/22  IMPRESSION  1. No acute intracranial hemorrhage, mass effect or midline structural shift. 2. Unchanged small right hygroma. Discharge Medications:     Current Discharge Medication List      START taking these medications    Details   !! ethambutoL (MYAMBUTOL) 400 mg tablet Take 1 Tablet by mouth daily. Qty: 30 Tablet, Refills: 0  Start date: 3/17/2022      !! ethambutoL (MYAMBUTOL) 100 mg tablet Take 2 Tablets by mouth daily. Qty: 60 Tablet, Refills: 0  Start date: 3/17/2022      clopidogreL (PLAVIX) 75 mg tab Take 1 Tablet by mouth daily. Qty: 30 Tablet, Refills: 1  Start date: 3/18/2022      lisinopriL (PRINIVIL, ZESTRIL) 5 mg tablet Take 1 Tablet by mouth daily. Qty: 30 Tablet, Refills: 0  Start date: 3/18/2022      levETIRAcetam (Keppra) 250 mg tablet Take 1 Tablet by mouth two (2) times a day. Qty: 60 Tablet, Refills: 0  Start date: 3/14/2022      potassium chloride SR (K-TAB) 20 mEq tablet Take 2 Tablets by mouth daily. Qty: 10 Tablet, Refills: 0  Start date: 3/14/2022       !! - Potential duplicate medications found. Please discuss with provider. CONTINUE these medications which have NOT CHANGED    Details   timolol (TIMOPTIC) 0.5 % ophthalmic solution Administer 1 Drop to left eye two (2) times a day. rifabutin (MYCOBUTIN) 150 mg capsule rifabutin 150 mg capsule   Take 1 capsule every day by oral route. azithromycin (ZITHROMAX) 250 mg tablet Take 250 mg by mouth daily. aspirin delayed-release 81 mg tablet Take 1 Tablet by mouth daily.   Qty: 100 Tablet, Refills: 3      neomycin-polymyxin-dexamethasone (DEXACINE) 3.5 mg/g-10,000 unit/g-0.1 % ophthalmic ointment       psyllium husk (DAILY FIBER PO) Take  by mouth. omeprazole (PRILOSEC) 20 mg capsule Take 20 mg by mouth daily. cyanocobalamin (Vitamin B-12) 1,000 mcg tablet Take 1,000 mcg by mouth daily. folic acid/multivit-min/lutein (CENTRUM SILVER PO) Take  by mouth.      bimatoprost (LUMIGAN) 0.03 % ophthalmic drops Administer 1 Drop to both eyes every evening. CALCIUM CARBONATE/VITAMIN D3 (CALCIUM WITH VITAMIN D PO) Take  by mouth.              Activity: activity as tolerated    Diet: Regular Diet    Wound Care: None needed    Follow-up: with PCP, Latesha Roa MD in 7-10days    Minutes spent on discharge: >30 minutes spent coordinating this discharge (review instructions/follow-up, prescriptions, preparing report for sign off)    Alfredo Corona DO  03/17/22  12:54 PM

## 2022-03-17 NOTE — PROGRESS NOTES
[facility]  (A Division of [address] [place] [address])    Follow-up Note      Date of Admission: 3/14/2022       Date of Note:  3/17/2022          Current Antimicrobials:    Prior Antimicrobials:  Azithromycin   Rifabitin      Assessment:           Pulmonary WESLEY-followed by Dr. Aniya Guardado. bronchoscopy 5/19/21 with AFB cultures + x 2 for WESLEY, AFB smears were negative. Started on Azithro + Rif, pending initiation on Ethambutol. Current chest x-ray without significant infiltrates. Recurrent syncope and falls:  Suspect related to orthostatics. Further w/u in progress  Bradycardia:  Cardiology following. Unlikley related to azithromycin as not documented QT prolongation. 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 GI following- Suggests EGD? EUS  Unexplained weight loss:  ? Related to WESLEY vs pancreatic process. Electrolyte abnormalities. Iron deficiency anemia    Plan:   Continue with Azithromycin and Rifampin (on Rifabutin at home). Ethambutol to be added after discharge. - script written. Can  when ready. Family and patient advised on side effects including loss of color vision, elevation of LFT and potential drug interactions. Will need periodic EKG during therapy to monitor for QT prolongation, especially given recent bradycardia. CBC, CMP to monitor for drug associated toxicities. Will continue to follow. Happy to see in OP ID clinic as well. Discussed with Dr. Melida Murry this am     DO [facility]  [address] [facility]  Office: [phone]  Mobile/Text: [phone]     Microbiology:  none    Lines / Catheters:  peripehral    Subjective:   Patient seen and examined at bedside. Daughter is present. Very anxious to go home today. No particular complaints otherwise. No further dizziness.     Objective:        Visit Vitals  BP (!) 167/73 (BP 1 Location: Left upper arm, BP Patient Position: At rest)   Pulse 69   Temp 97.9 °F (36.6 °C)   Resp 14   Ht 5' (1.524 m)   Wt 43.5 kg (96 lb)   SpO2 97%   Breastfeeding No   BMI 18.75 kg/m²     Temp (24hrs), Av.1 °F (36.7 °C), Min:97.9 °F (36.6 °C), Max:98.4 °F (36.9 °C)        General:   awake alert and oriented, non-toxic, thin   Skin:   no rashes or skin lesions noted on limited exam, dry and warm; laceration on her scalp crusted over and without further bleeding   HEENT:  No scleral icterus or pallor; oral mucosa moist, lips moist   Lymph Nodes:   not assessed today   Lungs:   non, labored; bilaterally clear to aspiration- no crackles wheezes rales or rhonchi   Heart:  RRR, s1 and s2; no murmurs rubs or gallops; no edema, + pedal pulses   Abdomen:  soft, non-distended, active bowel sounds, non-tender   Genitourinary:  deferred   Extremities:   average muscle tone; no contractures, no joint effusions   Neurologic:  No gross focal motor or sensory abnormalities; CN 2-12 intact; Follows commands.     Psychiatric:   calm, seems depressed         Lab results:    Chemistry  Recent Labs     03/15/22  0354 22  1120   * 160*   * 134*   K 3.1* 3.4*   CL 98* 95*   CO2 29 33*   BUN 17 16   CREA 0.64 0.78   CA 8.2* 8.3*   AGAP 5 6   BUCR 27* 21*   AP  --  61   TP  --  6.8   ALB  --  3.2*   GLOB  --  3.6   AGRAT  --  0.9       CBC w/ Diff  Recent Labs     03/15/22  0354 22  1120   WBC 3.4* 5.4   RBC 3.63* 4.05*   HGB 10.9* 12.5   HCT 31.6* 35.6    157   GRANS 50 73   LYMPH 35 17*   EOS 1 1       Microbiology  All Micro Results     None           Santosh Hernandes DO   3/17/2022

## 2022-03-17 NOTE — TELEPHONE ENCOUNTER
----- Message from Cecivernonywa Patiño sent at 3/17/2022  3:48 PM EDT -----  Subject: Appointment Request    Reason for Call: Routine Hospital Follow Up    QUESTIONS  Type of Appointment? Established Patient  Reason for appointment request? No appointments available during search  Additional Information for Provider? Savana f/u discharged 3/17/22 / suture removal / screen green Please call her daughter Zabrina Hill to schedule     ---------------------------------------------------------------------------  --------------  CALL BACK INFO  What is the best way for the office to contact you? OK to leave message on   voicemail  Preferred Call Back Phone Number? [phone]  ---------------------------------------------------------------------------  --------------  SCRIPT ANSWERS  Relationship to Patient? Other  Representative Name? Jayesh Mccrary  Additional information verified (besides Name and Date of Birth)? Address  (Patient requests to see provider urgently. )? No  (Has the patient been discharged from the hospital within 2 business days   AND does not have a Telephone Encounter - Follow Up From Hospital   documented in [address]?)? No  Do you have any questions for your primary care provider that need to be   answered prior to your appointment? (Use RN Triage if question pertains to   anything on the red flag list)? No  (Patient needs follow up visit after hospital discharge) Book first   available appointment within 7 days OF DISCHARGE, if no appt, proceed to   book the next available time slot within 14 days OF DISCHARGE AND Send   Message to Provider. [facility] Follow Up appointment cannot be booked   beyond 14 Days and should result in a Message to Provider. ? Yes   Have you been diagnosed with, awaiting test results for, or told that you   are suspected of having COVID-19 (Coronavirus)? (If patient has tested   negative or was tested as a requirement for work, school, or travel and   not based on symptoms, answer no)? No  Within the past 10 days have you developed any of the following symptoms   (answer no if symptoms have been present longer than 10 days or began   more than 10 days ago)? Fever or Chills, Cough, Shortness of breath or   difficulty breathing, Loss of taste or smell, Sore throat, Nasal   congestion, Sneezing or runny nose, Fatigue or generalized body aches   (answer no if pain is specific to a body part e.g. back pain), Diarrhea,   Headache? No  Have you had close contact with someone with COVID-19 in the last 7 days? No  (Service Expert - click yes below to proceed with [employer] As Usual   Scheduling)?  Yes

## 2022-03-17 NOTE — PROGRESS NOTES
Pt not seen for skilled PT due to:    []  Nausea/vomiting  []  Eating  []  Pain  []  Pt lethargic  []  Off Unit  Other: Pt just returned to room from being off the floor, kindly requested to come back later for PT. Will f/u later as schedule allows. Thank you.   Elmira Cummins, PT, DPT

## 2022-03-17 NOTE — CONSULTS
Starr Byram    Chief Complaint   Patient presents with    Fall    Head Injury    Head Laceration       History and Physical    81-year-old female admitted to the hospital with syncope felt to be orthostatic or cardiac related. Incidental finding of carotid stenosis see Doppler and CTA below. Patient does have any focal signs of TIA or stroke. No weakness on one side or the other. No visual disturbance no word searching.   History of hyperlipidemia hypertension non-smoker not on dialysis nondiabetic    Past Medical History:   Diagnosis Date    Diverticulosis of sigmoid colon 11/13/15    mild; Dr. Arielle Poon Eczema     Elevated cholesterol     Family history of diabetes mellitus (DM)     Glaucoma     Hyperlipidemia     Hypertension     Indigestion     Interpolated PVCs      Patient Active Problem List   Diagnosis Code    Eczema L30.9    Glaucoma H40.9    Generalized osteoarthrosis, involving multiple sites M15.9    Essential hypertension I10    Hyperlipidemia LDL goal <130 E78.5    Raynaud's disease without gangrene I73.00    Impaired fasting glucose R73.01    Nonrheumatic aortic valve insufficiency I35.1    Acid-fast bacteria present R89.9    Chronic constipation K59.09    Syncope and collapse R55    Hypokalemia E87.6     Past Surgical History:   Procedure Laterality Date    HX BREAST BIOPSY      HX HYSTERECTOMY  1981    STEPHAN/BSO    HX OOPHORECTOMY      right     Current Facility-Administered Medications   Medication Dose Route Frequency Provider Last Rate Last Admin    nitroglycerin (NITROBID) 2 % ointment 0.5 Inch  0.5 Inch Topical Q6H Jeancarlos Anguiano,    0.5 Inch at 03/17/22 0625    lisinopriL (PRINIVIL, ZESTRIL) tablet 5 mg  5 mg Oral DAILY Joleen Pozo, PA        clopidogreL (PLAVIX) tablet 75 mg  75 mg Oral DAILY Tanya Little N, DO        aspirin chewable tablet 81 mg  81 mg Oral DAILY Tanya Little N, DO        potassium chloride (K-DUR, KLOR-CON M20) SR tablet 40 mEq  40 mEq Oral DAILY Tanya Little N, DO        famotidine (PEPCID) tablet 20 mg  20 mg Oral DAILY Angelique Agustin NP   20 mg at 03/16/22 0094    melatonin tablet 12 mg  12 mg Oral QHS PRN Wilber Cotto DO   12 mg at 03/16/22 2233    sodium chloride (NS) flush 5-40 mL  5-40 mL IntraVENous Q8H Angelique Agustin NP   10 mL at 03/17/22 8717    sodium chloride (NS) flush 5-40 mL  5-40 mL IntraVENous PRN Jimmy Agustin NP        acetaminophen (TYLENOL) tablet 650 mg  650 mg Oral Q6H PRN Jimmy Agustin NP        Or    acetaminophen (TYLENOL) suppository 650 mg  650 mg Rectal Q6H PRN Angelique Agustin NP        polyethylene glycol (MIRALAX) packet 17 g  17 g Oral DAILY PRN Jimmy Agustin NP        ondansetron (ZOFRAN ODT) tablet 4 mg  4 mg Oral Q8H PRN Angelique Agustin NP        Or    ondansetron (ZOFRAN) injection 4 mg  4 mg IntraVENous Q6H PRN Angelique Agustin NP        enoxaparin (LOVENOX) injection 40 mg  40 mg SubCUTAneous DAILY Angelique Agustin NP   40 mg at 03/16/22 0813    azithromycin (ZITHROMAX) tablet 250 mg  250 mg Oral DAILY Jimmy Agustin NP   250 mg at 03/16/22 8163    rifAMPin (RIFADIN) capsule 150 mg  150 mg Oral ACB Angelique Agustin NP   150 mg at 03/17/22 9465     Allergies   Allergen Reactions    Pravastatin Other (comments)     Arthralgias/myalgias       Review of Systems    A full review of systems was completed times ten organ systems and was deemed negative unless otherwise mentioned in the HPI.     Physical   Visit Vitals  BP (!) 167/73 (BP 1 Location: Left upper arm, BP Patient Position: At rest)   Pulse 69   Temp 97.9 °F (36.6 °C)   Resp 14   Ht 5' (1.524 m)   Wt 43.5 kg (96 lb)   SpO2 97%   Breastfeeding No   BMI 18.75 kg/m²       Alert answers questions appropriately  Head is normocephalic and recent fall with incision and sutures notable  Neck no JVD  Chest is clear  Cardiac is regular  Abdomen soft nondistended  Extremities are warm no signs of acute arterial deficit  Range of motion strength seem equal      Impression/Plan:   70% carotid stenosis left side  Recommend double antiplatelet therapy if tolerates  Agree with neurology evaluation, opinion on hygroma  No indication for urgent carotid intervention, does not seem to be symptomatic  We will follow along  Will assess functionality going forward and discuss with family      MD Royal    PLEASE NOTE:  This document has been produced using voice recognition software. Unrecognized errors in transcription may be present.

## 2022-03-18 PROBLEM — R55 SYNCOPE AND COLLAPSE: Status: ACTIVE | Noted: 2022-03-14

## 2022-03-18 PROBLEM — I73.00 RAYNAUD'S DISEASE WITHOUT GANGRENE: Status: ACTIVE | Noted: 2017-05-15

## 2022-03-18 PROBLEM — R89.9 ACID-FAST BACTERIA PRESENT: Status: ACTIVE | Noted: 2021-06-30

## 2022-03-18 PROBLEM — R73.01 IMPAIRED FASTING GLUCOSE: Status: ACTIVE | Noted: 2018-02-21

## 2022-03-18 NOTE — DISCHARGE INSTRUCTIONS
Patient Education        Dehydration: Care Instructions  Your Care Instructions  Dehydration happens when your body loses too much fluid. This might happen when you do not drink enough water or you lose large amounts of fluids from your body because of diarrhea, vomiting, or sweating. Severe dehydration can be life-threatening. Water and minerals called electrolytes help put your body fluids back in balance. Learn the early signs of fluid loss, and drink more fluids to prevent dehydration. Follow-up care is a key part of your treatment and safety. Be sure to make and go to all appointments, and call your doctor if you are having problems. It's also a good idea to know your test results and keep a list of the medicines you take. How can you care for yourself at home? · Drink plenty of fluids. Choose water and other clear liquids until you feel better. If you have kidney, heart, or liver disease and have to limit fluids, talk with your doctor before you increase the amount of fluids you drink. · If you do not feel like eating or drinking, try taking small sips of water, sports drinks, or other rehydration drinks. · Get plenty of rest.  To prevent dehydration  · Add more fluids to your diet and daily routine, unless your doctor has told you not to. · During hot weather, drink more fluids. Drink even more fluids if you exercise a lot. Stay away from drinks with alcohol. · Watch for the symptoms of dehydration. These include:  ? A dry, sticky mouth. ? Not much urine. ? Dry and sunken eyes. ? Feeling very tired. · Learn what problems can lead to dehydration. These include:  ? Diarrhea, fever, and vomiting. ? Any illness with a fever, such as pneumonia or the flu. ? Activities that cause heavy sweating, such as endurance races and heavy outdoor work in hot or humid weather. ?  Certain medicines, such as cold and allergy pills (antihistamines), pills that remove water from the body (diuretics), and laxatives. ? Certain diseases, such as diabetes, cancer, and heart or kidney disease. When should you call for help? Call 911 anytime you think you may need emergency care. For example, call if:    · You passed out (lost consciousness). Call your doctor now or seek immediate medical care if:    · You are confused and cannot think clearly.     · You are dizzy or lightheaded, or you feel like you may faint.     · You have signs of needing more fluids. You have sunken eyes, a dry mouth, and you pass only a little urine.     · You cannot keep fluids down.     · You have diarrhea that lasts for more than a few days. Watch closely for changes in your health, and be sure to contact your doctor if:    · You are not making tears.     · Your skin is very dry and sags slowly back into place after you pinch it.     · Your mouth and eyes are very dry. Where can you learn more? Go to http://www.gray.com/  Enter Q814 in the search box to learn more about \"Dehydration: Care Instructions. \"  Current as of: July 1, 2021               Content Version: 13.2  © 0698-2217 [employer]. Care instructions adapted under license by [employer] (which disclaims liability or warranty for this information). If you have questions about a medical condition or this instruction, always ask your healthcare professional. [place] [address] any warranty or liability for your use of this information. Patient Education        Fainting: Care Instructions  Your Care Instructions     When you faint, or pass out, you lose consciousness for a short time. A brief drop in blood flow to the brain often causes it. When you fall or lie down, more blood flows to your brain and you regain consciousness. Emotional stress, pain, or overheating--especially if you have been standing--can make you faint. In these cases, fainting is usually not serious.  But fainting can be a sign of a more serious problem. Your doctor may want you to have more tests to rule out other causes. The treatment you need depends on the reason why you fainted. The doctor has checked you carefully, but problems can develop later. If you notice any problems or new symptoms, get medical treatment right away. Follow-up care is a key part of your treatment and safety. Be sure to make and go to all appointments, and call your doctor if you are having problems. It's also a good idea to know your test results and keep a list of the medicines you take. How can you care for yourself at home? · Drink plenty of fluids to prevent dehydration. If you have kidney, heart, or liver disease and have to limit fluids, talk with your doctor before you increase your fluid intake. When should you call for help? Call 911 anytime you think you may need emergency care. For example, call if:    · You have symptoms of a heart problem. These may include:  ? Chest pain or pressure. ? Severe trouble breathing. ? A fast or irregular heartbeat. ? Lightheadedness or sudden weakness. ? Coughing up pink, foamy mucus. ? Passing out. After you call 911, the  may tell you to chew 1 adult-strength or 2 to 4 low-dose aspirin. Wait for an ambulance. Do not try to drive yourself.     · You have symptoms of a stroke. These may include:  ? Sudden numbness, tingling, weakness, or loss of movement in your face, arm, or leg, especially on only one side of your body. ? Sudden vision changes. ? Sudden trouble speaking. ? Sudden confusion or trouble understanding simple statements. ? Sudden problems with walking or balance. ? A sudden, severe headache that is different from past headaches.     · You passed out (lost consciousness) again. Watch closely for changes in your health, and be sure to contact your doctor if:    · You do not get better as expected. Where can you learn more?   Go to http://www.gray.com/  Enter E636 in the search box to learn more about \"Fainting: Care Instructions. \"  Current as of: July 1, 2021               Content Version: 13.2  © 2006-2022 [employer]. Care instructions adapted under license by [employer] (which disclaims liability or warranty for this information). If you have questions about a medical condition or this instruction, always ask your healthcare professional. [place] [address] any warranty or liability for your use of this information. Patient Education        Learning About a Closed Head Injury  What is a closed head injury? A closed head injury happens when your head gets hit hard. The strong force of the blow causes your brain to shake in your skull. This movement can cause the brain to bruise, swell, or tear. Sometimes nerves or blood vessels also get damaged. This can cause bleeding in or around the brain. A concussion is a type of closed head injury. What are the symptoms? If you have a mild concussion, you may have a mild headache or feel \"not quite right. \" These symptoms are common. They usually go away over a few days to 4 weeks. But sometimes after a concussion, you feel like you can't function as well as before the injury. And you have new symptoms. This is called postconcussive syndrome. You may:  · Find it harder to solve problems, think, concentrate, or remember. · Have headaches. · Have changes in your sleep patterns, such as not being able to sleep or sleeping all the time. · Have changes in your personality. · Not be interested in your usual activities. · Feel angry or anxious without a clear reason. · Lose your sense of taste or smell. · Be dizzy, lightheaded, or unsteady. It may be hard to stand or walk. How is a closed head injury treated? Any person who may have a concussion needs to see a doctor.  Some people have to stay in the hospital to be watched. Others can go home safely. If you go home, follow your doctor's instructions. He or she will tell you if you need someone to watch you closely for the next 24 hours or longer. Rest is the best treatment. Get plenty of sleep at night. And try to rest during the day. · Avoid activities that are physically or mentally demanding. These include housework, exercise, and schoolwork. And don't play video games, send text messages, or use the computer. You may need to change your school or work schedule to be able to avoid these activities. · Ask your doctor when it's okay to drive, ride a bike, or operate machinery. · Take an over-the-counter pain medicine, such as acetaminophen (Tylenol), ibuprofen (Advil, Motrin), or naproxen (Aleve). Be safe with medicines. Read and follow all instructions on the label. · Check with your doctor before you use any other medicines for pain. · Do not drink alcohol or use illegal drugs. They can slow recovery. They can also increase your risk of getting a second head injury. Follow-up care is a key part of your treatment and safety. Be sure to make and go to all appointments, and call your doctor if you are having problems. It's also a good idea to know your test results and keep a list of the medicines you take. Where can you learn more? Go to http://www.gray.com/  Enter E235 in the search box to learn more about \"Learning About a Closed Head Injury. \"  Current as of: December 13, 2021               Content Version: 13.2  © 2006-2022 Healthwise, [facility]. Care instructions adapted under license by [employer] (which disclaims liability or warranty for this information). If you have questions about a medical condition or this instruction, always ask your healthcare professional. [place] [address] any warranty or liability for your use of this information.          Patient Education        Vasovagal Syncope: Care Instructions  Your Care Instructions     Vasovagal syncope (say \"qqn-qfo-EHFSebastian BOTELLO-kuh-pee\")is sudden dizziness or fainting that can be set off by things such as pain, stress, fear, or trauma. You may sweat or feel lightheaded, sick to your stomach, or tingly. The problem causes the heart rate to slow and the blood vessels to widen, or dilate, for a short time. When this happens, blood pools in the lower body, and less blood goes to the brain. You can usually get relief by lying down with your legs raised (elevated). This helps more blood to flow to your brain and may help relieve symptoms like feeling dizzy. Some doctors may recommend a technique that involves tensing your fists and arms. This type of fainting is often easy to predict. For example, it happens to some people when they see blood or have to get a shot. They may feel symptoms before they faint. An episode of vasovagal syncope usually responds well to self-care. Other treatment often isn't needed. But if the fainting keeps happening, your doctor may suggest further treatments. Follow-up care is a key part of your treatment and safety. Be sure to make and go to all appointments, and call your doctor if you are having problems. It's also a good idea to know your test results and keep a list of the medicines you take. How can you care for yourself at home? · Drink plenty of fluids to prevent dehydration. If you have kidney, heart, or liver disease and have to limit fluids, talk with your doctor before you increase your fluid intake. · Try to avoid things that you think may set off vasovagal syncope. · Talk to your doctor about any medicines you take. Some medicines may increase the chance of this condition occurring. · If you feel symptoms, lie down with your legs raised. Talk to your doctor about what to do if your symptoms come back. When should you call for help? Call 911 anytime you think you may need emergency care.  For example, call if:    · You have symptoms of a heart problem. These may include:  ? Chest pain or pressure. ? Severe trouble breathing. ? A fast or irregular heartbeat. Watch closely for changes in your health, and be sure to contact your doctor if:    · You have more episodes of fainting at home.     · You do not get better as expected. Where can you learn more? Go to http://www.gray.com/  Enter L754 in the search box to learn more about \"Vasovagal Syncope: Care Instructions. \"  Current as of: July 1, 2021               Content Version: 13.2  © 3800-0181 [employer]. Care instructions adapted under license by [employer] (which disclaims liability or warranty for this information). If you have questions about a medical condition or this instruction, always ask your healthcare professional. [address] any warranty or liability for your use of this information. DISCHARGE SUMMARY from Nurse    PATIENT INSTRUCTIONS:    After general anesthesia or intravenous sedation, for 24 hours or while taking prescription Narcotics:  · Limit your activities  · Do not drive and operate hazardous machinery  · Do not make important personal or business decisions  · Do  not drink alcoholic beverages  · If you have not urinated within 8 hours after discharge, please contact your surgeon on call.     Report the following to your surgeon:  · Excessive pain, swelling, redness or odor of or around the surgical area  · Temperature over 100.5  · Nausea and vomiting lasting longer than 4 hours or if unable to take medications  · Any signs of decreased circulation or nerve impairment to extremity: change in color, persistent  numbness, tingling, coldness or increase pain  · Any questions    What to do at Home:  Recommended activity: Activity as tolerated, rest as needed     If you experience any of the following symptoms Worsening shortness of breath, Chest pain, Dizziness, Fainting please follow up with ***. *  Please give a list of your current medications to your Primary Care Provider. *  Please update this list whenever your medications are discontinued, doses are      changed, or new medications (including over-the-counter products) are added. *  Please carry medication information at all times in case of emergency situations. These are general instructions for a healthy lifestyle:    No smoking/ No tobacco products/ Avoid exposure to second hand smoke  Surgeon General's Warning:  Quitting smoking now greatly reduces serious risk to your health. Obesity, smoking, and sedentary lifestyle greatly increases your risk for illness    A healthy diet, regular physical exercise & weight monitoring are important for maintaining a healthy lifestyle    You may be retaining fluid if you have a history of heart failure or if you experience any of the following symptoms:  Weight gain of 3 pounds or more overnight or 5 pounds in a week, increased swelling in our hands or feet or shortness of breath while lying flat in bed. Please call your doctor as soon as you notice any of these symptoms; do not wait until your next office visit. The discharge information has been reviewed with the {PATIENT PARENT GUARDIAN:26295}. The {PATIENT PARENT GUARDIAN:86453} verbalized understanding. Discharge medications reviewed with the {Dishcarge meds reviewed HHDR:22250} and appropriate educational materials and side effects teaching were provided.   ___________________________________________________________________________________________________________________________________

## 2022-03-19 PROBLEM — K59.09 CHRONIC CONSTIPATION: Status: ACTIVE | Noted: 2021-06-30

## 2022-03-19 PROBLEM — E87.6 HYPOKALEMIA: Status: ACTIVE | Noted: 2022-03-14

## 2022-03-19 PROBLEM — I35.1 NONRHEUMATIC AORTIC VALVE INSUFFICIENCY: Status: ACTIVE | Noted: 2021-06-16

## 2022-03-21 ENCOUNTER — HOSPITAL ENCOUNTER (EMERGENCY)
Age: 82
Discharge: HOME | End: 2022-03-22
Attending: STUDENT IN AN ORGANIZED HEALTH CARE EDUCATION/TRAINING PROGRAM
Payer: MEDICARE

## 2022-03-21 ENCOUNTER — APPOINTMENT (OUTPATIENT)
Dept: GENERAL RADIOLOGY | Age: 82
End: 2022-03-21
Attending: STUDENT IN AN ORGANIZED HEALTH CARE EDUCATION/TRAINING PROGRAM
Payer: MEDICARE

## 2022-03-21 ENCOUNTER — TELEPHONE (OUTPATIENT)
Dept: INTERNAL MEDICINE CLINIC | Age: 82
End: 2022-03-21

## 2022-03-21 ENCOUNTER — APPOINTMENT (OUTPATIENT)
Dept: CT IMAGING | Age: 82
End: 2022-03-21
Attending: STUDENT IN AN ORGANIZED HEALTH CARE EDUCATION/TRAINING PROGRAM
Payer: MEDICARE

## 2022-03-21 VITALS
HEART RATE: 68 BPM | TEMPERATURE: 98.1 F | SYSTOLIC BLOOD PRESSURE: 152 MMHG | DIASTOLIC BLOOD PRESSURE: 76 MMHG | OXYGEN SATURATION: 96 % | RESPIRATION RATE: 17 BRPM

## 2022-03-21 DIAGNOSIS — Z48.02 REMOVAL OF STAPLE: ICD-10-CM

## 2022-03-21 DIAGNOSIS — R35.0 URINARY FREQUENCY: ICD-10-CM

## 2022-03-21 DIAGNOSIS — R41.82 ALTERED MENTAL STATUS, UNSPECIFIED ALTERED MENTAL STATUS TYPE: Primary | ICD-10-CM

## 2022-03-21 DIAGNOSIS — E87.1 HYPONATREMIA: ICD-10-CM

## 2022-03-21 LAB
APPEARANCE UR: CLEAR
BACTERIA URNS QL MICRO: NEGATIVE /HPF
BILIRUB UR QL: NEGATIVE
COLOR UR: YELLOW
EPITH CASTS URNS QL MICRO: NORMAL /LPF (ref 0–5)
GLUCOSE UR STRIP.AUTO-MCNC: >1000 MG/DL
HGB UR QL STRIP: NEGATIVE
KETONES UR QL STRIP.AUTO: NEGATIVE MG/DL
LEUKOCYTE ESTERASE UR QL STRIP.AUTO: ABNORMAL
NITRITE UR QL STRIP.AUTO: NEGATIVE
PH UR STRIP: 6.5 [PH] (ref 5–8)
PROT UR STRIP-MCNC: NEGATIVE MG/DL
RBC #/AREA URNS HPF: NORMAL /HPF (ref 0–5)
SP GR UR REFRACTOMETRY: 1.01 (ref 1–1.03)
UROBILINOGEN UR QL STRIP.AUTO: 0.2 EU/DL (ref 0.2–1)
WBC URNS QL MICRO: NORMAL /HPF (ref 0–4)

## 2022-03-21 PROCEDURE — 70450 CT HEAD/BRAIN W/O DYE: CPT

## 2022-03-21 PROCEDURE — 81001 URINALYSIS AUTO W/SCOPE: CPT

## 2022-03-21 PROCEDURE — 71045 X-RAY EXAM CHEST 1 VIEW: CPT

## 2022-03-21 PROCEDURE — 99284 EMERGENCY DEPT VISIT MOD MDM: CPT

## 2022-03-21 RX ORDER — LORAZEPAM 2 MG/ML
0.5 INJECTION INTRAMUSCULAR
Status: DISCONTINUED | OUTPATIENT
Start: 2022-03-21 | End: 2022-03-22 | Stop reason: HOSPADM

## 2022-03-21 NOTE — TELEPHONE ENCOUNTER
Pt's daughter calling again. Stated she thinks pt does need appt tomorrow after all. Stated pt is depressed, agitated and has anxiety. Stated also frequent urination, no burning sensation. She got up 6 times the night before last, 3 times last night and frequently during the day.      Please advise, no appts available

## 2022-03-22 ENCOUNTER — VIRTUAL VISIT (OUTPATIENT)
Dept: INTERNAL MEDICINE CLINIC | Age: 82
End: 2022-03-22

## 2022-03-22 DIAGNOSIS — N39.0 URINARY TRACT INFECTION WITHOUT HEMATURIA, SITE UNSPECIFIED: Primary | ICD-10-CM

## 2022-03-22 DIAGNOSIS — I65.29 STENOSIS OF CAROTID ARTERY, UNSPECIFIED LATERALITY: ICD-10-CM

## 2022-03-22 DIAGNOSIS — G47.00 INSOMNIA, UNSPECIFIED TYPE: ICD-10-CM

## 2022-03-22 DIAGNOSIS — R81 GLUCOSURIA: ICD-10-CM

## 2022-03-22 DIAGNOSIS — G96.08 SUBDURAL HYGROMA: ICD-10-CM

## 2022-03-22 DIAGNOSIS — R41.0 DELIRIUM: ICD-10-CM

## 2022-03-22 DIAGNOSIS — K86.89 PANCREATIC MASS: ICD-10-CM

## 2022-03-22 DIAGNOSIS — A31.0 MYCOBACTERIUM AVIUM INFECTION (HCC): ICD-10-CM

## 2022-03-22 LAB
ANION GAP SERPL CALC-SCNC: 5 MMOL/L (ref 3–18)
BASOPHILS # BLD: 0.1 K/UL (ref 0–0.1)
BASOPHILS NFR BLD: 1 % (ref 0–2)
BUN SERPL-MCNC: 17 MG/DL (ref 7–18)
BUN/CREAT SERPL: 24 (ref 12–20)
CALCIUM SERPL-MCNC: 8.9 MG/DL (ref 8.5–10.1)
CHLORIDE SERPL-SCNC: 97 MMOL/L (ref 100–111)
CO2 SERPL-SCNC: 27 MMOL/L (ref 21–32)
CREAT SERPL-MCNC: 0.72 MG/DL (ref 0.6–1.3)
DIFFERENTIAL METHOD BLD: ABNORMAL
EOSINOPHIL # BLD: 0.2 K/UL (ref 0–0.4)
EOSINOPHIL NFR BLD: 2 % (ref 0–5)
ERYTHROCYTE [DISTWIDTH] IN BLOOD BY AUTOMATED COUNT: 12.4 % (ref 11.6–14.5)
GLUCOSE SERPL-MCNC: 194 MG/DL (ref 74–99)
HCT VFR BLD AUTO: 34.6 % (ref 35–45)
HGB BLD-MCNC: 12 G/DL (ref 12–16)
IMM GRANULOCYTES # BLD AUTO: 0 K/UL (ref 0–0.04)
IMM GRANULOCYTES NFR BLD AUTO: 1 % (ref 0–0.5)
LYMPHOCYTES # BLD: 1.6 K/UL (ref 0.9–3.6)
LYMPHOCYTES NFR BLD: 19 % (ref 21–52)
MAGNESIUM SERPL-MCNC: 2.2 MG/DL (ref 1.6–2.6)
MCH RBC QN AUTO: 30.1 PG (ref 24–34)
MCHC RBC AUTO-ENTMCNC: 34.7 G/DL (ref 31–37)
MCV RBC AUTO: 86.7 FL (ref 78–100)
MONOCYTES # BLD: 0.9 K/UL (ref 0.05–1.2)
MONOCYTES NFR BLD: 11 % (ref 3–10)
NEUTS SEG # BLD: 5.6 K/UL (ref 1.8–8)
NEUTS SEG NFR BLD: 67 % (ref 40–73)
NRBC # BLD: 0 K/UL (ref 0–0.01)
NRBC BLD-RTO: 0 PER 100 WBC
PLATELET # BLD AUTO: 302 K/UL (ref 135–420)
PMV BLD AUTO: 10.6 FL (ref 9.2–11.8)
POTASSIUM SERPL-SCNC: 3.9 MMOL/L (ref 3.5–5.5)
RBC # BLD AUTO: 3.99 M/UL (ref 4.2–5.3)
SODIUM SERPL-SCNC: 129 MMOL/L (ref 136–145)
TROPONIN-HIGH SENSITIVITY: 12 NG/L (ref 0–54)
WBC # BLD AUTO: 8.4 K/UL (ref 4.6–13.2)

## 2022-03-22 PROCEDURE — 99443 PR PHYS/QHP TELEPHONE EVALUATION 21-30 MIN: CPT | Performed by: INTERNAL MEDICINE

## 2022-03-22 PROCEDURE — 83735 ASSAY OF MAGNESIUM: CPT

## 2022-03-22 PROCEDURE — 84484 ASSAY OF TROPONIN QUANT: CPT

## 2022-03-22 PROCEDURE — 80048 BASIC METABOLIC PNL TOTAL CA: CPT

## 2022-03-22 PROCEDURE — 74011250637 HC RX REV CODE- 250/637: Performed by: STUDENT IN AN ORGANIZED HEALTH CARE EDUCATION/TRAINING PROGRAM

## 2022-03-22 PROCEDURE — 85025 COMPLETE CBC W/AUTO DIFF WBC: CPT

## 2022-03-22 RX ORDER — HYDROXYZINE 25 MG/1
50 TABLET, FILM COATED ORAL
Status: COMPLETED | OUTPATIENT
Start: 2022-03-22 | End: 2022-03-22

## 2022-03-22 RX ORDER — HYDROXYZINE 50 MG/1
25 TABLET, FILM COATED ORAL
Qty: 20 TABLET | Refills: 0 | Status: SHIPPED | OUTPATIENT
Start: 2022-03-22 | End: 2022-04-01

## 2022-03-22 RX ORDER — CEFUROXIME AXETIL 250 MG/1
250 TABLET ORAL 2 TIMES DAILY
Qty: 10 TABLET | Refills: 0 | Status: SHIPPED | OUTPATIENT
Start: 2022-03-22 | End: 2022-03-27

## 2022-03-22 RX ADMIN — HYDROXYZINE HYDROCHLORIDE 50 MG: 25 TABLET, FILM COATED ORAL at 03:16

## 2022-03-22 NOTE — PROGRESS NOTES
TRANSITIONS OF CARE FACE-TO-FACE VISIT  INTERNISTS OF [facility]:  3/22/2022, MRN: [id number]  No chief complaint on file. Jana Tucker is a [de-identified] y.o. female and presents to clinic after recent hospitalization. Subjective:   Discharged from: [facility]    Summary of hospitalization problems/diagnoses:   The pt is [de-identified] female with h/o prediabetes, HLD, eczema per EHR, OA, HTN (followed by Sandie Cifuentes with Cardiology), sciatica, positive sputum for AFB (2021), carotid artery stenosis, pancreatic mass, and glaucoma. 1.  Atypical PNA: Her sputum was AFB positive in the past, status post a bronchoscopy. At her last visit, she reported generalized weakness and with losing weight. Since then, and ID doctor evaluated her during her most recent hospitalization. It was recommended that she start treatment. She was placed on rifabutin and azithromycin. Ethambutol was to be taken upon hospital discharge. Since hospital discharge, patient has yet to take this medication but she is scheduled with her pulmonologist, Davin Ames this Friday. 1/18/22 Chest CT: Nodular infiltrates with associated bronchial thickening and bronchiectatic changes.  Slightly increased in overall extent compared to 04/20/21.  Suspect underlying chronic indolent infectious process, such as WESLEY/ MAC infectious process.  Increasing cystic focus at the pancreatic head.  Recommend correlation with MR or multiphasic CT. 2.  Carotid Artery Stenosis: During her most recent hospitalization, she had a CTA of her neck. Findings are listed below. She was found to have carotid artery disease. Aspirin and Plavix are recommended. Due to her fall risk, her family has had her take only aspirin. No history of bleeding. 3/15/22 Neck CTA: lateral proximal cervical carotid atherosclerosis. On the left, this is potentially hemodynamically significant with 60-70% stenosis.  Reticulonodular findings within the lungs most characteristic of postinfectious/postinflammatory sequelae. Some accompanying chronic morphology bronchitis. 3. Right Frontal Subdural Hygroma: Suspected to be from a fall. A follow-up CT scan on March 22 did not show any significant changes. 3/17/22 Head CT: No acute intracranial hemorrhage, mass effect or midline structural shift. Unchanged small right hygroma. 4. Syncope: She was hospitalized recently after having a syncopal episode. There is some concern that she could have had a seizure. As a result, she was placed on Keppra upon hospital discharge. Imaging studies were obtained and are noted as mentioned above. Please see #5.    5. AMS: Upon hospital discharge, she had worsening agitation and delirium. She was evaluated in the emergency department on March 21. At that time, a chest x-ray was obtained and did not show any new findings. Her labs were also obtained and reassuring except for a urinalysis that showed trace leukocyte esterase and glucosuria. She has a history of prediabetes. Incidentally, she reports recent urinary frequency symptoms. Due to her delirium, her family has been holding her Keppra. Today she reports her sensorium is improving. Also note that in the emergency department, she was given a prescription for hydroxyzine for anxiety/agitation and insomnia. No adverse effects with use of this medication. In fact, it helped her sleep last night. 6.  Pancreatic mass: There is no finding on imaging earlier this year. While hospitalized, she had a  marker that was checked. It was elevated. 2/10/22 MRCP: The pancreatic head cystic lesion continues to grow. 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 While there is slight rim enhancement, there are no internal solid  suspicious components to indicate malignant transformation however the diffusion restriction in the enhancing wall does raise concern. Close follow-up advised. Small left renal cyst. Small hiatal hernia. Scoliosis.       Current Outpatient Medications on File Prior to Visit   Medication Sig Dispense Refill    hydrOXYzine HCL (ATARAX) 50 mg tablet Take 0.5 Tablets by mouth every six (6) hours as needed for Anxiety or Agitation for up to 10 days. 20 Tablet 0    ethambutoL (MYAMBUTOL) 400 mg tablet Take 1 Tablet by mouth daily. 30 Tablet 0    ethambutoL (MYAMBUTOL) 100 mg tablet Take 2 Tablets by mouth daily. 60 Tablet 0    clopidogreL (PLAVIX) 75 mg tab Take 1 Tablet by mouth daily. 30 Tablet 1    lisinopriL (PRINIVIL, ZESTRIL) 5 mg tablet Take 1 Tablet by mouth daily. 30 Tablet 0    timolol (TIMOPTIC) 0.5 % ophthalmic solution Administer 1 Drop to left eye two (2) times a day.  rifabutin (MYCOBUTIN) 150 mg capsule rifabutin 150 mg capsule   Take 1 capsule every day by oral route.  potassium chloride SR (K-TAB) 20 mEq tablet Take 2 Tablets by mouth daily. 10 Tablet 0    [DISCONTINUED] levETIRAcetam (Keppra) 250 mg tablet Take 1 Tablet by mouth two (2) times a day. 60 Tablet 0    azithromycin (ZITHROMAX) 250 mg tablet Take 250 mg by mouth daily.  aspirin delayed-release 81 mg tablet Take 1 Tablet by mouth daily. (Patient taking differently: Take 40.5 mg by mouth daily. ) 100 Tablet 3    neomycin-polymyxin-dexamethasone (DEXACINE) 3.5 mg/g-10,000 unit/g-0.1 % ophthalmic ointment       psyllium husk (DAILY FIBER PO) Take  by mouth.  omeprazole (PRILOSEC) 20 mg capsule Take 20 mg by mouth daily.  cyanocobalamin (Vitamin B-12) 1,000 mcg tablet Take 1,000 mcg by mouth daily.  folic acid/multivit-min/lutein (CENTRUM SILVER PO) Take  by mouth.  bimatoprost (LUMIGAN) 0.03 % ophthalmic drops Administer 1 Drop to both eyes every evening.  CALCIUM CARBONATE/VITAMIN D3 (CALCIUM WITH VITAMIN D PO) Take  by mouth.        Current Facility-Administered Medications on File Prior to Visit   Medication Dose Route Frequency Provider Last Rate Last Admin    [COMPLETED] hydrOXYzine HCL (ATARAX) tablet 50 mg  50 mg Oral NOW Jackqueline Rhein, DO   50 mg at 03/22/22 6247    [DISCONTINUED] LORazepam (ATIVAN) injection 0.5 mg  0.5 mg IntraVENous NOW Jackqueline Rhein, DO           Medication changes: yes  Medication list updated:  yes  Needs referral or labs:  no  Treatment Barriers: no      Patient Active Problem List    Diagnosis Date Noted    Hygroma 03/17/2022    WESLEY (mycobacterium avium-intracellulare) ([facility] [address] 03/17/2022    Syncope and collapse 03/14/2022    Hypokalemia 03/14/2022    Acid-fast bacteria present 06/30/2021    Chronic constipation 06/30/2021    Nonrheumatic aortic valve insufficiency 06/16/2021    Impaired fasting glucose 02/21/2018    Raynaud's disease without gangrene 05/15/2017    Hyperlipidemia LDL goal <130 03/23/2016    Essential hypertension 04/20/2015    Generalized osteoarthrosis, involving multiple sites 05/23/2012    Eczema     Glaucoma        Current Outpatient Medications   Medication Sig Dispense Refill    cefUROXime (CEFTIN) 250 mg tablet Take 1 Tablet by mouth two (2) times a day for 5 days. 10 Tablet 0    hydrOXYzine HCL (ATARAX) 50 mg tablet Take 0.5 Tablets by mouth every six (6) hours as needed for Anxiety or Agitation for up to 10 days. 20 Tablet 0    ethambutoL (MYAMBUTOL) 400 mg tablet Take 1 Tablet by mouth daily. 30 Tablet 0    ethambutoL (MYAMBUTOL) 100 mg tablet Take 2 Tablets by mouth daily. 60 Tablet 0    clopidogreL (PLAVIX) 75 mg tab Take 1 Tablet by mouth daily. 30 Tablet 1    lisinopriL (PRINIVIL, ZESTRIL) 5 mg tablet Take 1 Tablet by mouth daily. 30 Tablet 0    timolol (TIMOPTIC) 0.5 % ophthalmic solution Administer 1 Drop to left eye two (2) times a day.  rifabutin (MYCOBUTIN) 150 mg capsule rifabutin 150 mg capsule   Take 1 capsule every day by oral route.  potassium chloride SR (K-TAB) 20 mEq tablet Take 2 Tablets by mouth daily. 10 Tablet 0    azithromycin (ZITHROMAX) 250 mg tablet Take 250 mg by mouth daily.       aspirin delayed-release 81 mg tablet Take 1 Tablet by mouth daily. (Patient taking differently: Take 40.5 mg by mouth daily. ) 100 Tablet 3    neomycin-polymyxin-dexamethasone (DEXACINE) 3.5 mg/g-10,000 unit/g-0.1 % ophthalmic ointment       psyllium husk (DAILY FIBER PO) Take  by mouth.  omeprazole (PRILOSEC) 20 mg capsule Take 20 mg by mouth daily.  cyanocobalamin (Vitamin B-12) 1,000 mcg tablet Take 1,000 mcg by mouth daily.  folic acid/multivit-min/lutein (CENTRUM SILVER PO) Take  by mouth.  bimatoprost (LUMIGAN) 0.03 % ophthalmic drops Administer 1 Drop to both eyes every evening.  CALCIUM CARBONATE/VITAMIN D3 (CALCIUM WITH VITAMIN D PO) Take  by mouth.          Allergies   Allergen Reactions    Pravastatin Other (comments)     Arthralgias/myalgias       Past Medical History:   Diagnosis Date    Diverticulosis of sigmoid colon 11/13/15    mild; Dr. Elliot Velazquez Eczema     Elevated cholesterol     Family history of diabetes mellitus (DM)     Glaucoma     Hyperlipidemia     Hypertension     Indigestion     Interpolated PVCs        Past Surgical History:   Procedure Laterality Date    HX BREAST BIOPSY      HX HYSTERECTOMY  1981    STEPHAN/BSO    HX OOPHORECTOMY      right       Family History   Problem Relation Age of Onset   [facility] Glaucoma Mother     Other Father         cerebral aneurysm    Hypertension Sister     Diabetes Sister     Hypertension Sister     Diabetes Paternal Uncle        Social History     Tobacco Use    Smoking status: Never Smoker    Smokeless tobacco: Never Used   Substance Use Topics    Alcohol use: No     Comment: with dinner a few days per week       ROS:  - See HPI    LABS   Data Review:   Lab Results   Component Value Date/Time    WBC 8.4 03/22/2022 01:22 AM    WBC 6.8 05/16/2012 09:03 AM    HGB 12.0 03/22/2022 01:22 AM    HCT 34.6 (L) 03/22/2022 01:22 AM    PLATELET 101 57/36/1683 01:22 AM    MCV 86.7 03/22/2022 01:22 AM       Lab Results   Component Value Date/Time    Sodium 129 (L) 03/22/2022 01:22 AM    Potassium 3.9 03/22/2022 01:22 AM    Chloride 97 (L) 03/22/2022 01:22 AM    CO2 27 03/22/2022 01:22 AM    Anion gap 5 03/22/2022 01:22 AM    Glucose 194 (H) 03/22/2022 01:22 AM    BUN 17 03/22/2022 01:22 AM    Creatinine 0.72 03/22/2022 01:22 AM    BUN/Creatinine ratio 24 (H) 03/22/2022 01:22 AM    GFR est AA >60 03/22/2022 01:22 AM    GFR est non-AA >60 03/22/2022 01:22 AM    Calcium 8.9 03/22/2022 01:22 AM       Lab Results   Component Value Date/Time    Cholesterol, total 229 (H) 11/05/2021 09:09 AM    HDL Cholesterol 60 11/05/2021 09:09 AM    LDL, calculated 149.4 (H) 11/05/2021 09:09 AM    VLDL, calculated 19.6 11/05/2021 09:09 AM    Triglyceride 98 11/05/2021 09:09 AM    CHOL/HDL Ratio 3.8 11/05/2021 09:09 AM       Lab Results   Component Value Date/Time    Hemoglobin A1c 6.3 (H) 11/05/2021 09:09 AM       Assessment/Plan:   Lab review: labs are reviewed in the EHR  Referrals: not needed  Complexity: Moderate    Community resources identified for patient: none needed  Durable Medical Equipment: none needed    1. Urinary tract infection without hematuria: Per UA and hx  -Ordering Ceftin course. ORDERS:  - cefUROXime (CEFTIN) 250 mg tablet; Take 1 Tablet by mouth two (2) times a day for 5 days. Dispense: 10 Tablet; Refill: 0    2. Mycobacterium avium infection:   -I encouraged her to follow-up with  for treatment recommendations.  -Okay to continue with rifabutin and azithromycin. We discussed the importance of the addition of ethambutol, to combat bacterial resistance. Ultimately defer to her pulmonologist for recommendations. 3.  Carotid Artery Stenosis: New diagnosis. -Okay to hold Plavix given her fall risk.  -Continue with aspirin for now. 4. Glucosuria: +H/o prediabetes. I suspect she likely developed diabetic range hyperglycemia secondary to pancreatic insufficiency.   -Given her urinary symptoms, we are treating her as mentioned above for a presumed UTI.  -She should have a follow-up A1c to assess for type 2 diabetes. -She should also have pancreatic enzymes to assess for pancreatic insufficiency  -I will address getting additional lab work at her follow-up visit with me in April. 5. Pancreatic mass: Elevated CA-19-9. Causing pancreatic insufficiency?  -I will discuss this more in depth with her at her upcoming appointment in April. We will need to discuss a plan of care. EGD vs observation/palliative care measures. 6. Insomnia: Responding well to hydroxyzine.  -Okay to continue with hydroxyzine as needed for insomnia symptoms. 7. AMS and Syncope Hx: Improving.  -Okay to continue holding Keppra    8. Right Frontal Subdural Hygroma: Her sensorium is improving. Observation. A. Key points we discussed today:  1. Pt instructed to call our office if symptoms worsen or if the pt/caregiver has any questions. 2. Handout to be given via mail. Orders Placed This Encounter    cefUROXime (CEFTIN) 250 mg tablet     Sig: Take 1 Tablet by mouth two (2) times a day for 5 days. Dispense:  10 Tablet     Refill:  0       B. New labs/medications ordered today:   1. A new medication list was given to the patient/family/caregiver. The medication list has been updated. A new medication list was given today to the patient. I have discussed the diagnosis with the patient and the intended plan as seen in the above orders. The patient has received an after-visit summary and questions were answered concerning future plans. I have discussed medication side effects and warnings with the patient as well. I have reviewed the plan of care with the patient, accepted their input and they are in agreement with the treatment goals. All questions were answered. The patient understands the plan of care. Handouts provided today with above information. Pt instructed if symptoms worsen to call the office or report to the ED for continued care.   Greater than 50% of the visit time was spent in counseling and/or coordination of care. I spent 22 min with the patient and her family for this phone only encounter. Due to this being a TeleHealth phone only evaluation, many elements of the physical examination are unable to be assessed.      The pt was seen by synchronous (real-time) audio-phone only technology, and/or her healthcare decision maker, is aware that this patient-initiated, Telehealth encounter is a billable service, with coverage as determined by her insurance carrier. She is aware that she may receive a bill and has provided verbal consent to proceed: Yes.      The pt is being evaluated by a phone only visit encounter for concerns as above. A caregiver was present when appropriate. Due to this being a TeleHealth encounter (During OWPKN-01 public health emergency), evaluation of the following organ systems was limited: Vitals/Constitutional/EENT/Resp/CV/GI//MS/Neuro/Skin/Heme-Lymph-Imm. Pursuant to the emergency declaration under the [facility] waiver authority and the [employer] and [employer] Act, this Virtual phone only visit was conducted, with patient's (and/or legal guardian's) consent, to reduce the patient's risk of exposure to COVID-19 and provide necessary medical care.      Services were provided through a phone only synchronous discussion virtually to substitute for in-person clinic visit. Patient and provider were located at their individual home/office respectively.        We discussed the expected course, resolution and complications of the diagnosis(es) in detail. Medication risks, benefits, costs, interactions, and alternatives were discussed as indicated. I advised her to contact the office if her condition worsens, changes or fails to improve as anticipated. She expressed understanding with the diagnosis(es) and plan.

## 2022-03-22 NOTE — ED PROVIDER NOTES
EMERGENCY DEPARTMENT HISTORY AND PHYSICAL EXAM      Date: 3/21/2022  Patient Name: Delroy Quevedo    History of Presenting Illness     Chief Complaint   Patient presents with    Urinary Frequency    Altered mental status       History (Context): Delroy Queevdo is a [de-identified] y.o. female with a past medical history significant for diverticulitis, hypertension, hyperlipidemia, comes into the ED today due to altered mental status and increased frequency of urination. Patient brought to the emergency department by her daughter. Daughter states that she recently had a fall that required staples to the head. Since that time she is also been started on new medications and is not acting her normal self. Daughter states that she has had increased frequency of urination over the past few days and appears to be restless and not her normal self. She also admits to having difficulty with sleeping. Patient has not had any further falls since inciting event. She otherwise states she been healthy without a fever, chills, or illness-like symptoms. Patient denies any severity for her symptoms. She denies any alleviating exacerbating factors. PCP: Eitan Virgen MD    Current Facility-Administered Medications   Medication Dose Route Frequency Provider Last Rate Last Admin    LORazepam (ATIVAN) injection 0.5 mg  0.5 mg IntraVENous NOW Calderon Myers, DO         Current Outpatient Medications   Medication Sig Dispense Refill    hydrOXYzine HCL (ATARAX) 50 mg tablet Take 0.5 Tablets by mouth every six (6) hours as needed for Anxiety or Agitation for up to 10 days. 20 Tablet 0    ethambutoL (MYAMBUTOL) 400 mg tablet Take 1 Tablet by mouth daily. 30 Tablet 0    ethambutoL (MYAMBUTOL) 100 mg tablet Take 2 Tablets by mouth daily. 60 Tablet 0    clopidogreL (PLAVIX) 75 mg tab Take 1 Tablet by mouth daily. 30 Tablet 1    lisinopriL (PRINIVIL, ZESTRIL) 5 mg tablet Take 1 Tablet by mouth daily.  30 Tablet 0    timolol (TIMOPTIC) 0.5 % ophthalmic solution Administer 1 Drop to left eye two (2) times a day.  rifabutin (MYCOBUTIN) 150 mg capsule rifabutin 150 mg capsule   Take 1 capsule every day by oral route.  levETIRAcetam (Keppra) 250 mg tablet Take 1 Tablet by mouth two (2) times a day. 60 Tablet 0    potassium chloride SR (K-TAB) 20 mEq tablet Take 2 Tablets by mouth daily. 10 Tablet 0    azithromycin (ZITHROMAX) 250 mg tablet Take 250 mg by mouth daily.  aspirin delayed-release 81 mg tablet Take 1 Tablet by mouth daily. (Patient taking differently: Take 40.5 mg by mouth daily. ) 100 Tablet 3    neomycin-polymyxin-dexamethasone (DEXACINE) 3.5 mg/g-10,000 unit/g-0.1 % ophthalmic ointment       psyllium husk (DAILY FIBER PO) Take  by mouth.  omeprazole (PRILOSEC) 20 mg capsule Take 20 mg by mouth daily.  cyanocobalamin (Vitamin B-12) 1,000 mcg tablet Take 1,000 mcg by mouth daily.  folic acid/multivit-min/lutein (CENTRUM SILVER PO) Take  by mouth.  bimatoprost (LUMIGAN) 0.03 % ophthalmic drops Administer 1 Drop to both eyes every evening.  CALCIUM CARBONATE/VITAMIN D3 (CALCIUM WITH VITAMIN D PO) Take  by mouth.          Past History     Past Medical History:   Past Medical History:   Diagnosis Date    Diverticulosis of sigmoid colon 11/13/15    mild; Dr. Refugio Costa Eczema     Elevated cholesterol     Family history of diabetes mellitus (DM)     Glaucoma     Hyperlipidemia     Hypertension     Indigestion     Interpolated PVCs        Past Surgical History:  Past Surgical History:   Procedure Laterality Date    HX BREAST BIOPSY      HX HYSTERECTOMY  1981    STEPHAN/BSO    HX OOPHORECTOMY      right       Family History:  Family History   Problem Relation Age of Onset   Ronnie Dalal Glaucoma Mother     Other Father         cerebral aneurysm    Hypertension Sister     Diabetes Sister     Hypertension Sister     Diabetes Paternal Uncle        Social History:   Social History     Tobacco Use  Smoking status: Never Smoker    Smokeless tobacco: Never Used   Substance Use Topics    Alcohol use: No     Comment: with dinner a few days per week    Drug use: No       Allergies: Allergies   Allergen Reactions    Pravastatin Other (comments)     Arthralgias/myalgias       PMH, PSH, family history, social history, allergies reviewed with the patient with significant items noted above. Review of Systems   Review of Systems   Constitutional: Negative for chills and fever. HENT: Negative for sore throat. Eyes: Negative for visual disturbance. Respiratory: Negative for shortness of breath. Cardiovascular: Negative for chest pain. Gastrointestinal: Negative for abdominal pain, nausea and vomiting. Genitourinary: Positive for frequency. Negative for difficulty urinating and dysuria. Musculoskeletal: Negative for myalgias. Skin: Negative for rash. Neurological: Negative for headaches. AMS   Psychiatric/Behavioral: Positive for behavioral problems and decreased concentration. Restless       Physical Exam     Vitals:    03/21/22 2215   BP: (!) 152/76   Pulse: 68   Resp: 17   Temp: 98.1 °F (36.7 °C)   SpO2: 96%       Physical Exam  Vitals and nursing note reviewed. Constitutional:       General: She is not in acute distress. Appearance: Normal appearance. She is not ill-appearing or toxic-appearing. HENT:      Head: Normocephalic and atraumatic. Mouth/Throat:      Mouth: Mucous membranes are moist.   Eyes:      General: No scleral icterus. Conjunctiva/sclera: Conjunctivae normal.   Cardiovascular:      Rate and Rhythm: Normal rate and regular rhythm. Comments: Normal peripheral perfusion  Pulmonary:      Effort: Pulmonary effort is normal. No respiratory distress. Abdominal:      General: There is no distension. Palpations: Abdomen is soft. Tenderness: There is no abdominal tenderness. Musculoskeletal:         General: No deformity.  Normal range of motion. Cervical back: Normal range of motion and neck supple. Skin:     General: Skin is warm and dry. Findings: No rash. Neurological:      General: No focal deficit present. Mental Status: She is alert and oriented to person, place, and time. Mental status is at baseline. Motor: No weakness. Gait: Gait normal.   Psychiatric:         Mood and Affect: Mood normal.         Thought Content: Thought content normal.         Diagnostic Study Results     Labs -     Recent Results (from the past 12 hour(s))   URINALYSIS W/ RFLX MICROSCOPIC    Collection Time: 03/21/22 10:18 PM   Result Value Ref Range    Color YELLOW      Appearance CLEAR      Specific gravity 1.015 1.005 - 1.030      pH (UA) 6.5 5.0 - 8.0      Protein Negative NEG mg/dL    Glucose >1,000 (A) NEG mg/dL    Ketone Negative NEG mg/dL    Bilirubin Negative NEG      Blood Negative NEG      Urobilinogen 0.2 0.2 - 1.0 EU/dL    Nitrites Negative NEG      Leukocyte Esterase SMALL (A) NEG     URINE MICROSCOPIC ONLY    Collection Time: 03/21/22 10:18 PM   Result Value Ref Range    WBC 1 to 3 0 - 4 /hpf    RBC 0 to 1 0 - 5 /hpf    Epithelial cells 2+ 0 - 5 /lpf    Bacteria Negative NEG /hpf   CBC WITH AUTOMATED DIFF    Collection Time: 03/22/22  1:22 AM   Result Value Ref Range    WBC 8.4 4.6 - 13.2 K/uL    RBC 3.99 (L) 4.20 - 5.30 M/uL    HGB 12.0 12.0 - 16.0 g/dL    HCT 34.6 (L) 35.0 - 45.0 %    MCV 86.7 78.0 - 100.0 FL    MCH 30.1 24.0 - 34.0 PG    MCHC 34.7 31.0 - 37.0 g/dL    RDW 12.4 11.6 - 14.5 %    PLATELET 584 985 - 913 K/uL    MPV 10.6 9.2 - 11.8 FL    NRBC 0.0 0  WBC    ABSOLUTE NRBC 0.00 0.00 - 0.01 K/uL    NEUTROPHILS 67 40 - 73 %    LYMPHOCYTES 19 (L) 21 - 52 %    MONOCYTES 11 (H) 3 - 10 %    EOSINOPHILS 2 0 - 5 %    BASOPHILS 1 0 - 2 %    IMMATURE GRANULOCYTES 1 (H) 0.0 - 0.5 %    ABS. NEUTROPHILS 5.6 1.8 - 8.0 K/UL    ABS. LYMPHOCYTES 1.6 0.9 - 3.6 K/UL    ABS. MONOCYTES 0.9 0.05 - 1.2 K/UL    ABS.  EOSINOPHILS 0.2 0.0 - 0.4 K/UL    ABS. BASOPHILS 0.1 0.0 - 0.1 K/UL    ABS. IMM. GRANS. 0.0 0.00 - 0.04 K/UL    DF AUTOMATED     METABOLIC PANEL, BASIC    Collection Time: 03/22/22  1:22 AM   Result Value Ref Range    Sodium 129 (L) 136 - 145 mmol/L    Potassium 3.9 3.5 - 5.5 mmol/L    Chloride 97 (L) 100 - 111 mmol/L    CO2 27 21 - 32 mmol/L    Anion gap 5 3.0 - 18 mmol/L    Glucose 194 (H) 74 - 99 mg/dL    BUN 17 7.0 - 18 MG/DL    Creatinine 0.72 0.6 - 1.3 MG/DL    BUN/Creatinine ratio 24 (H) 12 - 20      GFR est AA >60 >60 ml/min/1.73m2    GFR est non-AA >60 >60 ml/min/1.73m2    Calcium 8.9 8.5 - 10.1 MG/DL   TROPONIN-HIGH SENSITIVITY    Collection Time: 03/22/22  1:22 AM   Result Value Ref Range    Troponin-High Sensitivity 12 0 - 54 ng/L   MAGNESIUM    Collection Time: 03/22/22  1:22 AM   Result Value Ref Range    Magnesium 2.2 1.6 - 2.6 mg/dL      Labs Reviewed   URINALYSIS W/ RFLX MICROSCOPIC - Abnormal; Notable for the following components:       Result Value    Glucose >1,000 (*)     Leukocyte Esterase SMALL (*)     All other components within normal limits   CBC WITH AUTOMATED DIFF - Abnormal; Notable for the following components:    RBC 3.99 (*)     HCT 34.6 (*)     LYMPHOCYTES 19 (*)     MONOCYTES 11 (*)     IMMATURE GRANULOCYTES 1 (*)     All other components within normal limits   METABOLIC PANEL, BASIC - Abnormal; Notable for the following components:    Sodium 129 (*)     Chloride 97 (*)     Glucose 194 (*)     BUN/Creatinine ratio 24 (*)     All other components within normal limits   URINE MICROSCOPIC ONLY   TROPONIN-HIGH SENSITIVITY   MAGNESIUM       Radiologic Studies -   CT HEAD WO CONT   Final Result      No new findings. Stable trace right frontal subdural hygroma. XR CHEST PORT   Final Result   1. No acute cardiopulmonary process. CT Results  (Last 48 hours)               03/22/22 0049  CT HEAD WO CONT Final result    Impression:      No new findings.    Stable trace right frontal subdural hygroma. Narrative:  CT of the head without contrast       HISTORY: Altered mental status, agitated and restless       COMPARISON: CT 3/17/2022 and MRI 3/16/2022       All CT scans at this facility are performed using dose optimization technique as   appropriate to a performed exam, to include automated exposure control,   adjustment of the MA and/or kV according to patient size (including appropriate   matching for site-specific examinations) or use of  iterative reconstruction   technique. FINDINGS: Trace right frontal subdural hygroma, stable. No acute intracranial   hemorrhage. No major territorial infarction. No space-occupying mass. Visualized   paranasal sinuses and mastoid air cells are clear. No acute osseous abnormality. CXR Results  (Last 48 hours)               03/21/22 2356  XR CHEST PORT Final result    Impression:  1. No acute cardiopulmonary process. Narrative:  EXAM: Chest Radiograph       INDICATION:  AMS, eval for consolidation       TECHNIQUE: AP view of the chest       COMPARISON: 3/14/2022, 2/19/2021, 10/10/2006       FINDINGS: No pneumothorax identified. The lungs are clear. No infiltrates   appreciated. No effusions identified. The cardiomediastinal silhouette is   unremarkable. The pulmonary vasculature is unremarkable. The osseous   structures are unremarkable. The laboratory results, imaging results, and other diagnostic exams were reviewed in the EMR. Medical Decision Making   I am the first provider for this patient. I reviewed the vital signs, available nursing notes, past medical history, past surgical history, family history and social history. Vital Signs-Reviewed the patient's vital signs.       Records Reviewed: Personally, on initial evaluation    MDM:   Raysa Stack presents with complaint of altered mental status and increased frequency of urination  DDX includes but is not limited to: UTI, intracranial hemorrhage, electrolyte abnormality    Patient overall well-appearing, no acute distress, and vital signs grossly within normal limits. Will obtain lab work and imaging for further evaluation of patients complaint. Will continue to monitor and evaluate patient while in the ED. Orders as below:  Orders Placed This Encounter    XR CHEST PORT    CT HEAD WO CONT    URINALYSIS W/ RFLX MICROSCOPIC    URINE MICROSCOPIC ONLY    CBC WITH AUTOMATED DIFF    BASIC METABOLIC PANEL    TROPONIN-HIGH SENSITIVITY    MAGNESIUM    LORazepam (ATIVAN) injection 0.5 mg    hydrOXYzine HCL (ATARAX) tablet 50 mg    hydrOXYzine HCL (ATARAX) 50 mg tablet        ED Course:   ED Course as of 03/22/22 0653   Tue Mar 22, 2022   0000 Chest x-ray does not show any acute cardiopulmonary abnormalities on ED provider read. We will continue to monitor patient. [DV]   2526 Patient's urine negative for infection. We will continue to monitor patient. [DV]   8033 Patients lab work significant for sodium 129, glucose 194, otherwise labs grossly within normal limits. CT scan does not show any acute intracranial abnormalities. Will discharge patient home with return precautions and follow-up recommendations. Patient and daughter verbalized understanding is without any further questions. [DV]      ED Course User Index  [DV] Manoj Sweeney DO           Procedures:  Suture/Staple Removal    Date/Time: 3/22/2022 6:57 AM  Performed by: Manoj Sweeney DO  Authorized by: Manoj Sweeney DO     Consent:     Consent obtained:  Verbal    Consent given by:  Patient    Risks discussed:  Bleeding and pain    Alternatives discussed:  No treatment  Location:     Location:  Head/neck    Head/neck location:  Scalp  Procedure details:     Wound appearance:  No signs of infection, good wound healing and clean    Number of staples removed:  8  Post-procedure details:     Patient tolerance of procedure:   Tolerated well, no immediate complications            Diagnosis and Disposition     CLINICAL IMPRESSION:  1. Urinary frequency    2. Altered mental status, unspecified altered mental status type    3. Hyponatremia    4. Removal of staple      Discharge Medication List as of 3/22/2022  2:56 AM      CONTINUE these medications which have NOT CHANGED    Details   !! ethambutoL (MYAMBUTOL) 400 mg tablet Take 1 Tablet by mouth daily. , Normal, Disp-30 Tablet, R-0      !! ethambutoL (MYAMBUTOL) 100 mg tablet Take 2 Tablets by mouth daily. , Normal, Disp-60 Tablet, R-0      clopidogreL (PLAVIX) 75 mg tab Take 1 Tablet by mouth daily. , Normal, Disp-30 Tablet, R-1      lisinopriL (PRINIVIL, ZESTRIL) 5 mg tablet Take 1 Tablet by mouth daily. , Normal, Disp-30 Tablet, R-0      timolol (TIMOPTIC) 0.5 % ophthalmic solution Administer 1 Drop to left eye two (2) times a day., Historical Med      rifabutin (MYCOBUTIN) 150 mg capsule rifabutin 150 mg capsule   Take 1 capsule every day by oral route., Historical Med      levETIRAcetam (Keppra) 250 mg tablet Take 1 Tablet by mouth two (2) times a day., Normal, Disp-60 Tablet, R-0      potassium chloride SR (K-TAB) 20 mEq tablet Take 2 Tablets by mouth daily. , Normal, Disp-10 Tablet, R-0      azithromycin (ZITHROMAX) 250 mg tablet Take 250 mg by mouth daily. , Historical Med      aspirin delayed-release 81 mg tablet Take 1 Tablet by mouth daily. , Normal, Disp-100 Tablet, R-3      neomycin-polymyxin-dexamethasone (DEXACINE) 3.5 mg/g-10,000 unit/g-0.1 % ophthalmic ointment Historical Med      psyllium husk (DAILY FIBER PO) Take  by mouth., Historical Med      omeprazole (PRILOSEC) 20 mg capsule Take 20 mg by mouth daily. , Historical Med      cyanocobalamin (Vitamin B-12) 1,000 mcg tablet Take 1,000 mcg by mouth daily. , Historical Med      folic acid/multivit-min/lutein (CENTRUM SILVER PO) Take  by mouth., Historical Med      bimatoprost (LUMIGAN) 0.03 % ophthalmic drops Administer 1 Drop to both eyes every evening., Historical Med      CALCIUM CARBONATE/VITAMIN D3 (CALCIUM WITH VITAMIN D PO) Take  by mouth., Historical Med       !! - Potential duplicate medications found. Please discuss with provider. Disposition: Home    Patient condition at time of disposition: Stable    DISCHARGE NOTE:   Pt has been reexamined. Patient has no new complaints, changes, or physical findings. Care plan outlined and precautions discussed. Results were reviewed with the patient. All medications were reviewed with the patient. All of pt's questions and concerns were addressed. Alarm symptoms and return precautions associated with chief complaint and evaluation were reviewed with the patient in detail. The patient demonstrated adequate understanding. Patient was instructed to follow up with PCP, as well as strict return precautions to the ED upon further deterioration. Patient is ready to go home. The patient is happy with this plan          Dragon Disclaimer     Please note that this dictation was completed with [employer], the computer voice recognition software. Quite often unanticipated grammatical, syntax, homophones, and other interpretive errors are inadvertently transcribed by the computer software. Please disregard these errors. Please excuse any errors that have escaped final proofreading. [place] BANDA

## 2022-03-22 NOTE — ED TRIAGE NOTES
Patient has been having frequent urination since Saturday and is a little agitated and restless per her daughter.

## 2022-03-24 PROBLEM — K86.89 PANCREATIC MASS: Status: ACTIVE | Noted: 2022-03-22

## 2022-03-24 PROBLEM — I65.29 STENOSIS OF CAROTID ARTERY: Status: ACTIVE | Noted: 2022-03-22

## 2022-03-24 PROBLEM — D18.1 HYGROMA: Status: ACTIVE | Noted: 2022-03-17

## 2022-03-24 PROBLEM — A31.0 MAI (MYCOBACTERIUM AVIUM-INTRACELLULARE) (HCC): Status: ACTIVE | Noted: 2022-03-17

## 2022-03-30 ENCOUNTER — OFFICE VISIT (OUTPATIENT)
Dept: NEUROLOGY | Age: 82
End: 2022-03-30
Payer: MEDICARE

## 2022-03-30 VITALS
SYSTOLIC BLOOD PRESSURE: 118 MMHG | WEIGHT: 101.2 LBS | HEART RATE: 69 BPM | DIASTOLIC BLOOD PRESSURE: 64 MMHG | RESPIRATION RATE: 14 BRPM | OXYGEN SATURATION: 94 % | BODY MASS INDEX: 19.76 KG/M2

## 2022-03-30 DIAGNOSIS — R55 SYNCOPE AND COLLAPSE: Primary | ICD-10-CM

## 2022-03-30 PROCEDURE — G8427 DOCREV CUR MEDS BY ELIG CLIN: HCPCS | Performed by: NURSE PRACTITIONER

## 2022-03-30 PROCEDURE — G8399 PT W/DXA RESULTS DOCUMENT: HCPCS | Performed by: NURSE PRACTITIONER

## 2022-03-30 PROCEDURE — G8432 DEP SCR NOT DOC, RNG: HCPCS | Performed by: NURSE PRACTITIONER

## 2022-03-30 PROCEDURE — 1101F PT FALLS ASSESS-DOCD LE1/YR: CPT | Performed by: NURSE PRACTITIONER

## 2022-03-30 PROCEDURE — 1111F DSCHRG MED/CURRENT MED MERGE: CPT | Performed by: NURSE PRACTITIONER

## 2022-03-30 PROCEDURE — 99214 OFFICE O/P EST MOD 30 MIN: CPT | Performed by: NURSE PRACTITIONER

## 2022-03-30 PROCEDURE — G0463 HOSPITAL OUTPT CLINIC VISIT: HCPCS | Performed by: NURSE PRACTITIONER

## 2022-03-30 PROCEDURE — G8754 DIAS BP LESS 90: HCPCS | Performed by: NURSE PRACTITIONER

## 2022-03-30 PROCEDURE — G8420 CALC BMI NORM PARAMETERS: HCPCS | Performed by: NURSE PRACTITIONER

## 2022-03-30 PROCEDURE — G8536 NO DOC ELDER MAL SCRN: HCPCS | Performed by: NURSE PRACTITIONER

## 2022-03-30 PROCEDURE — G8752 SYS BP LESS 140: HCPCS | Performed by: NURSE PRACTITIONER

## 2022-03-30 PROCEDURE — 1090F PRES/ABSN URINE INCON ASSESS: CPT | Performed by: NURSE PRACTITIONER

## 2022-03-30 NOTE — PROGRESS NOTES
He Balbuena is a [de-identified] y.o. female who is in the office as  Hos follow up. 1. Have you been to the ER, urgent care clinic since your last visit? Hospitalized since your last visit? Yes When: [facility] for 3/2022    2. Have you seen or consulted any other health care providers outside of the [address] [place] [address] since your last visit? Include any pap smears or colon screening.  No

## 2022-03-30 NOTE — Clinical Note
Sorry, could you check with daughter to see if they wanted PT? HH or outpatient? Also, could you let know I called son on Fri but couldn't get through- I can call at some point if they like. Thanks .

## 2022-03-30 NOTE — PROGRESS NOTES
[facility]  [address] [facility], [address], [place], [address]  [address] [place], [address]  Office:  [phone]  Fax: [phone]  Chief Complaint   Patient presents with   [facility] Follow Up     Syncope       HPI: [facility] hospital follow-up. She was seen in the hospital at [facility] on 3/15/2022 in hospital consultation by Dr. Devi Stringer. History per records is as follows. She has history of hypertension, diabetes, and hyperlipidemia. She presented with a syncopal episode. Her daughter heard her fall and found her on the floor shaking and unresponsive for 1 minute. She suffered a laceration to the right side of the head. She has been treated with azithromycin and mycobutin for a fungal infection. Further, she was hypotensive in the past and her blood pressure medications were being adjusted. CT head reported no acute intracranial process, and right posterior scalp soft tissue contusion/hematoma. It was noted on exam that she had some postural dizziness with rapid rates from lying to seated, dissipated with stabilization at each posture. Assessment was recurrent syncopal episodes, with reassuring neurological exam, underlying distal symmetric peripheral neuropathy, and orthstatism. Plan was ortho status and care, compression stockings, hydration, exercises, PT/OT eval.  She also had a carotid ultrasound which reported mild plaque at the bifurcation and ICA on the right, with less than 50% stenosis. Moderate mixed density plaque at the bifurcation and ICA on the left, with 50 to 69% stenosis. She was seen by vascular surgery. 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 She had short interval follow-up head CT on March 17 which reported small right hygroma was unchanged, and on March 21 which reported stable trace right frontal subdural hygroma.     She presents today in follow-up. She is with her daughter. Her daughter helps provide history. The patient reports feeling okay, just nervous. She endorses at onset of passing out spell, she got dizzy beforehand. She was in the kitchen. She was being treated for fungal infection. She reports lung infection discovered when she had bronchoscopy. She had bronchoscopy because she was having shortness of breath. This was new for her, before this she would walk for an hour, was very active. Denies diarrhea at the time. Was eating and drinking okay. Some constipation. The shaking did not last long, per her daughter. There was also a spell in February. With the first spell, she fainted, and had a little shaking without one too. Was wide awake after it and said she did not want to go to the ER but went to urgent care at that time. She went to the ER with the second episode because she hit her head. She denies any headache at this time. Sometimes gets a mild headache, which occurs when laying down, but it goes away, and she reports it is not really a headache, just a mild sensation. She is no longer on Keppra. Daughter notes she was getting irritable and antsy on it. She received antibiotics from the hospitalization for her urine. Her daughter reports she feels weak, some right hand shaking still or weakness, more with movement, consistent with tremor. She lives alone but now her daughter has been with her. They will discuss living situation. Her son who is an ER physician in [place] is coming for a few days. She has support at home now. In terms of feeling back to herself yet, she does not know. She denies feeling dizzy or lightheaded. No recurrence since the hospitalization. She is stable with ambulating. She is on aspirin 81 mg. She has been following with pulmonology. Daughter reports they did not want to start ethambutol yet. He presribed vitamin B6 due to some confusion.   She is already on vitamin B12. Endorses maybe some depression. Endorses some confusion sometimes, keeping up with things, as of pretty recently. TSH was normal.  Vitamin B12 was ok. She has a cardiologist.  She has an event monitor on currently for 14 days. Blood pressure today was 118/64 with a pulse of 69. Daughter reports BP is low for her. Past Medical History:   Diagnosis Date    Diverticulosis of sigmoid colon 11/13/15    mild; Dr. Hernandez May Eczema     Elevated cholesterol     Family history of diabetes mellitus (DM)     Glaucoma     Hyperlipidemia     Hypertension     Indigestion     Interpolated PVCs        Past Surgical History:   Procedure Laterality Date    HX BREAST BIOPSY      HX HYSTERECTOMY  1981    STEPHAN/BSO    HX OOPHORECTOMY      right       Current Outpatient Medications   Medication Sig Dispense Refill    ethambutoL (MYAMBUTOL) 400 mg tablet Take 1 Tablet by mouth daily. 30 Tablet 0    ethambutoL (MYAMBUTOL) 100 mg tablet Take 2 Tablets by mouth daily. 60 Tablet 0    lisinopriL (PRINIVIL, ZESTRIL) 5 mg tablet Take 1 Tablet by mouth daily. 30 Tablet 0    timolol (TIMOPTIC) 0.5 % ophthalmic solution Administer 1 Drop to left eye two (2) times a day.  rifabutin (MYCOBUTIN) 150 mg capsule rifabutin 150 mg capsule   Take 1 capsule every day by oral route.  azithromycin (ZITHROMAX) 250 mg tablet Take 250 mg by mouth daily.  aspirin delayed-release 81 mg tablet Take 1 Tablet by mouth daily. 100 Tablet 3    psyllium husk (DAILY FIBER PO) Take  by mouth.  omeprazole (PRILOSEC) 20 mg capsule Take 20 mg by mouth daily.  cyanocobalamin (Vitamin B-12) 1,000 mcg tablet Take 1,000 mcg by mouth daily.  folic acid/multivit-min/lutein (CENTRUM SILVER PO) Take  by mouth.  bimatoprost (LUMIGAN) 0.03 % ophthalmic drops Administer 1 Drop to both eyes every evening.  CALCIUM CARBONATE/VITAMIN D3 (CALCIUM WITH VITAMIN D PO) Take  by mouth.           Allergies   Allergen Reactions    Pravastatin Other (comments)     Arthralgias/myalgias       Social History     Tobacco Use    Smoking status: Never Smoker    Smokeless tobacco: Never Used   Substance Use Topics    Alcohol use: No     Comment: with dinner a few days per week    Drug use: No       Family History   Problem Relation Age of Onset    Glaucoma Mother     Other Father         cerebral aneurysm    Hypertension Sister     Diabetes Sister     Hypertension Sister     Diabetes Paternal Uncle        Review of Systems:  GENERAL: Denies fever or fatigue  CARDIAC: No CP or SOB  PULMONARY: No cough or SOB  MUSCULOSKELETAL: No new joint pain  NEURO: SEE HPI    Physical Examination:  Visit Vitals  /64   Pulse 69   Resp 14   Wt 45.9 kg (101 lb 3.2 oz)   SpO2 94%   BMI 19.76 kg/m²       Alert, in NAD. Heart is regular. Oriented x3. Speech is fluent. Speech clear. Registration 3 out of 3 objects. Recall at 1 min: 1 out of 3 objects. Daughter helps with history. EOMs are full, PERRL, VFFTC, no nystagmus. No facial asymmetry. Tongue is midline. Strength and tone are normal. No drift of the bilateral upper extremities. Fine finger movements symmetrical. FNF intact bilaterally. LT and PP intact throughout. DTRs +2. Gait is symmetric and steady. Impression/Plan: This is an 20-year-old female who presents in hospital follow-up for syncopal episode. She has had a syncopal episode in February and in March. Of note, she has been treated with azithromycin and mycobutin for a fungal infection. Further, she was hypotensive in the past and her BP meds were being adjusted. It was noted she had some postural dizziness; assessment was reassuring neurological exam, underlying distal symmetric peripheral polyneuropathy, and orthostatism, so plan was for orthostatism care, compression stockings, hydration, exercise, therapy evaluations. Recommended PT and follow up with cardiology.   Discussed DMV syncope policy and to avoid driving for now, 6 months from syncopal spell. Compression stockings recommended. Follow up with vascular for carotid stenosis. She is on antiplatelet. Monitor cognitive concerns which have been going on recently. Discussed we can consider neuropsychological evaluation. Follow up as needed. Diagnoses and all orders for this visit:    1. Syncope and collapse      Total time 35 minutes with 20 minutes spent in counseling. Signed By: Swetha Ng NP        PLEASE NOTE:   Portions of this document may have been produced using voice recognition software. Unrecognized errors in transcription may be present.

## 2022-04-06 ENCOUNTER — PATIENT MESSAGE (OUTPATIENT)
Dept: INTERNAL MEDICINE CLINIC | Age: 82
End: 2022-04-06

## 2022-04-06 ENCOUNTER — OFFICE VISIT (OUTPATIENT)
Dept: CARDIOLOGY CLINIC | Age: 82
End: 2022-04-06
Payer: MEDICARE

## 2022-04-06 VITALS
HEIGHT: 60 IN | SYSTOLIC BLOOD PRESSURE: 142 MMHG | WEIGHT: 102 LBS | OXYGEN SATURATION: 97 % | BODY MASS INDEX: 20.03 KG/M2 | DIASTOLIC BLOOD PRESSURE: 78 MMHG

## 2022-04-06 DIAGNOSIS — I77.810 AORTIC ROOT DILATATION (HCC): ICD-10-CM

## 2022-04-06 DIAGNOSIS — E78.5 HYPERLIPIDEMIA, UNSPECIFIED HYPERLIPIDEMIA TYPE: ICD-10-CM

## 2022-04-06 DIAGNOSIS — I25.84 CORONARY ARTERY CALCIFICATION: ICD-10-CM

## 2022-04-06 DIAGNOSIS — I65.22 STENOSIS OF LEFT CAROTID ARTERY: ICD-10-CM

## 2022-04-06 DIAGNOSIS — R55 SYNCOPE AND COLLAPSE: ICD-10-CM

## 2022-04-06 DIAGNOSIS — I35.1 NONRHEUMATIC AORTIC VALVE INSUFFICIENCY: Primary | ICD-10-CM

## 2022-04-06 DIAGNOSIS — I25.10 CORONARY ARTERY CALCIFICATION: ICD-10-CM

## 2022-04-06 DIAGNOSIS — I10 ESSENTIAL HYPERTENSION: ICD-10-CM

## 2022-04-06 PROCEDURE — G8754 DIAS BP LESS 90: HCPCS | Performed by: NURSE PRACTITIONER

## 2022-04-06 PROCEDURE — G8752 SYS BP LESS 140: HCPCS | Performed by: NURSE PRACTITIONER

## 2022-04-06 PROCEDURE — 99214 OFFICE O/P EST MOD 30 MIN: CPT | Performed by: NURSE PRACTITIONER

## 2022-04-06 PROCEDURE — G8427 DOCREV CUR MEDS BY ELIG CLIN: HCPCS | Performed by: NURSE PRACTITIONER

## 2022-04-06 PROCEDURE — G8399 PT W/DXA RESULTS DOCUMENT: HCPCS | Performed by: NURSE PRACTITIONER

## 2022-04-06 PROCEDURE — G8432 DEP SCR NOT DOC, RNG: HCPCS | Performed by: NURSE PRACTITIONER

## 2022-04-06 PROCEDURE — G8536 NO DOC ELDER MAL SCRN: HCPCS | Performed by: NURSE PRACTITIONER

## 2022-04-06 PROCEDURE — G8420 CALC BMI NORM PARAMETERS: HCPCS | Performed by: NURSE PRACTITIONER

## 2022-04-06 PROCEDURE — 1090F PRES/ABSN URINE INCON ASSESS: CPT | Performed by: NURSE PRACTITIONER

## 2022-04-06 PROCEDURE — 1111F DSCHRG MED/CURRENT MED MERGE: CPT | Performed by: NURSE PRACTITIONER

## 2022-04-06 PROCEDURE — 1101F PT FALLS ASSESS-DOCD LE1/YR: CPT | Performed by: NURSE PRACTITIONER

## 2022-04-06 RX ORDER — PYRIDOXINE HCL (VITAMIN B6) 25 MG
TABLET ORAL
COMMUNITY

## 2022-04-06 NOTE — PROGRESS NOTES
HISTORY OF PRESENT ILLNESS  Rut Daley is a [de-identified] y.o. female. 5/27/2021  Patient seen today for new patient evaluation. She is referred here for evaluation of shortness of breath and dyspnea. For about 1 year she has been having shortness of breath that is progressively getting worse. Recently had bronchoscopy that showed abnormality with fungal cells. She is on treatment under Dr. Ruby Garcia. She denies any chest pain. Denies orthopnea PND or peripheral edema. She has no prior cardiac history she has history of hypertension and hyperlipidemia on treatment. She has occasional dizziness likely related to antihypertensive medications  2/2022  Patient seen today for follow-up. Patient has shortness of breath on exertion. Symptoms are stable  4/2022  Patient seen following admission to [facility] for atypical pneumonia sputum positive for AFB, and syncopal episode with question of seizure. Since discharge she has also been treated for UTI. Patient was discharged with 14-day event monitor. She denies recurrent syncope, chest pain, shortness of breath, palpitations or edema. Follow-up  Pertinent negatives include no chest pain, no abdominal pain, no headaches and no shortness of breath. Review of Systems   Constitutional: Negative for chills and fever. HENT: Negative for nosebleeds. Eyes: Negative for blurred vision and double vision. Respiratory: Negative for cough, hemoptysis, sputum production, shortness of breath and wheezing. Cardiovascular: Negative for chest pain, palpitations, orthopnea, claudication, leg swelling and PND. Gastrointestinal: Negative for abdominal pain, heartburn, nausea and vomiting. Musculoskeletal: Negative for myalgias. Skin: Negative for rash. Neurological: Negative for dizziness, weakness and headaches. Endo/Heme/Allergies: Does not bruise/bleed easily.      Family History   Problem Relation Age of Onset   Lizet Larger Glaucoma Mother    Lizet Larger Other Father cerebral aneurysm    Hypertension Sister     Diabetes Sister     Hypertension Sister     Diabetes Paternal Uncle        Past Medical History:   Diagnosis Date    Diverticulosis of sigmoid colon 11/13/15    mild; Dr. Purvi Lira Eczema     Elevated cholesterol     Family history of diabetes mellitus (DM)     Glaucoma     Hyperlipidemia     Hypertension     Indigestion     Interpolated PVCs        Past Surgical History:   Procedure Laterality Date    HX BREAST BIOPSY      HX HYSTERECTOMY  1981    STEPHAN/BSO    HX OOPHORECTOMY      right       Social History     Tobacco Use    Smoking status: Never Smoker    Smokeless tobacco: Never Used   Substance Use Topics    Alcohol use: No     Comment: with dinner a few days per week       Allergies   Allergen Reactions    Pravastatin Other (comments)     Arthralgias/myalgias       Prior to Admission medications    Medication Sig Start Date End Date Taking? Authorizing Provider   pyridoxine, vitamin B6, (VITAMIN B-6) 25 mg tablet pyridoxine (vitamin B6) 25 mg tablet   Take 1 tablet by oral route. Yes Provider, Historical   lisinopriL (PRINIVIL, ZESTRIL) 5 mg tablet Take 1 Tablet by mouth daily. 3/18/22  Yes Tanya Little, DO   timolol (TIMOPTIC) 0.5 % ophthalmic solution Administer 1 Drop to left eye two (2) times a day. 1/19/22  Yes Provider, Historical   rifabutin (MYCOBUTIN) 150 mg capsule rifabutin 150 mg capsule   Take 1 capsule every day by oral route. Yes Other, MD Jose Manuel   azithromycin (ZITHROMAX) 250 mg tablet Take 250 mg by mouth daily. Yes Provider, Historical   aspirin delayed-release 81 mg tablet Take 1 Tablet by mouth daily. 1/28/22  Yes Andrei Carlos G, NP   psyllium husk (DAILY FIBER PO) Take  by mouth. Yes Provider, Historical   omeprazole (PRILOSEC) 20 mg capsule Take 20 mg by mouth daily. Yes Provider, Historical   cyanocobalamin (Vitamin B-12) 1,000 mcg tablet Take 1,000 mcg by mouth daily.    Yes Provider, Historical   folic acid/multivit-min/lutein (CENTRUM SILVER PO) Take  by mouth. Yes Provider, Historical   bimatoprost (LUMIGAN) 0.03 % ophthalmic drops Administer 1 Drop to both eyes every evening. Yes Provider, Historical   CALCIUM CARBONATE/VITAMIN D3 (CALCIUM WITH VITAMIN D PO) Take  by mouth. Yes Provider, Historical   ethambutoL (MYAMBUTOL) 400 mg tablet Take 1 Tablet by mouth daily. Patient not taking: Reported on 4/6/2022 3/17/22 4/6/22  Christina Curtis DO   ethambutoL (MYAMBUTOL) 100 mg tablet Take 2 Tablets by mouth daily. Patient not taking: Reported on 4/6/2022 3/17/22 4/6/22  Christina Curtis DO         Visit Vitals  BP (!) 142/78 (BP 1 Location: Left upper arm, BP Patient Position: Sitting, BP Cuff Size: Adult)   Ht 5' (1.524 m)   Wt 46.3 kg (102 lb)   SpO2 97%   BMI 19.92 kg/m²         Physical Exam  Vitals and nursing note reviewed. Constitutional:       Appearance: She is well-developed. HENT:      Head: Normocephalic and atraumatic. Eyes:      Conjunctiva/sclera: Conjunctivae normal.   Neck:      Thyroid: No thyromegaly. Vascular: No JVD. Trachea: No tracheal deviation. Cardiovascular:      Rate and Rhythm: Normal rate and regular rhythm. Chest Wall: PMI is not displaced. Pulses: No decreased pulses. Heart sounds: No murmur heard. No gallop. No S3 sounds. Pulmonary:      Effort: No respiratory distress. Breath sounds: No wheezing or rales. Chest:      Chest wall: No tenderness. Abdominal:      Palpations: Abdomen is soft. Tenderness: There is no abdominal tenderness. Musculoskeletal:      Cervical back: Neck supple. Right lower leg: No edema. Left lower leg: No edema. Skin:     General: Skin is warm. Neurological:      Mental Status: She is alert and oriented to person, place, and time. Ms. Katerina Nash has a reminder for a \"due or due soon\" health maintenance.  I have asked that she contact her primary care provider for follow-up on this health maintenance. No flowsheet data found. I have personally reviewed patient's records available from hospital and other providers and incorporated findings in patient care. Provider Notes, lab results, EKG, CT scan chest and CT any head and neck  CTA NECK VASCULAR ANALYSIS: 4/2021     Aortic arch: Mildly ectatic ascending aorta measuring 4 cm. Bronchoscpy-5/2021  Findings: The nasopharynx/oropharynx appears normal. The larynx appears normal except fro mild erythema dn edema. the arytenoids appeared erythematous and inflammed? reflux. The vocal cords appear normal. The subglottic space is normal. The trachea is of normal caliber. The royer is sharp. The tracheobronchial tree was examined to at least the first subsegmental level. Bronchial mucosa and anatomy are normal; there are no endobronchial lesions, and minimal clear secretions. Bronchial washings taken from the RUL posterior segment and CHELSI with return of blood tinged pink fluid    Post-Operative Diagnosis:  - Infiltrate in the RUL with \"tree in bud pattern\"  - Possible reflux related erythema of the laryngeal structures  - The airway examination was normal  - Right Upper lobe and Left upper lobe bronchial washings collected       Recent Data from [address]  Component 05/19/21 05/19/21    KOH PREP No Yeast or hyphal elements seen. Budding Yeast cells seen. Abnormal      MODIFIED AFB SMEAR- for Nocardia  Specimen:  Various culture specimens - Bronchial structure (body structure)  Component 8 d ago   MODIFIED AFB SMEAR  No partially Acid Fast Bacillus seen      Interpretation Summary 6/2021 echo    · LV: Estimated LVEF is 55 - 60%. Normal cavity size, wall thickness and systolic function (ejection fraction normal). Wall motion: normal. Mild (grade 1) left ventricular diastolic dysfunction. · AV: Mild aortic valve regurgitation is present. · TV: Right Ventricular Arterial Pressure (RVSP) is 25 mmHg.   · AO: Mild aortic root and ascending aorta dilatation. Aortic root diameter = 3.9 cm. Ascending aorta diameter = 4 cm. Procedure Conclusion 62,021    Nuclear Stress Test    Nuclear Cardiac Spect Rest then Gated Stress with tomographic imaging/tomography utilized for the tomographic myocardical perfusion imaging performed. Exercise was used as the stressing method and agent. One day myocardial perfusion study. Date: 6/4/2021. Left ventricular perfusion is normal. Myocardial perfusion imaging supports a low risk stress test.   There is no prior study available for comparison. . This Single Photon Emission Computer Tomograph (SPECT) study utilized tomographic imaging/tomography for the tomographic myocardial perfusion imaging performed during this study. Echo 3/15/2022       Left Ventricle: Left ventricle size is normal. Mildly increased wall thickness. Normal wall motion. Normal left ventricular systolic function with a visually estimated EF of 55 - 60%.   Aortic Valve: Mild transvalvular regurgitation      3/15/22 Neck CTA: lateral proximal cervical carotid atherosclerosis. On the left, this is potentially hemodynamically significant with 60-70% stenosis. Reticulonodular findings within the lungs most characteristic of postinfectious/postinflammatory sequelae. Some accompanying chronic morphology bronchitis. Assessment     Event monitor 4/2022  Conclusions:     Rhythm with sinus rhythm with first-degree AV block. No episodes of high-grade AV block identified. Occasional PVCs, majority isolated. Symptoms in the diary correspond to sinus rhythm and also occasional isolated PVCs. ICD-10-CM ICD-9-CM    1. Nonrheumatic aortic valve insufficiency  I35.1 424.1     Asymptomatic stable monitor   2. Aortic root dilatation (HCC)  I77.810 447.71     Asymptomatic stable monitor   3. Essential hypertension  I10 401.9     B/P controlled on current medications   4.  Hyperlipidemia, unspecified hyperlipidemia type E78.5 272.4     Continue statin   5. Coronary artery calcification  I25.10 414.00     I25.84 414.4     Continue medical management   6. Stenosis of left carotid artery  I65.22 433.10     Left 70% stenosis - continue aspirin, plavix - currently held due to fall risk - intolerant to statins. 7. Syncope and collapse  R55 780.2     Event monitor SR with 1st degree AVB, no arrhythmia or high degree AV  Elvera Bras is currently being held   6/2021  Seen for follow-up. Shortness of breath on exertion is unchanged. Mild aortic regurgitation and aortic root dilatation. Blood pressure is elevated I will increase amlodipine to 10 mg a day. Continue other treatment  2/2022  Cardiac status stable continue current medical management. Coronary calcification noted on my review of CT scan. Would add low-dose rosuvastatin to see if she can tolerate. We will also continue with aspirin 81 mg a day. Blood pressure elevated here but home readings are normal she will take the blood pressure machine with her  To her PCP  4/2022  Seen following admission for syncope. Event monitor reviewed and discussed with patient and son, SR with first degree AV, no high-grade AV block noted. No further syncopal episodes since discharge. Patient is scheduled to follow-up with vascular surgeon due to carotid artery stenosis. She reports was intolerable to rosuvastatin and was unable to take due to myalgias. Diuretics were discontinued during hospitalization. Fluid status appears compensated. Per notes Plavix is also on hold due to increased fall risk she is continue to take aspirin. Blood pressure is controlled with current medications. Medications Discontinued During This Encounter   Medication Reason    ethambutoL (MYAMBUTOL) 100 mg tablet LIST CLEANUP    ethambutoL (MYAMBUTOL) 400 mg tablet LIST CLEANUP       No orders of the defined types were placed in this encounter.       Follow-up and Dispositions    · Return in about 4 months (around 8/6/2022) for Follow up with Dr. Matty Posadas as previously scheduled. Kenyatta Molina

## 2022-04-06 NOTE — PATIENT INSTRUCTIONS
Heart-Healthy Diet: Care Instructions  Your Care Instructions     A heart-healthy diet has lots of vegetables, fruits, nuts, beans, and whole grains, and is low in salt. It limits foods that are high in saturated fat, such as meats, cheeses, and fried foods. It may be hard to change your diet, but even small changes can lower your risk of heart attack and heart disease. Follow-up care is a key part of your treatment and safety. Be sure to make and go to all appointments, and call your doctor if you are having problems. It's also a good idea to know your test results and keep a list of the medicines you take. How can you care for yourself at home? Watch your portions  · Use food labels to learn what the recommended servings are for the foods you eat. · Eat only the number of calories you need to stay at a healthy weight. If you need to lose weight, eat fewer calories than your body burns (through exercise and other physical activity). Eat more fruits and vegetables  · Eat a variety of fruit and vegetables every day. Dark green, deep orange, red, or yellow fruits and vegetables are especially good for you. Examples include spinach, carrots, peaches, and berries. · Keep carrots, celery, and other veggies handy for snacks. Buy fruit that is in season and store it where you can see it so that you will be tempted to eat it. · Cook dishes that have a lot of veggies in them, such as stir-fries and soups. Limit saturated fat  · Read food labels, and try to avoid saturated fats. They increase your risk of heart disease. · Use olive or canola oil when you cook. · Bake, broil, grill, or steam foods instead of frying them. · Choose lean meats instead of high-fat meats such as hot dogs and sausages. Cut off all visible fat when you prepare meat. · Eat fish, skinless poultry, and meat alternatives such as soy products instead of high-fat meats.  Soy products, such as tofu, may be especially good for your heart.  · Choose low-fat or fat-free milk and dairy products. Eat foods high in fiber  · Eat a variety of grain products every day. Include whole-grain foods that have lots of fiber and nutrients. Examples of whole-grain foods include oats, whole wheat bread, and brown rice. · Buy whole-grain breads and cereals, instead of white bread or pastries. Limit salt and sodium  · Limit how much salt and sodium you eat to help lower your blood pressure. · Taste food before you salt it. Add only a little salt when you think you need it. With time, your taste buds will adjust to less salt. · Eat fewer snack items, fast foods, and other high-salt, processed foods. Check food labels for the amount of sodium in packaged foods. · Choose low-sodium versions of canned goods (such as soups, vegetables, and beans). Limit sugar  · Limit drinks and foods with added sugar. These include candy, desserts, and soda pop. Limit alcohol  · Limit alcohol to no more than 2 drinks a day for men and 1 drink a day for women. Too much alcohol can cause health problems. When should you call for help? Watch closely for changes in your health, and be sure to contact your doctor if:    · You would like help planning heart-healthy meals. Where can you learn more? Go to http://www.akins.com/  Enter V137 in the search box to learn more about \"Heart-Healthy Diet: Care Instructions. \"  Current as of: September 8, 2021               Content Version: 13.2  © 2006-2022 [employer]. Care instructions adapted under license by [employer] (which disclaims liability or warranty for this information). If you have questions about a medical condition or this instruction, always ask your healthcare professional. [place] [address] any warranty or liability for your use of this information.

## 2022-04-06 NOTE — PROGRESS NOTES
1. Have you been to the ER, urgent care clinic since your last visit? Hospitalized since your last visit? Yes When: March Where: [facility] Reason for visit: syncope    2. Have you seen or consulted any other health care providers outside of the [address] [place] [address] since your last visit? Include any pap smears or colon screening.       Yes Where: [facility] doctor Reason for visit: routine

## 2022-04-08 ENCOUNTER — HOSPITAL ENCOUNTER (OUTPATIENT)
Dept: LAB | Age: 82
Discharge: HOME | End: 2022-04-08
Payer: MEDICARE

## 2022-04-08 ENCOUNTER — LAB ONLY (OUTPATIENT)
Dept: INTERNAL MEDICINE CLINIC | Age: 82
End: 2022-04-08

## 2022-04-08 ENCOUNTER — TELEPHONE (OUTPATIENT)
Dept: INTERNAL MEDICINE CLINIC | Age: 82
End: 2022-04-08

## 2022-04-08 DIAGNOSIS — I10 ESSENTIAL HYPERTENSION: ICD-10-CM

## 2022-04-08 DIAGNOSIS — R93.89 ABNORMAL CHEST CT: Primary | ICD-10-CM

## 2022-04-08 DIAGNOSIS — E78.5 HYPERLIPIDEMIA LDL GOAL <130: ICD-10-CM

## 2022-04-08 DIAGNOSIS — R73.9 HYPERGLYCEMIA: ICD-10-CM

## 2022-04-08 DIAGNOSIS — R93.89 ABNORMAL CHEST CT: ICD-10-CM

## 2022-04-08 LAB
ALBUMIN SERPL-MCNC: 3.5 G/DL (ref 3.4–5)
ALBUMIN/GLOB SERPL: 1.2 {RATIO} (ref 0.8–1.7)
ALP SERPL-CCNC: 111 U/L (ref 45–117)
ALT SERPL-CCNC: 70 U/L (ref 13–56)
ANION GAP SERPL CALC-SCNC: 6 MMOL/L (ref 3–18)
AST SERPL-CCNC: 30 U/L (ref 10–38)
BASOPHILS # BLD: 0 K/UL (ref 0–0.1)
BASOPHILS NFR BLD: 1 % (ref 0–2)
BILIRUB SERPL-MCNC: 0.3 MG/DL (ref 0.2–1)
BUN SERPL-MCNC: 10 MG/DL (ref 7–18)
BUN/CREAT SERPL: 15 (ref 12–20)
CALCIUM SERPL-MCNC: 8.8 MG/DL (ref 8.5–10.1)
CHLORIDE SERPL-SCNC: 102 MMOL/L (ref 100–111)
CHOLEST SERPL-MCNC: 189 MG/DL
CO2 SERPL-SCNC: 30 MMOL/L (ref 21–32)
CREAT SERPL-MCNC: 0.67 MG/DL (ref 0.6–1.3)
CREAT UR-MCNC: 112 MG/DL (ref 30–125)
CRP SERPL-MCNC: <0.3 MG/DL (ref 0–0.3)
DIFFERENTIAL METHOD BLD: ABNORMAL
EOSINOPHIL # BLD: 0.1 K/UL (ref 0–0.4)
EOSINOPHIL NFR BLD: 3 % (ref 0–5)
ERYTHROCYTE [DISTWIDTH] IN BLOOD BY AUTOMATED COUNT: 14.3 % (ref 11.6–14.5)
EST. AVERAGE GLUCOSE BLD GHB EST-MCNC: 146 MG/DL
GLOBULIN SER CALC-MCNC: 2.9 G/DL (ref 2–4)
GLUCOSE SERPL-MCNC: 104 MG/DL (ref 74–99)
HBA1C MFR BLD: 6.7 % (ref 4.2–5.6)
HCT VFR BLD AUTO: 34.7 % (ref 35–45)
HDLC SERPL-MCNC: 68 MG/DL (ref 40–60)
HDLC SERPL: 2.8 {RATIO} (ref 0–5)
HGB BLD-MCNC: 11.6 G/DL (ref 12–16)
IMM GRANULOCYTES # BLD AUTO: 0 K/UL (ref 0–0.04)
IMM GRANULOCYTES NFR BLD AUTO: 0 % (ref 0–0.5)
LDLC SERPL CALC-MCNC: 96.6 MG/DL (ref 0–100)
LIPID PROFILE,FLP: ABNORMAL
LYMPHOCYTES # BLD: 1.1 K/UL (ref 0.9–3.6)
LYMPHOCYTES NFR BLD: 39 % (ref 21–52)
MCH RBC QN AUTO: 30.9 PG (ref 24–34)
MCHC RBC AUTO-ENTMCNC: 33.4 G/DL (ref 31–37)
MCV RBC AUTO: 92.3 FL (ref 78–100)
MICROALBUMIN UR-MCNC: 9.75 MG/DL (ref 0–3)
MICROALBUMIN/CREAT UR-RTO: 87 MG/G (ref 0–30)
MONOCYTES # BLD: 0.4 K/UL (ref 0.05–1.2)
MONOCYTES NFR BLD: 14 % (ref 3–10)
NEUTS SEG # BLD: 1.2 K/UL (ref 1.8–8)
NEUTS SEG NFR BLD: 43 % (ref 40–73)
NRBC # BLD: 0 K/UL (ref 0–0.01)
NRBC BLD-RTO: 0 PER 100 WBC
PLATELET # BLD AUTO: 145 K/UL (ref 135–420)
PMV BLD AUTO: 12.2 FL (ref 9.2–11.8)
POTASSIUM SERPL-SCNC: 3.9 MMOL/L (ref 3.5–5.5)
PROT SERPL-MCNC: 6.4 G/DL (ref 6.4–8.2)
RBC # BLD AUTO: 3.76 M/UL (ref 4.2–5.3)
SODIUM SERPL-SCNC: 138 MMOL/L (ref 136–145)
TRIGL SERPL-MCNC: 122 MG/DL (ref ?–150)
VLDLC SERPL CALC-MCNC: 24.4 MG/DL
WBC # BLD AUTO: 2.8 K/UL (ref 4.6–13.2)

## 2022-04-08 PROCEDURE — 82043 UR ALBUMIN QUANTITATIVE: CPT

## 2022-04-08 PROCEDURE — 86225 DNA ANTIBODY NATIVE: CPT

## 2022-04-08 PROCEDURE — 80061 LIPID PANEL: CPT

## 2022-04-08 PROCEDURE — 86140 C-REACTIVE PROTEIN: CPT

## 2022-04-08 PROCEDURE — 83036 HEMOGLOBIN GLYCOSYLATED A1C: CPT

## 2022-04-08 PROCEDURE — 36415 COLL VENOUS BLD VENIPUNCTURE: CPT

## 2022-04-08 PROCEDURE — 80053 COMPREHEN METABOLIC PANEL: CPT

## 2022-04-08 PROCEDURE — 85025 COMPLETE CBC W/AUTO DIFF WBC: CPT

## 2022-04-08 RX ORDER — LISINOPRIL 5 MG/1
5 TABLET ORAL DAILY
Qty: 90 TABLET | Refills: 3 | Status: SHIPPED | OUTPATIENT
Start: 2022-04-08

## 2022-04-08 NOTE — TELEPHONE ENCOUNTER
----- Message from Kay Jamil sent at 4/8/2022  8:24 AM EDT -----  Subject: Message to Provider    QUESTIONS  Information for Provider? Pt need to know whether she can eat before blood   work appt at 10:25AM or if necessary pt can come earlier for the blood   work.  ---------------------------------------------------------------------------  --------------  [address]  What is the best way for the office to contact you? OK to leave message on   voicemail  Preferred Call Back Phone Number?  [phone]  ---------------------------------------------------------------------------  --------------  SCRIPT ANSWERS  undefined

## 2022-04-09 LAB
CENTROMERE B AB SER-ACNC: <0.2 AI (ref 0–0.9)
CHROMATIN AB SERPL-ACNC: <0.2 AI (ref 0–0.9)
DSDNA AB SER-ACNC: 5 IU/ML (ref 0–9)
ENA JO1 AB SER-ACNC: <0.2 AI (ref 0–0.9)
ENA RNP AB SER-ACNC: <0.2 AI (ref 0–0.9)
ENA SCL70 AB SER-ACNC: <0.2 AI (ref 0–0.9)
ENA SM AB SER-ACNC: <0.2 AI (ref 0–0.9)
ENA SS-A AB SER-ACNC: <0.2 AI (ref 0–0.9)
ENA SS-B AB SER-ACNC: <0.2 AI (ref 0–0.9)
SEE BELOW, 164869: NORMAL

## 2022-04-10 NOTE — PROGRESS NOTES
I will discuss results with her at her upcoming appointment. Her CRP is unremarkable. Her MARISSA is unremarkable. Her microalbuminuria has improved since 5 months ago. Her CBC shows a low white blood cell count of 2.8. It was 3.4 in March. Her hemoglobin is 11.6. Her platelet count is normal.  Her total cholesterol is 189. Triglycerides are 122. HDL is 68. LDL is 96, down from 149 in November. Her CMP is unremarkable except for an ALT of 70. Please for these results to her pulmonology team who is prescribing rifabutin.   Also for these to her GI team.    Dr. Tereza Yee  [facility], [address] [place] [address], [address] [place] [address] Str.  Phone: [phone]  Fax: [phone]

## 2022-04-11 ENCOUNTER — TELEPHONE (OUTPATIENT)
Dept: INTERNAL MEDICINE CLINIC | Age: 82
End: 2022-04-11

## 2022-04-11 NOTE — TELEPHONE ENCOUNTER
----- Message from Kathryn Cano MD sent at 4/11/2022  4:32 PM EDT -----  Regarding: In office visit to be changed to VV  Please change her visit to a virtual visit. Please let the patient know that her visit on Friday has been converted to a virtual visit. She is in [place] at this time and a virtual visit more practical for her.     Thanks,  Dr. Nora Nguyen  [facility], [address] [place] [address], [address] [place] [address].  Phone: [phone]  Fax: [phone]

## 2022-04-11 NOTE — TELEPHONE ENCOUNTER
[facility]    appt not changed to virtual yet.  Please see not below from Dr Elenita Vogel

## 2022-04-15 ENCOUNTER — VIRTUAL VISIT (OUTPATIENT)
Dept: INTERNAL MEDICINE CLINIC | Age: 82
End: 2022-04-15
Payer: MEDICARE

## 2022-04-15 DIAGNOSIS — D72.819 LEUKOPENIA, UNSPECIFIED TYPE: ICD-10-CM

## 2022-04-15 DIAGNOSIS — A31.0 MAI (MYCOBACTERIUM AVIUM-INTRACELLULARE) (HCC): ICD-10-CM

## 2022-04-15 DIAGNOSIS — E11.9 DIABETES MELLITUS TYPE 2, DIET-CONTROLLED (HCC): Primary | ICD-10-CM

## 2022-04-15 DIAGNOSIS — F41.9 ANXIETY AND DEPRESSION: ICD-10-CM

## 2022-04-15 DIAGNOSIS — K86.89 PANCREATIC MASS: ICD-10-CM

## 2022-04-15 DIAGNOSIS — R97.8 OTHER ABNORMAL TUMOR MARKERS: ICD-10-CM

## 2022-04-15 DIAGNOSIS — F32.A ANXIETY AND DEPRESSION: ICD-10-CM

## 2022-04-15 PROCEDURE — G8399 PT W/DXA RESULTS DOCUMENT: HCPCS | Performed by: INTERNAL MEDICINE

## 2022-04-15 PROCEDURE — G8756 NO BP MEASURE DOC: HCPCS | Performed by: INTERNAL MEDICINE

## 2022-04-15 PROCEDURE — 1090F PRES/ABSN URINE INCON ASSESS: CPT | Performed by: INTERNAL MEDICINE

## 2022-04-15 PROCEDURE — 99214 OFFICE O/P EST MOD 30 MIN: CPT | Performed by: INTERNAL MEDICINE

## 2022-04-15 PROCEDURE — 1111F DSCHRG MED/CURRENT MED MERGE: CPT | Performed by: INTERNAL MEDICINE

## 2022-04-15 PROCEDURE — G0463 HOSPITAL OUTPT CLINIC VISIT: HCPCS | Performed by: INTERNAL MEDICINE

## 2022-04-15 PROCEDURE — G8427 DOCREV CUR MEDS BY ELIG CLIN: HCPCS | Performed by: INTERNAL MEDICINE

## 2022-04-15 PROCEDURE — 3044F HG A1C LEVEL LT 7.0%: CPT | Performed by: INTERNAL MEDICINE

## 2022-04-15 PROCEDURE — 1101F PT FALLS ASSESS-DOCD LE1/YR: CPT | Performed by: INTERNAL MEDICINE

## 2022-04-15 PROCEDURE — G8432 DEP SCR NOT DOC, RNG: HCPCS | Performed by: INTERNAL MEDICINE

## 2022-04-15 NOTE — PROGRESS NOTES
Rut Daley is a [de-identified] y.o. female who was seen by synchronous (real-time) audio-video technology on 4/15/2022. Assessment & Plan:   1. Diabetes mellitus type 2, diet-controlled: New diagnosis. +Microalbuminuria. - I discussed this new diagnosis. All questions were answered. - I offered to have her meet with a nutritionist as she needs to lower her carbs but does not need to lose weight in the process - in order to control her diabetes. - Ordering a f/u A1C in 4 months. 2. Leukopenia: Per recent labs. From rifabutin? +H/o pancreatic mass. - Placing a referral to Hematology  - I discussed having her see GI for a check up and for recommendations regarding her pancreatic mass seen on imaging studies. Per her son, the pt does not want to pursue seeing a GI doctor for this issue at this time. - I encouraged the pt to increase her fiber to 20-30g per day total for constipation sx. - I discussed getting a follow up CT later this year if she does not meet with any specialists mentioned above  - Checking labs in 4 months. ORDERS:  - REFERRAL TO HEMATOLOGY    3. Atypical PNA:   - I encouraged her to c/w abx rx per her pulmonologist    4. Insomnia and Anxiety:   - Her family will let me know if she wants to try a rx for anxiety sx. Lab review: labs are reviewed in the EHR and ordered as mentioned above     I have discussed the diagnosis with the patient and the intended plan as seen in the above orders. I have discussed medication side effects and warnings with the patient as well. I have reviewed the plan of care with the patient, accepted their input and they are in agreement with the treatment goals. All questions were answered. The patient understands the plan of care. Pt instructed if symptoms worsen to call the office or report to the ED for continued care. Greater than 50% of the visit time was spent in counseling and/or coordination of care.      Voice recognition was used to generate this report, which may have resulted in some phonetic based errors in grammar and contents. Even though attempts were made to correct all the mistakes, some may have been missed, and remained in the body of the document. 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 1. Low WBC: Her most recent labs show that her WBC is low at 2.8. Her hemoglobin is at her baseline, and mildly low at 11.6. Her MCV and RDW are WNL. Her platelet count is WNL at 145.     2. Atpyical PNA: She is taking abx - rifabutin and azithromycin - for mycobacteriam avium infection. Followed by  with Pulmonology. \"I\"m a little weak. \" Her breathing is good some days and \"not so good\" other days. No cough. No fever. +JOHNSON. 3. Pancreatic Mass: Seen earlier this year on imaging. No abdominlal pain. Asymptomatic except for declining weight over the past several months and occasional constipation (no diarrhea though). Weight has been improving though lately. The pt was referred to GI at her last apt but never scheduled an apt. She does not seem interested in seeing GI at this time. Her son also states that she is not interested in seeking aggressive surveillance/intervention for this incidental finding on her imaging studies. Her recent labs show: +MIld Transaminitis. 4. Type 2 DM: Her A1C is 6.7. New diagnosis. +Microalbuminuria per recent labs. BP Readings from Last 3 Encounters:   04/06/22 (!) 142/78   03/30/22 118/64   03/21/22 (!) 152/76     5. Insomnia: +Not sleeping well at night. +Anxiety and depression sx per her son.          Prior to Admission medications Medication Sig Start Date End Date Taking? Authorizing Provider   lisinopriL (PRINIVIL, ZESTRIL) 5 mg tablet Take 1 Tablet by mouth daily. 4/8/22   Andrei Carlos NP   pyridoxine, vitamin B6, (VITAMIN B-6) 25 mg tablet pyridoxine (vitamin B6) 25 mg tablet   Take 1 tablet by oral route. Provider, Historical   timolol (TIMOPTIC) 0.5 % ophthalmic solution Administer 1 Drop to left eye two (2) times a day. 1/19/22   Provider, Historical   rifabutin (MYCOBUTIN) 150 mg capsule rifabutin 150 mg capsule   Take 1 capsule every day by oral route. Other, MD Jose Manuel   azithromycin (ZITHROMAX) 250 mg tablet Take 250 mg by mouth daily. Provider, Historical   aspirin delayed-release 81 mg tablet Take 1 Tablet by mouth daily. 1/28/22   Andrei Carlos NP   psyllium husk (DAILY FIBER PO) Take  by mouth. Provider, Historical   omeprazole (PRILOSEC) 20 mg capsule Take 20 mg by mouth daily. Provider, Historical   cyanocobalamin (Vitamin B-12) 1,000 mcg tablet Take 1,000 mcg by mouth daily. Provider, Historical   folic acid/multivit-min/lutein (CENTRUM SILVER PO) Take  by mouth. Provider, Historical   bimatoprost (LUMIGAN) 0.03 % ophthalmic drops Administer 1 Drop to both eyes every evening. Provider, Historical   CALCIUM CARBONATE/VITAMIN D3 (CALCIUM WITH VITAMIN D PO) Take  by mouth.     Provider, Historical     Allergies   Allergen Reactions    Pravastatin Other (comments)     Arthralgias/myalgias     Past Medical History:   Diagnosis Date    Diverticulosis of sigmoid colon 11/13/15    mild; Dr. Danie Blevins Eczema     Elevated cholesterol     Family history of diabetes mellitus (DM)     Glaucoma     Hyperlipidemia     Hypertension     Indigestion     Interpolated PVCs      Past Surgical History:   Procedure Laterality Date    HX BREAST BIOPSY      HX HYSTERECTOMY  1981    STEPHAN/BSO    HX OOPHORECTOMY      right     Family History   Problem Relation Age of Onset   Roman Merino Glaucoma Mother    Roman Merino Other Father cerebral aneurysm    Hypertension Sister     Diabetes Sister     Hypertension Sister     Diabetes Paternal Uncle      Social History     Socioeconomic History    Marital status:    Tobacco Use    Smoking status: Never Smoker    Smokeless tobacco: Never Used   Substance and Sexual Activity    Alcohol use: No     Comment: with dinner a few days per week    Drug use: No    Sexual activity: Not Currently       ROS:  Gen: No fever/chills  HEENT: No sore throat, eye pain, ear pain, or congestion.  No HA  CV: No CP  Resp: No cough/SOB  GI: No abdominal pain  : No hematuria/dysuria  Derm: No rash  Neuro: No new paresthesias/weakness  Musc: No new myalgias/jt pain  Psych: No depression sx      Objective:     General: alert, cooperative, no distress   Mental  status: mental status: alert, oriented to person, place, and time, normal mood, behavior, speech, dress, motor activity, and thought processes   Resp: resp: normal effort and no respiratory distress   Neuro: neuro: no gross deficits   Skin: skin: no discoloration or lesions of concern on visible areas     LABS:  Lab Results   Component Value Date/Time    Sodium 138 04/08/2022 09:49 AM    Potassium 3.9 04/08/2022 09:49 AM    Chloride 102 04/08/2022 09:49 AM    CO2 30 04/08/2022 09:49 AM    Anion gap 6 04/08/2022 09:49 AM    Glucose 104 (H) 04/08/2022 09:49 AM    BUN 10 04/08/2022 09:49 AM    Creatinine 0.67 04/08/2022 09:49 AM    BUN/Creatinine ratio 15 04/08/2022 09:49 AM    GFR est AA >60 04/08/2022 09:49 AM    GFR est non-AA >60 04/08/2022 09:49 AM    Calcium 8.8 04/08/2022 09:49 AM       Lab Results   Component Value Date/Time    Cholesterol, total 189 04/08/2022 09:49 AM    HDL Cholesterol 68 (H) 04/08/2022 09:49 AM    LDL, calculated 96.6 04/08/2022 09:49 AM    VLDL, calculated 24.4 04/08/2022 09:49 AM    Triglyceride 122 04/08/2022 09:49 AM    CHOL/HDL Ratio 2.8 04/08/2022 09:49 AM       Lab Results   Component Value Date/Time    WBC 2.8 (L) 04/08/2022 09:49 AM    WBC 6.8 05/16/2012 09:03 AM    HGB 11.6 (L) 04/08/2022 09:49 AM    HCT 34.7 (L) 04/08/2022 09:49 AM    PLATELET 791 79/54/2217 09:49 AM    MCV 92.3 04/08/2022 09:49 AM       Lab Results   Component Value Date/Time    Hemoglobin A1c 6.7 (H) 04/08/2022 09:49 AM       Lab Results   Component Value Date/Time    TSH 3.26 03/14/2022 11:20 AM           Due to this being a TeleHealth  evaluation, many elements of the physical examination are unable to be assessed. The pt was seen by synchronous (real-time) audio-video technology, and/or her healthcare decision maker, is aware that this patient-initiated, Telehealth encounter is a billable service, with coverage as determined by her insurance carrier. She is aware that she may receive a bill and has provided verbal consent to proceed: Yes. The patient (or guardian if applicable) is aware that this is a billable service, which includes applicable copays. This virtual visit was conducted with the patient's (and/or legal guardian's) consent. The pt is located in a state where the provider was licensed to provide care. The pt is being evaluated by a video visit encounter for concerns as above. A caregiver was present when appropriate. Due to this being a TeleHealth encounter (During [facility] public health emergency), evaluation of the following organ systems was limited: Vitals/Constitutional/EENT/Resp/CV/GI//MS/Neuro/Skin/Heme-Lymph-Imm. Pursuant to the emergency declaration under the [facility], 1135 waiver authority and the [employer] Act, this Virtual  Visit was conducted, with patient's (and/or legal guardian's) consent, to reduce the patient's risk of exposure to COVID-19 and provide necessary medical care. Services were provided through a video synchronous discussion virtually to substitute for in-person clinic visit.  Patient and provider were located at their individual home/office respectively. We discussed the expected course, resolution and complications of the diagnosis(es) in detail. Medication risks, benefits, costs, interactions, and alternatives were discussed as indicated. I advised her to contact the office if her condition worsens, changes or fails to improve as anticipated. She expressed understanding with the diagnosis(es) and plan.      Bernadine Healy MD

## 2022-04-19 DIAGNOSIS — F32.A DEPRESSION, UNSPECIFIED DEPRESSION TYPE: Primary | ICD-10-CM

## 2022-04-19 RX ORDER — FLUOXETINE 10 MG/1
10 CAPSULE ORAL DAILY
Qty: 30 CAPSULE | Refills: 5 | Status: SHIPPED | OUTPATIENT
Start: 2022-04-19

## 2022-04-24 PROBLEM — E11.9 DIABETES MELLITUS TYPE 2, DIET-CONTROLLED (HCC): Status: ACTIVE | Noted: 2022-04-24

## 2022-04-24 PROBLEM — E87.6 HYPOKALEMIA: Status: RESOLVED | Noted: 2022-03-14 | Resolved: 2022-04-24

## 2022-04-24 PROBLEM — R73.01 IMPAIRED FASTING GLUCOSE: Status: RESOLVED | Noted: 2018-02-21 | Resolved: 2022-04-24

## 2022-04-24 PROBLEM — R89.9 ACID-FAST BACTERIA PRESENT: Status: RESOLVED | Noted: 2021-06-30 | Resolved: 2022-04-24

## 2022-04-24 PROBLEM — R97.8 OTHER ABNORMAL TUMOR MARKERS: Status: ACTIVE | Noted: 2022-04-24

## 2022-04-24 PROBLEM — D72.819 LEUKOPENIA: Status: ACTIVE | Noted: 2022-04-24

## 2022-04-24 PROBLEM — R55 SYNCOPE AND COLLAPSE: Status: RESOLVED | Noted: 2022-03-14 | Resolved: 2022-04-24

## 2022-04-25 ENCOUNTER — TELEPHONE (OUTPATIENT)
Dept: INTERNAL MEDICINE CLINIC | Age: 82
End: 2022-04-25

## 2022-04-25 NOTE — TELEPHONE ENCOUNTER
----- Message from Jade Toney MD sent at 4/24/2022  7:09 PM EDT -----  Regarding: F/u apts needed  Please schedule a follow up apt in 4 months with labs scheduled 1 wk before. Please make sure the apt is for 40 min.     Thanks,  Dr. Harris Stoddard  [facility], [address] [place] [address], [address] [place] [address] Str.  Phone: [phone]  Fax: [phone]

## 2022-04-25 NOTE — LETTER
4/26/2022 8:10 AM        Ms. Larisa Flores  [address] [facility] [address]      Dear Ms. Hollis:    We've been trying to reach you. Please call our office at [phone] and schedule a follow up appointment in 4 months with labs one week prior for your continued care.         Sincerely,      Linus Garcia MD

## 2022-04-26 ENCOUNTER — TELEPHONE (OUTPATIENT)
Dept: INTERNAL MEDICINE CLINIC | Age: 82
End: 2022-04-26

## 2022-04-26 NOTE — TELEPHONE ENCOUNTER
----- Message from Tom Stringer sent at 4/26/2022  9:25 AM EDT -----  Subject: Message to Provider    QUESTIONS  Information for Provider? Patient would like to have orders for her lab   work for her 4 month follow up mailed to Pardeep Duran   [id number]. As she is visiting her daughter and would like to have the lab work   done there.   ---------------------------------------------------------------------------  --------------  [address]  What is the best way for the office to contact you? OK to leave message on   voicemail  Preferred Call Back Phone Number? [phone]  ---------------------------------------------------------------------------  --------------  SCRIPT ANSWERS  Relationship to Patient? Other  Representative Name? Cassie Carlos  Is the Representative on the appropriate HIPAA document in Epic?  Yes

## 2022-05-25 ENCOUNTER — TELEPHONE (OUTPATIENT)
Dept: NEUROLOGY | Age: 82
End: 2022-05-25

## 2022-05-25 DIAGNOSIS — R41.89 COGNITIVE CHANGE: Primary | ICD-10-CM

## 2022-05-25 NOTE — TELEPHONE ENCOUNTER
Patient daughter called to schedule with Dr. Christa Zapata stated her and NP Jeana Camejo spoke on getting her evaluated but at the time they were unsure but now they are seeing a difference in her cognitive decline